# Patient Record
Sex: MALE | Race: BLACK OR AFRICAN AMERICAN | NOT HISPANIC OR LATINO | Employment: OTHER | URBAN - METROPOLITAN AREA
[De-identification: names, ages, dates, MRNs, and addresses within clinical notes are randomized per-mention and may not be internally consistent; named-entity substitution may affect disease eponyms.]

---

## 2024-01-01 ENCOUNTER — HOSPITAL ENCOUNTER (INPATIENT)
Facility: HOSPITAL | Age: 84
LOS: 6 days | End: 2024-11-28
Attending: STUDENT IN AN ORGANIZED HEALTH CARE EDUCATION/TRAINING PROGRAM | Admitting: STUDENT IN AN ORGANIZED HEALTH CARE EDUCATION/TRAINING PROGRAM
Payer: COMMERCIAL

## 2024-01-01 VITALS
SYSTOLIC BLOOD PRESSURE: 90 MMHG | DIASTOLIC BLOOD PRESSURE: 60 MMHG | HEART RATE: 116 BPM | RESPIRATION RATE: 18 BRPM | TEMPERATURE: 97.1 F | OXYGEN SATURATION: 96 %

## 2024-01-01 PROCEDURE — 25010000002 LORAZEPAM PER 2 MG: Performed by: STUDENT IN AN ORGANIZED HEALTH CARE EDUCATION/TRAINING PROGRAM

## 2024-01-01 PROCEDURE — 25010000002 MORPHINE PER 10 MG: Performed by: STUDENT IN AN ORGANIZED HEALTH CARE EDUCATION/TRAINING PROGRAM

## 2024-01-01 PROCEDURE — 25010000002 HYDROMORPHONE 1 MG/ML SOLUTION: Performed by: STUDENT IN AN ORGANIZED HEALTH CARE EDUCATION/TRAINING PROGRAM

## 2024-01-01 PROCEDURE — 25010000002 LORAZEPAM PER 2 MG: Performed by: HOSPITALIST

## 2024-01-01 RX ORDER — MORPHINE SULFATE 10 MG/ML
6 INJECTION INTRAMUSCULAR; INTRAVENOUS; SUBCUTANEOUS
Status: ACTIVE | OUTPATIENT
Start: 2024-01-01 | End: 2024-01-01

## 2024-01-01 RX ORDER — ACETAMINOPHEN 325 MG/1
650 TABLET ORAL EVERY 4 HOURS PRN
Status: DISCONTINUED | OUTPATIENT
Start: 2024-01-01 | End: 2024-01-01 | Stop reason: HOSPADM

## 2024-01-01 RX ORDER — LORAZEPAM 2 MG/ML
0.5 INJECTION INTRAMUSCULAR
Status: ACTIVE | OUTPATIENT
Start: 2024-01-01 | End: 2024-01-01

## 2024-01-01 RX ORDER — LORAZEPAM 2 MG/ML
1 CONCENTRATE ORAL
Status: ACTIVE | OUTPATIENT
Start: 2024-01-01 | End: 2024-01-01

## 2024-01-01 RX ORDER — LORAZEPAM 2 MG/ML
1 INJECTION INTRAMUSCULAR
Status: ACTIVE | OUTPATIENT
Start: 2024-01-01 | End: 2024-01-01

## 2024-01-01 RX ORDER — MORPHINE SULFATE 20 MG/ML
20 SOLUTION ORAL
Status: ACTIVE | OUTPATIENT
Start: 2024-01-01 | End: 2024-01-01

## 2024-01-01 RX ORDER — LORAZEPAM 2 MG/ML
0.5 INJECTION INTRAMUSCULAR
Status: DISCONTINUED | OUTPATIENT
Start: 2024-01-01 | End: 2024-01-01 | Stop reason: HOSPADM

## 2024-01-01 RX ORDER — LORAZEPAM 2 MG/ML
1 INJECTION INTRAMUSCULAR
Status: DISCONTINUED | OUTPATIENT
Start: 2024-01-01 | End: 2024-01-01 | Stop reason: HOSPADM

## 2024-01-01 RX ORDER — LORAZEPAM 2 MG/ML
2 INJECTION INTRAMUSCULAR
Status: DISCONTINUED | OUTPATIENT
Start: 2024-01-01 | End: 2024-01-01 | Stop reason: HOSPADM

## 2024-01-01 RX ORDER — LORAZEPAM 2 MG/ML
2 INJECTION INTRAMUSCULAR
Status: ACTIVE | OUTPATIENT
Start: 2024-01-01 | End: 2024-01-01

## 2024-01-01 RX ORDER — MORPHINE SULFATE 20 MG/ML
5 SOLUTION ORAL
Status: ACTIVE | OUTPATIENT
Start: 2024-01-01 | End: 2024-01-01

## 2024-01-01 RX ORDER — MORPHINE SULFATE 20 MG/ML
10 SOLUTION ORAL
Status: ACTIVE | OUTPATIENT
Start: 2024-01-01 | End: 2024-01-01

## 2024-01-01 RX ORDER — LORAZEPAM 2 MG/ML
2 CONCENTRATE ORAL
Status: ACTIVE | OUTPATIENT
Start: 2024-01-01 | End: 2024-01-01

## 2024-01-01 RX ORDER — LORAZEPAM 2 MG/ML
2 CONCENTRATE ORAL
Status: DISCONTINUED | OUTPATIENT
Start: 2024-01-01 | End: 2024-01-01 | Stop reason: HOSPADM

## 2024-01-01 RX ORDER — LORAZEPAM 2 MG/ML
0.5 CONCENTRATE ORAL
Status: DISCONTINUED | OUTPATIENT
Start: 2024-01-01 | End: 2024-01-01 | Stop reason: HOSPADM

## 2024-01-01 RX ORDER — MORPHINE SULFATE 2 MG/ML
2 INJECTION, SOLUTION INTRAMUSCULAR; INTRAVENOUS
Status: ACTIVE | OUTPATIENT
Start: 2024-01-01 | End: 2024-01-01

## 2024-01-01 RX ORDER — DIPHENOXYLATE HYDROCHLORIDE AND ATROPINE SULFATE 2.5; .025 MG/1; MG/1
1 TABLET ORAL
Status: DISCONTINUED | OUTPATIENT
Start: 2024-01-01 | End: 2024-01-01 | Stop reason: HOSPADM

## 2024-01-01 RX ORDER — HYDROMORPHONE HYDROCHLORIDE 1 MG/ML
0.5 INJECTION, SOLUTION INTRAMUSCULAR; INTRAVENOUS; SUBCUTANEOUS
Status: ACTIVE | OUTPATIENT
Start: 2024-01-01 | End: 2024-01-01

## 2024-01-01 RX ORDER — BISACODYL 5 MG/1
5 TABLET, DELAYED RELEASE ORAL DAILY PRN
Status: DISCONTINUED | OUTPATIENT
Start: 2024-01-01 | End: 2024-01-01 | Stop reason: HOSPADM

## 2024-01-01 RX ORDER — AMOXICILLIN 250 MG
2 CAPSULE ORAL 2 TIMES DAILY PRN
Status: DISCONTINUED | OUTPATIENT
Start: 2024-01-01 | End: 2024-01-01 | Stop reason: HOSPADM

## 2024-01-01 RX ORDER — LORAZEPAM 2 MG/ML
1 CONCENTRATE ORAL
Status: DISCONTINUED | OUTPATIENT
Start: 2024-01-01 | End: 2024-01-01 | Stop reason: HOSPADM

## 2024-01-01 RX ORDER — HYDROMORPHONE HYDROCHLORIDE 2 MG/ML
1.5 INJECTION, SOLUTION INTRAMUSCULAR; INTRAVENOUS; SUBCUTANEOUS
Status: ACTIVE | OUTPATIENT
Start: 2024-01-01 | End: 2024-01-01

## 2024-01-01 RX ORDER — BISACODYL 10 MG
10 SUPPOSITORY, RECTAL RECTAL DAILY PRN
Status: DISCONTINUED | OUTPATIENT
Start: 2024-01-01 | End: 2024-01-01 | Stop reason: HOSPADM

## 2024-01-01 RX ORDER — ONDANSETRON 4 MG/1
4 TABLET, ORALLY DISINTEGRATING ORAL EVERY 6 HOURS PRN
Status: DISCONTINUED | OUTPATIENT
Start: 2024-01-01 | End: 2024-01-01 | Stop reason: HOSPADM

## 2024-01-01 RX ORDER — ONDANSETRON 2 MG/ML
4 INJECTION INTRAMUSCULAR; INTRAVENOUS EVERY 6 HOURS PRN
Status: DISCONTINUED | OUTPATIENT
Start: 2024-01-01 | End: 2024-01-01 | Stop reason: HOSPADM

## 2024-01-01 RX ORDER — LORAZEPAM 2 MG/ML
0.5 CONCENTRATE ORAL
Status: ACTIVE | OUTPATIENT
Start: 2024-01-01 | End: 2024-01-01

## 2024-01-01 RX ORDER — ACETAMINOPHEN 160 MG/5ML
650 SOLUTION ORAL EVERY 4 HOURS PRN
Status: DISCONTINUED | OUTPATIENT
Start: 2024-01-01 | End: 2024-01-01 | Stop reason: HOSPADM

## 2024-01-01 RX ORDER — LORAZEPAM 2 MG/ML
1 INJECTION INTRAMUSCULAR
Status: DISPENSED | OUTPATIENT
Start: 2024-01-01 | End: 2024-01-01

## 2024-01-01 RX ORDER — MORPHINE SULFATE 4 MG/ML
4 INJECTION, SOLUTION INTRAMUSCULAR; INTRAVENOUS
Status: DISPENSED | OUTPATIENT
Start: 2024-01-01 | End: 2024-01-01

## 2024-01-01 RX ORDER — HYDROMORPHONE HYDROCHLORIDE 1 MG/ML
0.25 INJECTION, SOLUTION INTRAMUSCULAR; INTRAVENOUS; SUBCUTANEOUS
Status: DISCONTINUED | OUTPATIENT
Start: 2024-01-01 | End: 2024-01-01 | Stop reason: HOSPADM

## 2024-01-01 RX ORDER — POLYETHYLENE GLYCOL 3350 17 G/17G
17 POWDER, FOR SOLUTION ORAL DAILY PRN
Status: DISCONTINUED | OUTPATIENT
Start: 2024-01-01 | End: 2024-01-01 | Stop reason: HOSPADM

## 2024-01-01 RX ORDER — ACETAMINOPHEN 650 MG/1
650 SUPPOSITORY RECTAL EVERY 4 HOURS PRN
Status: DISCONTINUED | OUTPATIENT
Start: 2024-01-01 | End: 2024-01-01 | Stop reason: HOSPADM

## 2024-01-01 RX ADMIN — HYDROMORPHONE HYDROCHLORIDE 1 MG: 1 INJECTION, SOLUTION INTRAMUSCULAR; INTRAVENOUS; SUBCUTANEOUS at 05:55

## 2024-01-01 RX ADMIN — MORPHINE SULFATE 4 MG: 4 INJECTION, SOLUTION INTRAMUSCULAR; INTRAVENOUS at 20:19

## 2024-01-01 RX ADMIN — HYDROMORPHONE HYDROCHLORIDE 1 MG: 1 INJECTION, SOLUTION INTRAMUSCULAR; INTRAVENOUS; SUBCUTANEOUS at 12:31

## 2024-01-01 RX ADMIN — HYDROMORPHONE HYDROCHLORIDE 1 MG: 1 INJECTION, SOLUTION INTRAMUSCULAR; INTRAVENOUS; SUBCUTANEOUS at 14:18

## 2024-01-01 RX ADMIN — MORPHINE SULFATE 4 MG: 4 INJECTION, SOLUTION INTRAMUSCULAR; INTRAVENOUS at 16:54

## 2024-01-01 RX ADMIN — MORPHINE SULFATE 4 MG: 4 INJECTION, SOLUTION INTRAMUSCULAR; INTRAVENOUS at 13:40

## 2024-01-01 RX ADMIN — HYDROMORPHONE HYDROCHLORIDE 1 MG: 1 INJECTION, SOLUTION INTRAMUSCULAR; INTRAVENOUS; SUBCUTANEOUS at 00:40

## 2024-01-01 RX ADMIN — LORAZEPAM 1 MG: 2 INJECTION INTRAMUSCULAR; INTRAVENOUS at 12:31

## 2024-01-01 RX ADMIN — HYDROMORPHONE HYDROCHLORIDE 1 MG: 1 INJECTION, SOLUTION INTRAMUSCULAR; INTRAVENOUS; SUBCUTANEOUS at 10:15

## 2024-01-01 RX ADMIN — ACETAMINOPHEN 650 MG: 325 TABLET ORAL at 17:44

## 2024-01-01 RX ADMIN — HYDROMORPHONE HYDROCHLORIDE 1 MG: 1 INJECTION, SOLUTION INTRAMUSCULAR; INTRAVENOUS; SUBCUTANEOUS at 07:54

## 2024-01-01 RX ADMIN — MORPHINE SULFATE 4 MG: 4 INJECTION, SOLUTION INTRAMUSCULAR; INTRAVENOUS at 00:40

## 2024-01-01 RX ADMIN — LORAZEPAM 1 MG: 2 INJECTION INTRAMUSCULAR; INTRAVENOUS at 18:24

## 2024-01-01 RX ADMIN — HYDROMORPHONE HYDROCHLORIDE 1 MG: 1 INJECTION, SOLUTION INTRAMUSCULAR; INTRAVENOUS; SUBCUTANEOUS at 17:30

## 2024-01-01 RX ADMIN — HYDROMORPHONE HYDROCHLORIDE 1 MG: 1 INJECTION, SOLUTION INTRAMUSCULAR; INTRAVENOUS; SUBCUTANEOUS at 18:24

## 2024-01-01 RX ADMIN — HYDROMORPHONE HYDROCHLORIDE 1 MG: 1 INJECTION, SOLUTION INTRAMUSCULAR; INTRAVENOUS; SUBCUTANEOUS at 18:52

## 2024-11-07 ENCOUNTER — APPOINTMENT (OUTPATIENT)
Dept: GENERAL RADIOLOGY | Facility: HOSPITAL | Age: 84
End: 2024-11-07
Payer: MEDICARE

## 2024-11-07 ENCOUNTER — APPOINTMENT (OUTPATIENT)
Dept: CT IMAGING | Facility: HOSPITAL | Age: 84
End: 2024-11-07
Payer: MEDICARE

## 2024-11-07 ENCOUNTER — HOSPITAL ENCOUNTER (INPATIENT)
Facility: HOSPITAL | Age: 84
LOS: 15 days | End: 2024-11-22
Attending: STUDENT IN AN ORGANIZED HEALTH CARE EDUCATION/TRAINING PROGRAM | Admitting: HOSPITALIST
Payer: MEDICARE

## 2024-11-07 DIAGNOSIS — N17.9 ACUTE RENAL FAILURE, UNSPECIFIED ACUTE RENAL FAILURE TYPE: ICD-10-CM

## 2024-11-07 DIAGNOSIS — E87.0 HYPERNATREMIA: Primary | ICD-10-CM

## 2024-11-07 DIAGNOSIS — R41.82 ALTERED MENTAL STATUS, UNSPECIFIED ALTERED MENTAL STATUS TYPE: ICD-10-CM

## 2024-11-07 LAB
ALBUMIN SERPL-MCNC: 2.3 G/DL (ref 3.5–5.2)
ALBUMIN/GLOB SERPL: 0.8 G/DL
ALP SERPL-CCNC: 76 U/L (ref 39–117)
ALT SERPL W P-5'-P-CCNC: 11 U/L (ref 1–41)
AMORPH URATE CRY URNS QL MICRO: PRESENT /HPF
ANION GAP SERPL CALCULATED.3IONS-SCNC: 12 MMOL/L (ref 5–15)
ANION GAP SERPL CALCULATED.3IONS-SCNC: 16 MMOL/L (ref 5–15)
AST SERPL-CCNC: 12 U/L (ref 1–40)
BACTERIA UR QL AUTO: ABNORMAL /HPF
BASOPHILS # BLD AUTO: 0 10*3/MM3 (ref 0–0.2)
BASOPHILS # BLD AUTO: 0.01 10*3/MM3 (ref 0–0.2)
BASOPHILS NFR BLD AUTO: 0 % (ref 0–1.5)
BASOPHILS NFR BLD AUTO: 0.1 % (ref 0–1.5)
BILIRUB SERPL-MCNC: 0.3 MG/DL (ref 0–1.2)
BILIRUB UR QL STRIP: NEGATIVE
BUN SERPL-MCNC: 112 MG/DL (ref 8–23)
BUN SERPL-MCNC: 119 MG/DL (ref 8–23)
BUN/CREAT SERPL: 27.2 (ref 7–25)
BUN/CREAT SERPL: 29.7 (ref 7–25)
CALCIUM SPEC-SCNC: 7.6 MG/DL (ref 8.6–10.5)
CALCIUM SPEC-SCNC: 8.2 MG/DL (ref 8.6–10.5)
CHLORIDE SERPL-SCNC: 140 MMOL/L (ref 98–107)
CHLORIDE SERPL-SCNC: 140 MMOL/L (ref 98–107)
CLARITY UR: ABNORMAL
CO2 SERPL-SCNC: 14 MMOL/L (ref 22–29)
CO2 SERPL-SCNC: 16 MMOL/L (ref 22–29)
COLOR UR: ABNORMAL
CREAT SERPL-MCNC: 3.77 MG/DL (ref 0.76–1.27)
CREAT SERPL-MCNC: 4.37 MG/DL (ref 0.76–1.27)
D-LACTATE SERPL-SCNC: 2.3 MMOL/L (ref 0.5–2)
D-LACTATE SERPL-SCNC: 4.2 MMOL/L (ref 0.5–2)
DEPRECATED RDW RBC AUTO: 53.1 FL (ref 37–54)
DEPRECATED RDW RBC AUTO: 53.6 FL (ref 37–54)
EGFRCR SERPLBLD CKD-EPI 2021: 12.8 ML/MIN/1.73
EGFRCR SERPLBLD CKD-EPI 2021: 15.3 ML/MIN/1.73
EOSINOPHIL # BLD AUTO: 0 10*3/MM3 (ref 0–0.4)
EOSINOPHIL # BLD AUTO: 0 10*3/MM3 (ref 0–0.4)
EOSINOPHIL NFR BLD AUTO: 0 % (ref 0.3–6.2)
EOSINOPHIL NFR BLD AUTO: 0 % (ref 0.3–6.2)
ERYTHROCYTE [DISTWIDTH] IN BLOOD BY AUTOMATED COUNT: 15.9 % (ref 12.3–15.4)
ERYTHROCYTE [DISTWIDTH] IN BLOOD BY AUTOMATED COUNT: 16.2 % (ref 12.3–15.4)
GEN 5 2HR TROPONIN T REFLEX: 165 NG/L
GLOBULIN UR ELPH-MCNC: 3 GM/DL
GLUCOSE BLDC GLUCOMTR-MCNC: 137 MG/DL (ref 70–130)
GLUCOSE BLDC GLUCOMTR-MCNC: 223 MG/DL (ref 70–130)
GLUCOSE SERPL-MCNC: 128 MG/DL (ref 65–99)
GLUCOSE SERPL-MCNC: 252 MG/DL (ref 65–99)
GLUCOSE UR STRIP-MCNC: NEGATIVE MG/DL
HCT VFR BLD AUTO: 24 % (ref 37.5–51)
HCT VFR BLD AUTO: 26.2 % (ref 37.5–51)
HGB BLD-MCNC: 7.9 G/DL (ref 13–17.7)
HGB BLD-MCNC: 8.5 G/DL (ref 13–17.7)
HGB UR QL STRIP.AUTO: ABNORMAL
HYALINE CASTS UR QL AUTO: ABNORMAL /LPF
IMM GRANULOCYTES # BLD AUTO: 0.06 10*3/MM3 (ref 0–0.05)
IMM GRANULOCYTES # BLD AUTO: 0.07 10*3/MM3 (ref 0–0.05)
IMM GRANULOCYTES NFR BLD AUTO: 0.7 % (ref 0–0.5)
IMM GRANULOCYTES NFR BLD AUTO: 0.7 % (ref 0–0.5)
KETONES UR QL STRIP: NEGATIVE
LEUKOCYTE ESTERASE UR QL STRIP.AUTO: ABNORMAL
LYMPHOCYTES # BLD AUTO: 1.27 10*3/MM3 (ref 0.7–3.1)
LYMPHOCYTES # BLD AUTO: 1.53 10*3/MM3 (ref 0.7–3.1)
LYMPHOCYTES NFR BLD AUTO: 14.3 % (ref 19.6–45.3)
LYMPHOCYTES NFR BLD AUTO: 15 % (ref 19.6–45.3)
MCH RBC QN AUTO: 30.1 PG (ref 26.6–33)
MCH RBC QN AUTO: 30.2 PG (ref 26.6–33)
MCHC RBC AUTO-ENTMCNC: 32.4 G/DL (ref 31.5–35.7)
MCHC RBC AUTO-ENTMCNC: 32.9 G/DL (ref 31.5–35.7)
MCV RBC AUTO: 91.6 FL (ref 79–97)
MCV RBC AUTO: 92.9 FL (ref 79–97)
MONOCYTES # BLD AUTO: 0.39 10*3/MM3 (ref 0.1–0.9)
MONOCYTES # BLD AUTO: 0.48 10*3/MM3 (ref 0.1–0.9)
MONOCYTES NFR BLD AUTO: 4.4 % (ref 5–12)
MONOCYTES NFR BLD AUTO: 4.7 % (ref 5–12)
NEUTROPHILS NFR BLD AUTO: 7.14 10*3/MM3 (ref 1.7–7)
NEUTROPHILS NFR BLD AUTO: 79.6 % (ref 42.7–76)
NEUTROPHILS NFR BLD AUTO: 8.15 10*3/MM3 (ref 1.7–7)
NEUTROPHILS NFR BLD AUTO: 80.5 % (ref 42.7–76)
NITRITE UR QL STRIP: NEGATIVE
NRBC BLD AUTO-RTO: 0.2 /100 WBC (ref 0–0.2)
PH UR STRIP.AUTO: 8 [PH] (ref 5–8)
PLATELET # BLD AUTO: 77 10*3/MM3 (ref 140–450)
PLATELET # BLD AUTO: 83 10*3/MM3 (ref 140–450)
PMV BLD AUTO: 10.8 FL (ref 6–12)
PMV BLD AUTO: 11.6 FL (ref 6–12)
POTASSIUM SERPL-SCNC: 5 MMOL/L (ref 3.5–5.2)
POTASSIUM SERPL-SCNC: 5 MMOL/L (ref 3.5–5.2)
PROT SERPL-MCNC: 5.3 G/DL (ref 6–8.5)
PROT UR QL STRIP: ABNORMAL
RBC # BLD AUTO: 2.62 10*6/MM3 (ref 4.14–5.8)
RBC # BLD AUTO: 2.82 10*6/MM3 (ref 4.14–5.8)
RBC # UR STRIP: ABNORMAL /HPF
REF LAB TEST METHOD: ABNORMAL
SODIUM SERPL-SCNC: 166 MMOL/L (ref 136–145)
SODIUM SERPL-SCNC: 172 MMOL/L (ref 136–145)
SP GR UR STRIP: 1.02 (ref 1–1.03)
SQUAMOUS #/AREA URNS HPF: ABNORMAL /HPF
TROPONIN T DELTA: -7 NG/L
TROPONIN T SERPL HS-MCNC: 172 NG/L
UROBILINOGEN UR QL STRIP: ABNORMAL
WBC # UR STRIP: ABNORMAL /HPF
WBC NRBC COR # BLD AUTO: 10.23 10*3/MM3 (ref 3.4–10.8)
WBC NRBC COR # BLD AUTO: 8.87 10*3/MM3 (ref 3.4–10.8)

## 2024-11-07 PROCEDURE — 25810000003 LACTATED RINGERS PER 1000 ML: Performed by: STUDENT IN AN ORGANIZED HEALTH CARE EDUCATION/TRAINING PROGRAM

## 2024-11-07 PROCEDURE — 93010 ELECTROCARDIOGRAM REPORT: CPT | Performed by: INTERNAL MEDICINE

## 2024-11-07 PROCEDURE — 87040 BLOOD CULTURE FOR BACTERIA: CPT | Performed by: STUDENT IN AN ORGANIZED HEALTH CARE EDUCATION/TRAINING PROGRAM

## 2024-11-07 PROCEDURE — 99291 CRITICAL CARE FIRST HOUR: CPT

## 2024-11-07 PROCEDURE — 71045 X-RAY EXAM CHEST 1 VIEW: CPT

## 2024-11-07 PROCEDURE — 36415 COLL VENOUS BLD VENIPUNCTURE: CPT

## 2024-11-07 PROCEDURE — 82948 REAGENT STRIP/BLOOD GLUCOSE: CPT

## 2024-11-07 PROCEDURE — 87086 URINE CULTURE/COLONY COUNT: CPT | Performed by: STUDENT IN AN ORGANIZED HEALTH CARE EDUCATION/TRAINING PROGRAM

## 2024-11-07 PROCEDURE — 25010000002 VANCOMYCIN 1 G RECONSTITUTED SOLUTION 1 EACH VIAL: Performed by: STUDENT IN AN ORGANIZED HEALTH CARE EDUCATION/TRAINING PROGRAM

## 2024-11-07 PROCEDURE — 25810000003 SODIUM CHLORIDE 0.9 % SOLUTION 250 ML FLEX CONT: Performed by: STUDENT IN AN ORGANIZED HEALTH CARE EDUCATION/TRAINING PROGRAM

## 2024-11-07 PROCEDURE — 51702 INSERT TEMP BLADDER CATH: CPT

## 2024-11-07 PROCEDURE — 93005 ELECTROCARDIOGRAM TRACING: CPT | Performed by: STUDENT IN AN ORGANIZED HEALTH CARE EDUCATION/TRAINING PROGRAM

## 2024-11-07 PROCEDURE — 87077 CULTURE AEROBIC IDENTIFY: CPT | Performed by: STUDENT IN AN ORGANIZED HEALTH CARE EDUCATION/TRAINING PROGRAM

## 2024-11-07 PROCEDURE — 87186 SC STD MICRODIL/AGAR DIL: CPT | Performed by: STUDENT IN AN ORGANIZED HEALTH CARE EDUCATION/TRAINING PROGRAM

## 2024-11-07 PROCEDURE — 84484 ASSAY OF TROPONIN QUANT: CPT | Performed by: STUDENT IN AN ORGANIZED HEALTH CARE EDUCATION/TRAINING PROGRAM

## 2024-11-07 PROCEDURE — 80053 COMPREHEN METABOLIC PANEL: CPT | Performed by: HOSPITALIST

## 2024-11-07 PROCEDURE — 25010000002 CEFEPIME PER 500 MG: Performed by: STUDENT IN AN ORGANIZED HEALTH CARE EDUCATION/TRAINING PROGRAM

## 2024-11-07 PROCEDURE — 85025 COMPLETE CBC W/AUTO DIFF WBC: CPT | Performed by: HOSPITALIST

## 2024-11-07 PROCEDURE — 83605 ASSAY OF LACTIC ACID: CPT | Performed by: STUDENT IN AN ORGANIZED HEALTH CARE EDUCATION/TRAINING PROGRAM

## 2024-11-07 PROCEDURE — 85025 COMPLETE CBC W/AUTO DIFF WBC: CPT | Performed by: STUDENT IN AN ORGANIZED HEALTH CARE EDUCATION/TRAINING PROGRAM

## 2024-11-07 PROCEDURE — 70450 CT HEAD/BRAIN W/O DYE: CPT

## 2024-11-07 PROCEDURE — 81001 URINALYSIS AUTO W/SCOPE: CPT | Performed by: STUDENT IN AN ORGANIZED HEALTH CARE EDUCATION/TRAINING PROGRAM

## 2024-11-07 RX ORDER — ALUMINA, MAGNESIA, AND SIMETHICONE 2400; 2400; 240 MG/30ML; MG/30ML; MG/30ML
15 SUSPENSION ORAL EVERY 6 HOURS PRN
Status: DISCONTINUED | OUTPATIENT
Start: 2024-11-07 | End: 2024-11-17

## 2024-11-07 RX ORDER — ACETAMINOPHEN 325 MG/1
650 TABLET ORAL EVERY 4 HOURS PRN
Status: DISCONTINUED | OUTPATIENT
Start: 2024-11-07 | End: 2024-11-17

## 2024-11-07 RX ORDER — BISACODYL 5 MG/1
5 TABLET, DELAYED RELEASE ORAL DAILY PRN
Status: DISCONTINUED | OUTPATIENT
Start: 2024-11-07 | End: 2024-11-11

## 2024-11-07 RX ORDER — SODIUM CHLORIDE 450 MG/100ML
75 INJECTION, SOLUTION INTRAVENOUS CONTINUOUS
Status: DISCONTINUED | OUTPATIENT
Start: 2024-11-07 | End: 2024-11-08

## 2024-11-07 RX ORDER — SODIUM CHLORIDE, SODIUM LACTATE, POTASSIUM CHLORIDE, CALCIUM CHLORIDE 600; 310; 30; 20 MG/100ML; MG/100ML; MG/100ML; MG/100ML
150 INJECTION, SOLUTION INTRAVENOUS CONTINUOUS
Status: ACTIVE | OUTPATIENT
Start: 2024-11-07 | End: 2024-11-07

## 2024-11-07 RX ORDER — ONDANSETRON 4 MG/1
4 TABLET, ORALLY DISINTEGRATING ORAL EVERY 6 HOURS PRN
Status: DISCONTINUED | OUTPATIENT
Start: 2024-11-07 | End: 2024-11-17

## 2024-11-07 RX ORDER — AMOXICILLIN 250 MG
2 CAPSULE ORAL 2 TIMES DAILY
Status: DISCONTINUED | OUTPATIENT
Start: 2024-11-08 | End: 2024-11-11

## 2024-11-07 RX ORDER — NOREPINEPHRINE BITARTRATE 0.03 MG/ML
.02-.3 INJECTION, SOLUTION INTRAVENOUS
Status: DISCONTINUED | OUTPATIENT
Start: 2024-11-08 | End: 2024-11-10

## 2024-11-07 RX ORDER — BISACODYL 10 MG
10 SUPPOSITORY, RECTAL RECTAL DAILY PRN
Status: DISCONTINUED | OUTPATIENT
Start: 2024-11-07 | End: 2024-11-11

## 2024-11-07 RX ORDER — ACETAMINOPHEN 650 MG/1
650 SUPPOSITORY RECTAL EVERY 4 HOURS PRN
Status: DISCONTINUED | OUTPATIENT
Start: 2024-11-07 | End: 2024-11-17

## 2024-11-07 RX ORDER — POLYETHYLENE GLYCOL 3350 17 G/17G
17 POWDER, FOR SOLUTION ORAL DAILY PRN
Status: DISCONTINUED | OUTPATIENT
Start: 2024-11-07 | End: 2024-11-11

## 2024-11-07 RX ORDER — NITROGLYCERIN 0.4 MG/1
0.4 TABLET SUBLINGUAL
Status: DISCONTINUED | OUTPATIENT
Start: 2024-11-07 | End: 2024-11-11

## 2024-11-07 RX ORDER — ONDANSETRON 2 MG/ML
4 INJECTION INTRAMUSCULAR; INTRAVENOUS EVERY 6 HOURS PRN
Status: DISCONTINUED | OUTPATIENT
Start: 2024-11-07 | End: 2024-11-17

## 2024-11-07 RX ADMIN — NOREPINEPHRINE BITARTRATE 0.02 MCG/KG/MIN: 32 SOLUTION INTRAVENOUS at 23:18

## 2024-11-07 RX ADMIN — VANCOMYCIN HYDROCHLORIDE 1000 MG: 1 INJECTION, POWDER, LYOPHILIZED, FOR SOLUTION INTRAVENOUS at 21:25

## 2024-11-07 RX ADMIN — CEFEPIME 2000 MG: 2 INJECTION, POWDER, FOR SOLUTION INTRAVENOUS at 20:50

## 2024-11-07 RX ADMIN — SODIUM CHLORIDE, POTASSIUM CHLORIDE, SODIUM LACTATE AND CALCIUM CHLORIDE 150 ML/HR: 600; 310; 30; 20 INJECTION, SOLUTION INTRAVENOUS at 18:46

## 2024-11-07 RX ADMIN — SODIUM CHLORIDE 150 ML/HR: 4.5 INJECTION, SOLUTION INTRAVENOUS at 21:56

## 2024-11-07 RX ADMIN — MUPIROCIN 1 APPLICATION: 20 OINTMENT TOPICAL at 23:19

## 2024-11-07 NOTE — ED PROVIDER NOTES
EMERGENCY DEPARTMENT ENCOUNTER  Room Number:  30/30  PCP: Koby Holbrook MD  Independent Historians: Family and EMS      HPI:  Chief Complaint: had concerns including Altered Mental Status.     A complete HPI/ROS/PMH/PSH/SH/FH are unobtainable due to: Altered Mental Status and Dementia      Context: Sabas Jefferson is a 84 y.o. male with a medical history of HTN, CKD, dementia who presents to the ED c/o acute alteration in mental status.  History primarily provided by EMS who states they were dispatched for shortness of breath.  When they arrived patient was found to be hypotensive with altered mental status.  They report that family told them last known well was last Saturday.  Apparently patient is nonverbal at baseline and has severe dementia.  Patient noted to be cachectic appearing and minimally interactive in the interview.      Review of prior external notes (non-ED) -and- Review of prior external test results outside of this encounter: Discharge summary from 10/18/2023 reviewed and notable for history of dementia, DVT, prostate cancer who had been admitted for UTI.  Patient received antibiotics and ultimately was discharged home with home health.    PAST MEDICAL HISTORY  Active Ambulatory Problems     Diagnosis Date Noted    Osteoarthritis of left hip 06/21/2016    Hypertension 06/21/2016    Chronic kidney disease, stage II (mild) 06/22/2016     Resolved Ambulatory Problems     Diagnosis Date Noted    No Resolved Ambulatory Problems     Past Medical History:   Diagnosis Date    Arthritis     Cancer     History of kidney stones          PAST SURGICAL HISTORY  Past Surgical History:   Procedure Laterality Date    GASTRIC BYPASS      1960    HERNIA REPAIR Right     used mesh    STOMACH SURGERY      had gastric bypass reversed 2015    TOTAL HIP ARTHROPLASTY Left 6/21/2016    Procedure: LT POSTERIOR TOTAL HIP ARTHROPLASTY;  Surgeon: Emil Peña MD;  Location: [facility] OR;  Service:           FAMILY HISTORY  No family history on file.      SOCIAL HISTORY  Social History     Socioeconomic History    Marital status:    Tobacco Use    Smoking status: Former     Current packs/day: 1.00     Average packs/day: 1 pack/day for 10.0 years (10.0 ttl pk-yrs)     Types: Cigarettes    Smokeless tobacco: Never   Substance and Sexual Activity    Alcohol use: Yes     Alcohol/week: 7.0 standard drinks of alcohol     Types: 7 Glasses of wine per week    Drug use: No         ALLERGIES  Patient has no known allergies.      REVIEW OF SYSTEMS  Review of Systems  Included in HPI  All systems reviewed and negative except for those discussed in HPI.      PHYSICAL EXAM    I have reviewed the triage vital signs and nursing notes.    ED Triage Vitals [11/07/24 1801]   Temp Heart Rate Resp BP SpO2   -- 95 27 101/62 98 %      Temp src Heart Rate Source Patient Position BP Location FiO2 (%)   -- -- -- -- --       Physical Exam  GENERAL: alert, no interaction on exam though does track with eyes, cachectic appearing  SKIN: Warm, dry  HENT: Normocephalic, atraumatic  EYES: no scleral icterus.  Pupils 2 mm and equal  CV: regular rhythm, regular rate  RESPIRATORY: normal effort, lungs clear  ABDOMEN: soft, nontender, nondistended  MUSCULOSKELETAL: no deformity  NEURO: alert, moves all extremities though appears limited on the left, does not really follow any commands but is looking around the room and tracks with his eyes.            LAB RESULTS  Recent Results (from the past 24 hours)   POC Glucose Once    Collection Time: 11/07/24  6:04 PM    Specimen: Blood   Result Value Ref Range    Glucose 223 (H) 70 - 130 mg/dL   Comprehensive Metabolic Panel    Collection Time: 11/07/24  6:33 PM    Specimen: Blood   Result Value Ref Range    Glucose 252 (H) 65 - 99 mg/dL     (H) 8 - 23 mg/dL    Creatinine 4.37 (H) 0.76 - 1.27 mg/dL    Sodium 172 (C) 136 - 145 mmol/L    Potassium 5.0 3.5 - 5.2 mmol/L    Chloride 140 (H) 98 -  107 mmol/L    CO2 16.0 (L) 22.0 - 29.0 mmol/L    Calcium 8.2 (L) 8.6 - 10.5 mg/dL    Total Protein 5.3 (L) 6.0 - 8.5 g/dL    Albumin 2.3 (L) 3.5 - 5.2 g/dL    ALT (SGPT) 11 1 - 41 U/L    AST (SGOT) 12 1 - 40 U/L    Alkaline Phosphatase 76 39 - 117 U/L    Total Bilirubin 0.3 0.0 - 1.2 mg/dL    Globulin 3.0 gm/dL    A/G Ratio 0.8 g/dL    BUN/Creatinine Ratio 27.2 (H) 7.0 - 25.0    Anion Gap 16.0 (H) 5.0 - 15.0 mmol/L    eGFR 12.8 (L) >60.0 mL/min/1.73   Single High Sensitivity Troponin T    Collection Time: 11/07/24  6:33 PM    Specimen: Blood   Result Value Ref Range    HS Troponin T 172 (C) <22 ng/L   Lactic Acid, Plasma    Collection Time: 11/07/24  6:33 PM    Specimen: Blood   Result Value Ref Range    Lactate 4.2 (C) 0.5 - 2.0 mmol/L   CBC Auto Differential    Collection Time: 11/07/24  6:33 PM    Specimen: Blood   Result Value Ref Range    WBC 10.23 3.40 - 10.80 10*3/mm3    RBC 2.82 (L) 4.14 - 5.80 10*6/mm3    Hemoglobin 8.5 (L) 13.0 - 17.7 g/dL    Hematocrit 26.2 (L) 37.5 - 51.0 %    MCV 92.9 79.0 - 97.0 fL    MCH 30.1 26.6 - 33.0 pg    MCHC 32.4 31.5 - 35.7 g/dL    RDW 15.9 (H) 12.3 - 15.4 %    RDW-SD 53.1 37.0 - 54.0 fl    MPV 11.6 6.0 - 12.0 fL    Platelets 83 (L) 140 - 450 10*3/mm3    Neutrophil % 79.6 (H) 42.7 - 76.0 %    Lymphocyte % 15.0 (L) 19.6 - 45.3 %    Monocyte % 4.7 (L) 5.0 - 12.0 %    Eosinophil % 0.0 (L) 0.3 - 6.2 %    Basophil % 0.0 0.0 - 1.5 %    Immature Grans % 0.7 (H) 0.0 - 0.5 %    Neutrophils, Absolute 8.15 (H) 1.70 - 7.00 10*3/mm3    Lymphocytes, Absolute 1.53 0.70 - 3.10 10*3/mm3    Monocytes, Absolute 0.48 0.10 - 0.90 10*3/mm3    Eosinophils, Absolute 0.00 0.00 - 0.40 10*3/mm3    Basophils, Absolute 0.00 0.00 - 0.20 10*3/mm3    Immature Grans, Absolute 0.07 (H) 0.00 - 0.05 10*3/mm3    nRBC 0.2 0.0 - 0.2 /100 WBC   ECG 12 Lead Altered Mental Status    Collection Time: 11/07/24  7:05 PM   Result Value Ref Range    QT Interval 456 ms    QTC Interval 533 ms         RADIOLOGY  CT Head  Without Contrast    Result Date: 11/7/2024  CT HEAD WITHOUT CONTRAST  HISTORY: Altered mental status  TECHNIQUE:  Head CT includes axial imaging from the base of skull to the vertex without intravenous contrast. Radiation dose reduction techniques were utilized, including automated exposure control and exposure modulation based on body size.  COMPARISON: None  FINDINGS: There is diffuse cerebral and cerebellar atrophy associated with enlargement of the sulci, prominent extra-axial spaces, ventriculomegaly, enlargement of the sylvian fissures. There is a chronic infarction involving the anterior superior right frontal lobe where there is cortical thinning and encephalomalacia. There are no abnormal areas of increased attenuation intra-axially to suggest hemorrhage. No extra-axial fluid collection is observed. Prominent anterior bifrontal extra-axial spaces are present due to atrophy and potential small chronic subdural hygromas. No mass effect or shift of the midline structures. Intracranial atherosclerotic calcifications are present.      Diffuse cerebral and cerebellar atrophy associated with ventricular enlargement. Prominent anterior bifrontal extra-axial spaces due to atrophy or potential small chronic subdural hygromas. 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 No evidence for acute intracranial abnormality. There are no previous studies for comparison.      XR Chest 1 View    Result Date: 11/7/2024  CHEST SINGLE VIEW  HISTORY: 84 years of age, Male.  AMS  COMPARISON: Two-view chest 06/16/2016  FINDINGS: Chronic elevation of left diaphragm. There is widening of the mediastinum that appears to be related to tortuosity and suspected enlargement of the thoracic aorta. Patient is also rotated to the right. Lungs appear clear of focal airspace disease and there is no evidence for pulmonary edema or pleural effusion. Cardiac monitoring leads are present.  Mild to moderate  gas distention of bowel loops in the upper abdomen. Bilateral ureteral stents are present. There is a 12 x 10 mm right renal stone.      1. Widening of the superior mediastinum appears to be related to tortuosity and suspected enlargement of the thoracic aorta. This is without definite change allowing for patient rotation to the right though evaluation for thoracic aortic enlargement could be performed with CT. Lungs appear clear. There is chronic elevation of the left hemidiaphragm. 2. Bowel loops in the upper abdomen. 3. Bilateral ureteral stents. 12 mm right renal stone.  This report was finalized on 11/7/2024 8:06 PM by Jhonny Crowder M.D on Workstation: [id number]         MEDICATIONS GIVEN IN ER  Medications   lactated ringers infusion (150 mL/hr Intravenous New Bag 11/7/24 1846)   vancomycin (VANCOCIN) 1,000 mg in sodium chloride 0.9 % 250 mL IVPB-VTB (has no administration in time range)   cefepime 2000 mg IVPB in 100 mL NS (MBP) (2,000 mg Intravenous New Bag 11/7/24 2050)   sodium chloride 0.45 % infusion (has no administration in time range)         ORDERS PLACED DURING THIS VISIT:  Orders Placed This Encounter   Procedures    Blood Culture - Blood,    Blood Culture - Blood,    XR Chest 1 View    CT Head Without Contrast    Comprehensive Metabolic Panel    Urinalysis With Culture If Indicated - Urine, Catheter    Single High Sensitivity Troponin T    Lactic Acid, Plasma    CBC Auto Differential    High Sensitivity Troponin T 2Hr    STAT Lactic Acid, Reflex    Urinalysis, Microscopic Only - Urine, Clean Catch    Insert Indwelling Urinary Catheter    Assess Need for Indwelling Urinary Catheter - Follow Removal Protocol    Urinary Catheter Care    Pulmonology (on-call MD unless specified)    Nephrology (on -call MD unless specified)    POC Glucose Once    ECG 12 Lead Altered Mental Status    Inpatient Admission    CBC & Differential         OUTPATIENT MEDICATION MANAGEMENT:  Current Facility-Administered  Medications Ordered in Epic   Medication Dose Route Frequency Provider Last Rate Last Admin    cefepime 2000 mg IVPB in 100 mL NS (MBP)  2,000 mg Intravenous Once Osvaldo Griffiths MD   2,000 mg at 11/07/24 2050    sodium chloride 0.45 % infusion  150 mL/hr Intravenous Continuous Osvaldo Griffiths MD        vancomycin (VANCOCIN) 1,000 mg in sodium chloride 0.9 % 250 mL IVPB-VTB  20 mg/kg Intravenous Once Osvaldo Griffiths MD         Current Outpatient Medications Ordered in Epic   Medication Sig Dispense Refill    leuprolide acetate, 6 months, (LUPRON) 45 MG kit kit Inject  into the shoulder, thigh, or buttocks.      Multiple Vitamins-Minerals (MULTIVITAMIN ADULT PO) Take 1 tablet by mouth daily.      vitamin B-12 (CYANOCOBALAMIN) 100 MCG tablet Take 50 mcg by mouth.           PROCEDURES  Procedures      Critical care provider statement:    Critical care time (minutes): 45.   Critical care time was exclusive of:  Separately billable procedures and treating other patients   Critical care was necessary to treat or prevent imminent or life-threatening deterioration of the following conditions:  Metabolic Failure and Sepsis   Critical care was time spent personally by me on the following activities:  Development of treatment plan with patient or surrogate, discussions with consultants, evaluation of patient's response to treatment, examination of patient, obtaining history from patient or surrogate, ordering and performing treatments and interventions, ordering and review of laboratory studies, ordering and review of radiographic studies, pulse oximetry, re-evaluation of patient's condition and review of old charts. Critical Care indicators: Hypernatremia with mental status change, Sepsis / septicemia, Shock, unresponsive patient, and Unstable Vital signs       PROGRESS, DATA ANALYSIS, CONSULTS, AND MEDICAL DECISION MAKING  All labs have been independently interpreted by me.  All radiology studies have been reviewed  by me. All EKG's have been independently viewed and interpreted by me.  Discussion below represents my analysis of pertinent findings related to patient's condition, differential diagnosis, treatment plan and final disposition.    Differential diagnosis includes but is not limited to seizure, encephalopathy, sepsis, CVA.    Clinical Scores:                   ED Course as of 11/07/24 2118   u Nov 07, 2024 1914 EKG interpreted by me and demonstrates atrial fibrillation, rate of 82, no QT prolongation, no ST elevation [MW]   1915 Lactate(!!): 4.2 [MW]   1915 HS Troponin T(!!): 172 [MW]   1920 Patient meets criteria for septic shock with lactic of 4.2, intermittent hypotension as well as hypothermia.  Patient received 2 L IV fluid, complete his sepsis bolus.  If patient remains hypotensive we will administer pressors [MW]   1936 Additional laboratory evaluation demonstrates a sodium of 172, acute kidney failure with creatinine of 4.37 and a BUN of 119.  At this point I think it is unclear whether patient is experiencing septic shock leading to hypoperfusion and subsequent kidney failure/electrolyte derangements or if patient is severely dehydrated and suffering from hypovolemic shock.  Patient has received 2 L IV fluid.  Will initiate broad-spectrum antibiotics and admit to the ICU for further evaluation and management. [MW]   1937 Chest x-ray interpreted by me and demonstrates no evidence of consolidation [MW]   2116 Discussed with Dr. Swain of nephrology who recommends initiating 1/2 NS at 150 an hour [MW]      ED Course User Index  [MW] Osvaldo Griffiths MD             AS OF 21:18 EST VITALS:    BP - 93/57  HR - 92  TEMP - 93.2 °F (34 °C) (Rectal)  O2 SATS - 100%    COMPLEXITY OF CARE  The patient requires admission.      DIAGNOSIS  Final diagnoses:   Hypernatremia   Altered mental status, unspecified altered mental status type   Acute renal failure, unspecified acute renal failure type          DISPOSITION  ED Disposition       ED Disposition   Decision to Admit    Condition   --    Comment   Level of Care: Critical Care [6]   Diagnosis: Hypernatremia [199144]   Admitting Physician: ZELDA TORRES [[id number]]   Attending Physician: ZELDA TORRES [[id number]]   Certification: I Certify That Inpatient Hospital Services Are Medically Necessary For Greater Than 2 Midnights                  Please note that portions of this document were completed with a voice recognition program.    Note Disclaimer: At [facility], we believe that sharing information builds trust and better relationships. You are receiving this note because you recently visited [facility]. It is possible you will see health information before a provider has talked with you about it. This kind of information can be easy to misunderstand. To help you fully understand what it means for your health, we urge you to discuss this note with your provider.         Osvaldo Griffiths MD  11/07/24 9438

## 2024-11-07 NOTE — ED TRIAGE NOTES
Pt to er from home via EMS due to SOB and AMS. Pt family stated pt hasn't wanted to eat the past two days and hasn't acted himself. Pt is bedridden.

## 2024-11-08 ENCOUNTER — APPOINTMENT (OUTPATIENT)
Dept: GENERAL RADIOLOGY | Facility: HOSPITAL | Age: 84
End: 2024-11-08
Payer: MEDICARE

## 2024-11-08 ENCOUNTER — APPOINTMENT (OUTPATIENT)
Dept: CT IMAGING | Facility: HOSPITAL | Age: 84
End: 2024-11-08
Payer: MEDICARE

## 2024-11-08 LAB
ANION GAP SERPL CALCULATED.3IONS-SCNC: 12 MMOL/L (ref 5–15)
ANION GAP SERPL CALCULATED.3IONS-SCNC: 13 MMOL/L (ref 5–15)
ANION GAP SERPL CALCULATED.3IONS-SCNC: 6.1 MMOL/L (ref 5–15)
BUN SERPL-MCNC: 105 MG/DL (ref 8–23)
BUN SERPL-MCNC: 108 MG/DL (ref 8–23)
BUN SERPL-MCNC: 109 MG/DL (ref 8–23)
BUN/CREAT SERPL: 27.7 (ref 7–25)
BUN/CREAT SERPL: 30.2 (ref 7–25)
BUN/CREAT SERPL: 32.8 (ref 7–25)
CALCIUM SPEC-SCNC: 7.4 MG/DL (ref 8.6–10.5)
CALCIUM SPEC-SCNC: 7.5 MG/DL (ref 8.6–10.5)
CALCIUM SPEC-SCNC: 7.8 MG/DL (ref 8.6–10.5)
CHLORIDE SERPL-SCNC: 133 MMOL/L (ref 98–107)
CHLORIDE SERPL-SCNC: 137 MMOL/L (ref 98–107)
CHLORIDE SERPL-SCNC: 140 MMOL/L (ref 98–107)
CO2 SERPL-SCNC: 14 MMOL/L (ref 22–29)
CO2 SERPL-SCNC: 17.9 MMOL/L (ref 22–29)
CO2 SERPL-SCNC: 19 MMOL/L (ref 22–29)
CREAT SERPL-MCNC: 3.32 MG/DL (ref 0.76–1.27)
CREAT SERPL-MCNC: 3.58 MG/DL (ref 0.76–1.27)
CREAT SERPL-MCNC: 3.79 MG/DL (ref 0.76–1.27)
D-LACTATE SERPL-SCNC: 1.4 MMOL/L (ref 0.5–2)
DEPRECATED RDW RBC AUTO: 53.8 FL (ref 37–54)
EGFRCR SERPLBLD CKD-EPI 2021: 15.2 ML/MIN/1.73
EGFRCR SERPLBLD CKD-EPI 2021: 16.3 ML/MIN/1.73
EGFRCR SERPLBLD CKD-EPI 2021: 17.8 ML/MIN/1.73
ERYTHROCYTE [DISTWIDTH] IN BLOOD BY AUTOMATED COUNT: 16.3 % (ref 12.3–15.4)
GLUCOSE BLDC GLUCOMTR-MCNC: 177 MG/DL (ref 70–130)
GLUCOSE BLDC GLUCOMTR-MCNC: 184 MG/DL (ref 70–130)
GLUCOSE SERPL-MCNC: 140 MG/DL (ref 65–99)
GLUCOSE SERPL-MCNC: 186 MG/DL (ref 65–99)
GLUCOSE SERPL-MCNC: 91 MG/DL (ref 65–99)
HCT VFR BLD AUTO: 25.3 % (ref 37.5–51)
HGB BLD-MCNC: 8.4 G/DL (ref 13–17.7)
MAGNESIUM SERPL-MCNC: 2.9 MG/DL (ref 1.6–2.4)
MCH RBC QN AUTO: 30.7 PG (ref 26.6–33)
MCHC RBC AUTO-ENTMCNC: 33.2 G/DL (ref 31.5–35.7)
MCV RBC AUTO: 92.3 FL (ref 79–97)
MRSA DNA SPEC QL NAA+PROBE: NORMAL
PHOSPHATE SERPL-MCNC: 3.5 MG/DL (ref 2.5–4.5)
PLATELET # BLD AUTO: 81 10*3/MM3 (ref 140–450)
PMV BLD AUTO: 12.4 FL (ref 6–12)
POTASSIUM SERPL-SCNC: 4.2 MMOL/L (ref 3.5–5.2)
POTASSIUM SERPL-SCNC: 4.2 MMOL/L (ref 3.5–5.2)
POTASSIUM SERPL-SCNC: 4.6 MMOL/L (ref 3.5–5.2)
QT INTERVAL: 456 MS
QTC INTERVAL: 533 MS
RBC # BLD AUTO: 2.74 10*6/MM3 (ref 4.14–5.8)
SODIUM SERPL-SCNC: 161 MMOL/L (ref 136–145)
SODIUM SERPL-SCNC: 165 MMOL/L (ref 136–145)
SODIUM SERPL-SCNC: 166 MMOL/L (ref 136–145)
VANCOMYCIN SERPL-MCNC: 9.5 MCG/ML (ref 5–40)
WBC NRBC COR # BLD AUTO: 11.35 10*3/MM3 (ref 3.4–10.8)

## 2024-11-08 PROCEDURE — 25010000002 MORPHINE PER 10 MG: Performed by: INTERNAL MEDICINE

## 2024-11-08 PROCEDURE — 25010000002 HEPARIN (PORCINE) PER 1000 UNITS: Performed by: INTERNAL MEDICINE

## 2024-11-08 PROCEDURE — 80202 ASSAY OF VANCOMYCIN: CPT | Performed by: HOSPITALIST

## 2024-11-08 PROCEDURE — 74018 RADEX ABDOMEN 1 VIEW: CPT

## 2024-11-08 PROCEDURE — 87641 MR-STAPH DNA AMP PROBE: CPT | Performed by: HOSPITALIST

## 2024-11-08 PROCEDURE — 0 DEXTROSE 5 % SOLUTION: Performed by: INTERNAL MEDICINE

## 2024-11-08 PROCEDURE — 99221 1ST HOSP IP/OBS SF/LOW 40: CPT | Performed by: SURGERY

## 2024-11-08 PROCEDURE — 25810000003 SODIUM CHLORIDE 0.9 % SOLUTION 250 ML FLEX CONT: Performed by: HOSPITALIST

## 2024-11-08 PROCEDURE — 80048 BASIC METABOLIC PNL TOTAL CA: CPT | Performed by: HOSPITALIST

## 2024-11-08 PROCEDURE — 82948 REAGENT STRIP/BLOOD GLUCOSE: CPT

## 2024-11-08 PROCEDURE — 25010000002 CEFEPIME PER 500 MG: Performed by: HOSPITALIST

## 2024-11-08 PROCEDURE — 25010000002 VASOPRESSIN 20 UNIT/ML SOLUTION: Performed by: INTERNAL MEDICINE

## 2024-11-08 PROCEDURE — 83605 ASSAY OF LACTIC ACID: CPT | Performed by: STUDENT IN AN ORGANIZED HEALTH CARE EDUCATION/TRAINING PROGRAM

## 2024-11-08 PROCEDURE — 0 DEXTROSE 5 % SOLUTION 1,000 ML FLEX CONT: Performed by: INTERNAL MEDICINE

## 2024-11-08 PROCEDURE — 84100 ASSAY OF PHOSPHORUS: CPT | Performed by: HOSPITALIST

## 2024-11-08 PROCEDURE — 74176 CT ABD & PELVIS W/O CONTRAST: CPT

## 2024-11-08 PROCEDURE — 25010000002 VANCOMYCIN 1 G RECONSTITUTED SOLUTION 1 EACH VIAL: Performed by: HOSPITALIST

## 2024-11-08 PROCEDURE — 80048 BASIC METABOLIC PNL TOTAL CA: CPT | Performed by: INTERNAL MEDICINE

## 2024-11-08 PROCEDURE — 85027 COMPLETE CBC AUTOMATED: CPT

## 2024-11-08 PROCEDURE — 83735 ASSAY OF MAGNESIUM: CPT | Performed by: HOSPITALIST

## 2024-11-08 RX ORDER — HEPARIN SODIUM 5000 [USP'U]/ML
5000 INJECTION, SOLUTION INTRAVENOUS; SUBCUTANEOUS EVERY 8 HOURS SCHEDULED
Status: DISCONTINUED | OUTPATIENT
Start: 2024-11-08 | End: 2024-11-11

## 2024-11-08 RX ORDER — MIDODRINE HYDROCHLORIDE 5 MG/1
10 TABLET ORAL
Status: DISCONTINUED | OUTPATIENT
Start: 2024-11-08 | End: 2024-11-09

## 2024-11-08 RX ORDER — DEXTROSE MONOHYDRATE 50 MG/ML
125 INJECTION, SOLUTION INTRAVENOUS CONTINUOUS
Status: DISCONTINUED | OUTPATIENT
Start: 2024-11-09 | End: 2024-11-09

## 2024-11-08 RX ORDER — MORPHINE SULFATE 2 MG/ML
1 INJECTION, SOLUTION INTRAMUSCULAR; INTRAVENOUS
Status: DISPENSED | OUTPATIENT
Start: 2024-11-08 | End: 2024-11-13

## 2024-11-08 RX ADMIN — MUPIROCIN 1 APPLICATION: 20 OINTMENT TOPICAL at 08:36

## 2024-11-08 RX ADMIN — CEFEPIME 2000 MG: 2 INJECTION, POWDER, FOR SOLUTION INTRAVENOUS at 20:11

## 2024-11-08 RX ADMIN — DEXTROSE MONOHYDRATE 125 ML/HR: 50 INJECTION, SOLUTION INTRAVENOUS at 21:14

## 2024-11-08 RX ADMIN — HEPARIN SODIUM 5000 UNITS: 5000 INJECTION INTRAVENOUS; SUBCUTANEOUS at 13:55

## 2024-11-08 RX ADMIN — HEPARIN SODIUM 5000 UNITS: 5000 INJECTION INTRAVENOUS; SUBCUTANEOUS at 21:15

## 2024-11-08 RX ADMIN — MUPIROCIN 1 APPLICATION: 20 OINTMENT TOPICAL at 20:11

## 2024-11-08 RX ADMIN — VANCOMYCIN HYDROCHLORIDE 1000 MG: 1 INJECTION, POWDER, LYOPHILIZED, FOR SOLUTION INTRAVENOUS at 08:51

## 2024-11-08 RX ADMIN — MORPHINE SULFATE 1 MG: 2 INJECTION, SOLUTION INTRAMUSCULAR; INTRAVENOUS at 13:59

## 2024-11-08 RX ADMIN — Medication: at 08:30

## 2024-11-08 RX ADMIN — SODIUM BICARBONATE: 84 INJECTION, SOLUTION INTRAVENOUS at 08:36

## 2024-11-08 RX ADMIN — VASOPRESSIN 0.03 UNITS/MIN: 20 INJECTION, SOLUTION INTRAVENOUS at 16:13

## 2024-11-08 RX ADMIN — SODIUM BICARBONATE: 84 INJECTION, SOLUTION INTRAVENOUS at 17:47
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
## 2024-11-08 NOTE — PROGRESS NOTES
Nutrition Services    Patient Name:  Sabas Jefferson  YOB: 1940  MRN: [id number]  Admit Date:  11/7/2024    KUB verified NG tube placement at distal end of the esophagus. NG tube was removed by nursing. RD and associate RD tried placing cortrak once more with out success. Recommend to send pt to IR for attempted placement. Tube feeding recommendations are in previous nutrition note.     Electronically signed by:  Mary Lou Qureshi RD  11/08/24 17:14 EST

## 2024-11-08 NOTE — PROGRESS NOTES
"[facility] Clinical Pharmacy Services: Vancomycin Monitoring Note    Sabas Jefferson is a 84 y.o. male who is on day 1/5 of pharmacy to dose vancomycin for Empiric and Urinary Tract Infection.    Previous Vancomycin Dose:   intermittent dosing  Updated Cultures and Sensitivities:   BCx x2 sets & UCx in process   Results from last 7 days   Lab Units 11/08/24  0552   VANCOMYCIN RM mcg/mL 9.50     Vitals/Labs  Ht: 177.8 cm (70\"); Wt: 54.1 kg (119 lb 4.3 oz)   Temp Readings from Last 1 Encounters:   11/08/24 98.6 °F (37 °C) (Rectal)     Estimated Creatinine Clearance: 12.2 mL/min (A) (by C-G formula based on SCr of 3.58 mg/dL (H)).   Results from last 7 days   Lab Units 11/08/24  0441 11/08/24  0357 11/07/24  2312 11/07/24  1833   CREATININE mg/dL  --  3.58* 3.77* 4.37*   WBC 10*3/mm3 11.35*  --  8.87 10.23     Assessment/Plan    Current Vancomycin Dose: Continue intermittent dosing based on unstable SCr. Ordered another 1000 mg IV x1 dose to be administered this morning.   Next Level Date and Time: Vanc Random on 11/9 with AM labs.   We will continue to monitor patient changes and renal function     Thank you for involving pharmacy in this patient's care. Please contact pharmacy with any questions or concerns.       Koby Self, PharmD  Clinical Pharmacist          "
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
## 2024-11-08 NOTE — PROGRESS NOTES
Note radiology finding regarding rectal temp probe.  Discussed with Dr. Fierro who will consult

## 2024-11-08 NOTE — PROGRESS NOTES
[facility]     Mar/chart reviewed  Follow up hypernatremia, sepsis  Patient unable to provide meaningful subjective    Vital Sign Min/Max for last 24 hours  Temp  Min: 93.2 °F (34 °C)  Max: 98.6 °F (37 °C)   BP  Min: 79/55  Max: 109/57   Pulse  Min: 80  Max: 113   Resp  Min: 25  Max: 27   SpO2  Min: 98 %  Max: 100 %   No data recorded   Weight  Min: 54 kg (119 lb 0.8 oz)  Max: 54.6 kg (120 lb 5.9 oz)   1950/210  Appears ill,confused, makes eye contact doesn't answer questions  thin  perrl, eomi, normal sclera  Mm dry, no jvd, trachea midline, neck supple,  chest cta bilaterally, no crackles, no wheezes,   rrr,   soft, nt, nd +bs,  no c/c/e  Skin warm, dry no rashes    Labs: 11/8: reviewed:  Wbc 11  Hgb 8.4  Plts 81  Glucose 91  Bun 108  Cr 3.6  Na 166  Bicarb 14  Lactate 1.4  Micro: no growth so far    11/7 ct  head: nothing acute  11/7: CXR: bilateral ureteral stents and 12mm right renal stone    A/P:  Severe hypernatremia -- improving, appreciate nephrology help, discussed with Dr. Swain  LEVON on CKD -- agree with ct abd/pelvis -- avoid nephrotoxins, support BP,   UTI with chronic gonsalez catheter, bilateral urethral stents  Severe sepsis -- continue antibiotics, fluids  Thrombocytopenia  Anemia  Elevated troponin  Chronic left hemidiaphragm elevation  Diffuse cerebellar atrophy    Add subcu heparin for dvt prophylaxis -- stop if plts continue to drop    CC 36 mins.     Patient is new to me today

## 2024-11-08 NOTE — CONSULTS
[facility]    Patient Name:  Sabas Jefferson  YOB: 1940  MRN: [id number]  Admit Date:  11/7/2024    RD placed cortrak to depth of 45 cm but was not able to safely advance past that. KUB ordered to verify placement.     Electronically signed by:  Moris Hall RDN, LD  11/08/24 16:31 EST

## 2024-11-08 NOTE — NURSING NOTE
Cwon consult received.  Patient with a small, 1.5 cm x 1.0 cm x 0.1 cm partial thickness wound to left ischium.  This is consistent with a stage 2 pressure injury, POA.  Coccyx and buttocks also with partial thickness tissue loss and areas of pink, resurfaced skin, indicating an old, healed injury.  A sacral optifoam is CDI and covering areas for protection.  This is appropriate and I will recommend it be continued.  Left ischial PI with a 4x4 Optifoam in place and this is also appropriate and I will recommend this be continued.  Heels are elevated on pillows.  I will add orders in Epic as well as prevention measures.  Please don't hesitate to call with any questions.

## 2024-11-08 NOTE — ED NOTES
Nursing report ED to floor  Sabas Jefferson  84 y.o.  male    HPI :  HPI  Stated Reason for Visit: AMS    Chief Complaint  Chief Complaint   Patient presents with    Altered Mental Status       Admitting doctor:   Darren Chin MD    Admitting diagnosis:   The primary encounter diagnosis was Hypernatremia. Diagnoses of Altered mental status, unspecified altered mental status type and Acute renal failure, unspecified acute renal failure type were also pertinent to this visit.    Code status:   Current Code Status       Date Active Code Status Order ID Comments User Context       11/7/2024 2143 CPR (Attempt to Resuscitate) [id number]  Darren Chin MD ED        Question Answer    Code Status (Patient has no pulse and is not breathing) CPR (Attempt to Resuscitate)    Medical Interventions (Patient has pulse or is breathing) Full Support                    Allergies:   Patient has no known allergies.    Isolation:   No active isolations    Intake and Output    Intake/Output Summary (Last 24 hours) at 11/7/2024 2148  Last data filed at 11/7/2024 2120  Gross per 24 hour   Intake 500 ml   Output 10 ml   Net 490 ml       Weight:       11/07/24  1801   Weight: 54.6 kg (120 lb 5.9 oz)       Most recent vitals:   Vitals:    11/07/24 1911 11/07/24 1956 11/07/24 2041 11/07/24 2056   BP: 102/63 102/66 96/61 93/57   Pulse: 80 92 84 92   Resp:       Temp:       TempSrc:       SpO2: 99% 99% 100% 100%   Weight:           Active LDAs/IV Access:   Lines, Drains & Airways       Active LDAs       Name Placement date Placement time Site Days    Peripheral IV 11/07/24 1800 Anterior;Distal;Left Forearm 11/07/24  1800  Forearm  less than 1    Peripheral IV 11/07/24 1840 Left Antecubital 11/07/24 1840  Antecubital  less than 1    Urethral Catheter Silicone 16 Fr. 11/07/24 1953  -- less than 1                    Labs (abnormal labs have a star):   Labs Reviewed   COMPREHENSIVE METABOLIC PANEL - Abnormal; Notable for the following  components:       Result Value    Glucose 252 (*)      (*)     Creatinine 4.37 (*)     Sodium 172 (*)     Chloride 140 (*)     CO2 16.0 (*)     Calcium 8.2 (*)     Total Protein 5.3 (*)     Albumin 2.3 (*)     BUN/Creatinine Ratio 27.2 (*)     Anion Gap 16.0 (*)     eGFR 12.8 (*)     All other components within normal limits    Narrative:     GFR Normal >60  Chronic Kidney Disease <60  Kidney Failure <15    The GFR formula is only valid for adults with stable renal function between ages 18 and 70.   URINALYSIS W/ CULTURE IF INDICATED - Abnormal; Notable for the following components:    Color, UA Brown (*)     Appearance, UA Turbid (*)     Blood, UA Large (3+) (*)     Protein, UA >=300 mg/dL (3+) (*)     Leuk Esterase, UA Large (3+) (*)     All other components within normal limits    Narrative:     In absence of clinical symptoms, the presence of pyuria, bacteria, and/or nitrites on the urinalysis result does not correlate with infection.   SINGLE HS TROPONIN T - Abnormal; Notable for the following components:    HS Troponin T 172 (*)     All other components within normal limits    Narrative:     High Sensitive Troponin T Reference Range:  <14.0 ng/L- Negative Female for AMI  <22.0 ng/L- Negative Male for AMI  >=14 - Abnormal Female indicating possible myocardial injury.  >=22 - Abnormal Male indicating possible myocardial injury.   Clinicians would have to utilize clinical acumen, EKG, Troponin, and serial changes to determine if it is an Acute Myocardial Infarction or myocardial injury due to an underlying chronic condition.        LACTIC ACID, PLASMA - Abnormal; Notable for the following components:    Lactate 4.2 (*)     All other components within normal limits   CBC WITH AUTO DIFFERENTIAL - Abnormal; Notable for the following components:    RBC 2.82 (*)     Hemoglobin 8.5 (*)     Hematocrit 26.2 (*)     RDW 15.9 (*)     Platelets 83 (*)     Neutrophil % 79.6 (*)     Lymphocyte % 15.0 (*)     Monocyte %  4.7 (*)     Eosinophil % 0.0 (*)     Immature Grans % 0.7 (*)     Neutrophils, Absolute 8.15 (*)     Immature Grans, Absolute 0.07 (*)     All other components within normal limits   HIGH SENSITIVITIY TROPONIN T 2HR - Abnormal; Notable for the following components:    HS Troponin T 165 (*)     Troponin T Delta -7 (*)     All other components within normal limits    Narrative:     High Sensitive Troponin T Reference Range:  <14.0 ng/L- Negative Female for AMI  <22.0 ng/L- Negative Male for AMI  >=14 - Abnormal Female indicating possible myocardial injury.  >=22 - Abnormal Male indicating possible myocardial injury.   Clinicians would have to utilize clinical acumen, EKG, Troponin, and serial changes to determine if it is an Acute Myocardial Infarction or myocardial injury due to an underlying chronic condition.        URINALYSIS, MICROSCOPIC ONLY - Abnormal; Notable for the following components:    RBC, UA Too Numerous to Count (*)     WBC, UA Too Numerous to Count (*)     Bacteria, UA 2+ (*)     Squamous Epithelial Cells, UA 7-12 (*)     All other components within normal limits   POCT GLUCOSE FINGERSTICK - Abnormal; Notable for the following components:    Glucose 223 (*)     All other components within normal limits   BLOOD CULTURE   BLOOD CULTURE   URINE CULTURE   LACTIC ACID, REFLEX   CBC AND DIFFERENTIAL    Narrative:     The following orders were created for panel order CBC & Differential.  Procedure                               Abnormality         Status                     ---------                               -----------         ------                     CBC Auto Differential[[id number]]        Abnormal            Final result                 Please view results for these tests on the individual orders.       EKG:   ECG 12 Lead Altered Mental Status   Preliminary Result   HEART RATE=82  bpm   RR Ojyuctfc=987  ms   FL Interval=  ms   P Horizontal Axis=  deg   P Front Axis=  deg   QRSD Interval=160  ms    QT Qosmgiyc=824  ms   EStS=362  ms   QRS Axis=21  deg   T Wave Axis=110  deg   - ABNORMAL ECG -   Atrial flutter   Nonspecific intraventricular conduction delay   Borderline T abnormalities, lateral leads   Date and Time of Study:2024-11-07 19:05:02          Meds given in ED:   Medications   lactated ringers infusion (150 mL/hr Intravenous New Bag 11/7/24 1846)   vancomycin (VANCOCIN) 1,000 mg in sodium chloride 0.9 % 250 mL IVPB-VTB (1,000 mg Intravenous New Bag 11/7/24 2125)   sodium chloride 0.45 % infusion (has no administration in time range)   Pharmacy to dose vancomycin (has no administration in time range)   cefepime 1000 mg IVPB in 100 mL NS (MBP) (has no administration in time range)   cefepime 2000 mg IVPB in 100 mL NS (MBP) (0 mg Intravenous Stopped 11/7/24 2120)       Imaging results:  CT Head Without Contrast    Result Date: 11/7/2024  Diffuse cerebral and cerebellar atrophy associated with ventricular enlargement. Prominent anterior bifrontal extra-axial spaces due to atrophy or potential small chronic subdural hygromas. 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 No evidence for acute intracranial abnormality. There are no previous studies for comparison.  This report was finalized on 11/7/2024 9:16 PM by Jhonny Crowder M.D on Workstation: [id number]      XR Chest 1 View    Result Date: 11/7/2024  1. Widening of the superior mediastinum appears to be related to tortuosity and suspected enlargement of the thoracic aorta. This is without definite change allowing for patient rotation to the right though evaluation for thoracic aortic enlargement could be performed with CT. Lungs appear clear. There is chronic elevation of the left hemidiaphragm. 2. Bowel loops in the upper abdomen. 3. Bilateral ureteral stents. 12 mm right renal stone.  This report was finalized on 11/7/2024 8:06 PM by Jhonny Crowder M.D on Workstation: [id number]       Ambulatory  status:   - bedrest    Social issues:   Social History     Socioeconomic History    Marital status:    Tobacco Use    Smoking status: Former     Current packs/day: 1.00     Average packs/day: 1 pack/day for 10.0 years (10.0 ttl pk-yrs)     Types: Cigarettes    Smokeless tobacco: Never   Substance and Sexual Activity    Alcohol use: Yes     Alcohol/week: 7.0 standard drinks of alcohol     Types: 7 Glasses of wine per week    Drug use: No       Peripheral Neurovascular  Peripheral Neurovascular (Adult)  Peripheral Neurovascular WDL: WDL    Neuro Cognitive  Neuro Cognitive (Adult)  Cognitive/Neuro/Behavioral WDL: .WDL except, all  Level of Consciousness: Alert  Arousal Level: opens eyes spontaneously  Orientation: disoriented to, person, place, time, situation  Speech: other (see comments) (nonverbal)  Mood/Behavior: calm, behavior appropriate to situation, cooperative    Learning  Learning Assessment  Learning Readiness and Ability: cognitive limitation noted    Respiratory  Respiratory WDL  Respiratory WDL: .WDL except, all  Rhythm/Pattern, Respiratory: tachypneic, shortness of breath    Abdominal Pain       Pain Assessments  Pain (Adult)  Preferred Pain Scale: PAINAD (Pain Assessment in Advance Dementia Scale)  PAINAD Breathin-->normal  PAINAD Negative Vocalization: 1-->occasional moan/groan, low speech, negative/disapproving quality  PAINAD Facial Expression: 2-->facial grimacing  PAINAD Body Language: 1-->tense, distressed pacing, fidgeting  PAINAD Consolability: 0-->no need to console  PAINAD Score: 4    NIH Stroke Scale       Katherine Mcgraw RN  24 21:48 EST

## 2024-11-08 NOTE — PLAN OF CARE
Goal Outcome Evaluation:        Problem: Malnutrition  Goal: Improved Nutritional Intake  Outcome: Progressing

## 2024-11-08 NOTE — PROGRESS NOTES
[facility]: Vancomycin Pharmacokinetic Initial Consult Note    Sabas Jefferson is a 84 y.o. male who is on day 1 of pharmacy to dose vancomycin.    Indication: Empiric and Urinary Tract Infection  Consulting Provider: Elsy  Planned Duration of Therapy: 5 days  Loading Dose Ordered or Given: 1000 mg on 11/7 at 2125  MRSA PCR performed: no  Culture/Source:   11/7 BloodCx - pending x2  11/7 UrineCx - pending  Target: Dose by Levels  Pertinent Vanc Dosing History: n/a  Other Antimicrobials: cefepime    Vitals/Labs  Ht:  ; Wt: 54.6 kg (120 lb 5.9 oz)  Temp Readings from Last 1 Encounters:   11/07/24 93.2 °F (34 °C) (Rectal)    CrCl cannot be calculated (Unknown ideal weight.).  Acute renal failure     Results from last 7 days   Lab Units 11/07/24  1833   CREATININE mg/dL 4.37*   WBC 10*3/mm3 10.23     Assessment/Plan:    Vancomycin Dose: no further dose needed right now  Vanc Random has been ordered for 11/8 at 0600 with AM labs     Pharmacy will follow patient's kidney function and will adjust doses and obtain levels as necessary. Thank you for involving pharmacy in this patient's care. Please contact pharmacy with any questions or concerns.                           Ruth Wong, [facility]  Clinical Pharmacist

## 2024-11-08 NOTE — PLAN OF CARE
Goal Outcome Evaluation:      Pt arrived to CICU overnight. Sodium being monitored closely. 1/2 NS @ 75 per nephrology. Rechecking labs q4. On low-dose levophed. Temp 97-98 now. 200 UOP, urine is very cloudy. Pt not following commands but does move all extremities.

## 2024-11-09 ENCOUNTER — APPOINTMENT (OUTPATIENT)
Dept: GENERAL RADIOLOGY | Facility: HOSPITAL | Age: 84
End: 2024-11-09
Payer: MEDICARE

## 2024-11-09 PROBLEM — E43 SEVERE PROTEIN-CALORIE MALNUTRITION: Status: ACTIVE | Noted: 2024-11-09

## 2024-11-09 LAB
ALBUMIN SERPL-MCNC: 2.2 G/DL (ref 3.5–5.2)
ANION GAP SERPL CALCULATED.3IONS-SCNC: 11.2 MMOL/L (ref 5–15)
BASOPHILS # BLD AUTO: 0.01 10*3/MM3 (ref 0–0.2)
BASOPHILS NFR BLD AUTO: 0.1 % (ref 0–1.5)
BUN SERPL-MCNC: 112 MG/DL (ref 8–23)
BUN/CREAT SERPL: 35.2 (ref 7–25)
CALCIUM SPEC-SCNC: 7.7 MG/DL (ref 8.6–10.5)
CHLORIDE SERPL-SCNC: 129 MMOL/L (ref 98–107)
CO2 SERPL-SCNC: 17.8 MMOL/L (ref 22–29)
CREAT SERPL-MCNC: 3.18 MG/DL (ref 0.76–1.27)
DEPRECATED RDW RBC AUTO: 53 FL (ref 37–54)
EGFRCR SERPLBLD CKD-EPI 2021: 18.7 ML/MIN/1.73
EOSINOPHIL # BLD AUTO: 0.03 10*3/MM3 (ref 0–0.4)
EOSINOPHIL NFR BLD AUTO: 0.3 % (ref 0.3–6.2)
ERYTHROCYTE [DISTWIDTH] IN BLOOD BY AUTOMATED COUNT: 15.9 % (ref 12.3–15.4)
GLUCOSE BLDC GLUCOMTR-MCNC: 132 MG/DL (ref 70–130)
GLUCOSE BLDC GLUCOMTR-MCNC: 172 MG/DL (ref 70–130)
GLUCOSE BLDC GLUCOMTR-MCNC: 184 MG/DL (ref 70–130)
GLUCOSE SERPL-MCNC: 177 MG/DL (ref 65–99)
HCT VFR BLD AUTO: 23.6 % (ref 37.5–51)
HGB BLD-MCNC: 7.7 G/DL (ref 13–17.7)
IMM GRANULOCYTES # BLD AUTO: 0.18 10*3/MM3 (ref 0–0.05)
IMM GRANULOCYTES NFR BLD AUTO: 1.7 % (ref 0–0.5)
LYMPHOCYTES # BLD AUTO: 1.82 10*3/MM3 (ref 0.7–3.1)
LYMPHOCYTES NFR BLD AUTO: 17.1 % (ref 19.6–45.3)
MAGNESIUM SERPL-MCNC: 2.5 MG/DL (ref 1.6–2.4)
MCH RBC QN AUTO: 30.2 PG (ref 26.6–33)
MCHC RBC AUTO-ENTMCNC: 32.6 G/DL (ref 31.5–35.7)
MCV RBC AUTO: 92.5 FL (ref 79–97)
MONOCYTES # BLD AUTO: 0.61 10*3/MM3 (ref 0.1–0.9)
MONOCYTES NFR BLD AUTO: 5.7 % (ref 5–12)
NEUTROPHILS NFR BLD AUTO: 7.99 10*3/MM3 (ref 1.7–7)
NEUTROPHILS NFR BLD AUTO: 75.1 % (ref 42.7–76)
NRBC BLD AUTO-RTO: 0.3 /100 WBC (ref 0–0.2)
PHOSPHATE SERPL-MCNC: 3.4 MG/DL (ref 2.5–4.5)
PLATELET # BLD AUTO: 80 10*3/MM3 (ref 140–450)
PMV BLD AUTO: 12.5 FL (ref 6–12)
POTASSIUM SERPL-SCNC: 4 MMOL/L (ref 3.5–5.2)
RBC # BLD AUTO: 2.55 10*6/MM3 (ref 4.14–5.8)
SODIUM SERPL-SCNC: 158 MMOL/L (ref 136–145)
WBC NRBC COR # BLD AUTO: 10.64 10*3/MM3 (ref 3.4–10.8)

## 2024-11-09 PROCEDURE — 85025 COMPLETE CBC W/AUTO DIFF WBC: CPT | Performed by: HOSPITALIST

## 2024-11-09 PROCEDURE — 99231 SBSQ HOSP IP/OBS SF/LOW 25: CPT | Performed by: SURGERY

## 2024-11-09 PROCEDURE — 74018 RADEX ABDOMEN 1 VIEW: CPT

## 2024-11-09 PROCEDURE — 83735 ASSAY OF MAGNESIUM: CPT | Performed by: HOSPITALIST

## 2024-11-09 PROCEDURE — 25010000002 CEFEPIME PER 500 MG: Performed by: HOSPITALIST

## 2024-11-09 PROCEDURE — 25010000002 HEPARIN (PORCINE) PER 1000 UNITS: Performed by: INTERNAL MEDICINE

## 2024-11-09 PROCEDURE — 0 DEXTROSE 5 % SOLUTION: Performed by: INTERNAL MEDICINE

## 2024-11-09 PROCEDURE — 80069 RENAL FUNCTION PANEL: CPT | Performed by: INTERNAL MEDICINE

## 2024-11-09 PROCEDURE — 82948 REAGENT STRIP/BLOOD GLUCOSE: CPT

## 2024-11-09 RX ORDER — MIDODRINE HYDROCHLORIDE 5 MG/1
5 TABLET ORAL
Status: DISCONTINUED | OUTPATIENT
Start: 2024-11-09 | End: 2024-11-21

## 2024-11-09 RX ORDER — DEXTROSE MONOHYDRATE 50 MG/ML
150 INJECTION, SOLUTION INTRAVENOUS CONTINUOUS
Status: DISCONTINUED | OUTPATIENT
Start: 2024-11-09 | End: 2024-11-10

## 2024-11-09 RX ADMIN — DEXTROSE MONOHYDRATE 150 ML/HR: 50 INJECTION, SOLUTION INTRAVENOUS at 20:42

## 2024-11-09 RX ADMIN — HEPARIN SODIUM 5000 UNITS: 5000 INJECTION INTRAVENOUS; SUBCUTANEOUS at 05:17

## 2024-11-09 RX ADMIN — MIDODRINE HYDROCHLORIDE 5 MG: 5 TABLET ORAL at 16:52

## 2024-11-09 RX ADMIN — DEXTROSE MONOHYDRATE 150 ML/HR: 50 INJECTION, SOLUTION INTRAVENOUS at 13:39

## 2024-11-09 RX ADMIN — NOREPINEPHRINE BITARTRATE 0.18 MCG/KG/MIN: 32 SOLUTION INTRAVENOUS at 01:08

## 2024-11-09 RX ADMIN — SENNOSIDES AND DOCUSATE SODIUM 2 TABLET: 50; 8.6 TABLET ORAL at 21:14

## 2024-11-09 RX ADMIN — HEPARIN SODIUM 5000 UNITS: 5000 INJECTION INTRAVENOUS; SUBCUTANEOUS at 21:17

## 2024-11-09 RX ADMIN — DEXTROSE MONOHYDRATE 125 ML/HR: 50 INJECTION, SOLUTION INTRAVENOUS at 05:17

## 2024-11-09 RX ADMIN — HEPARIN SODIUM 5000 UNITS: 5000 INJECTION INTRAVENOUS; SUBCUTANEOUS at 13:37

## 2024-11-09 RX ADMIN — MIDODRINE HYDROCHLORIDE 5 MG: 5 TABLET ORAL at 12:09

## 2024-11-09 RX ADMIN — MUPIROCIN 1 APPLICATION: 20 OINTMENT TOPICAL at 09:18

## 2024-11-09 RX ADMIN — CEFEPIME 2000 MG: 2 INJECTION, POWDER, FOR SOLUTION INTRAVENOUS at 21:14

## 2024-11-09 NOTE — PLAN OF CARE
Goal Outcome Evaluation:  Plan of Care Reviewed With: patient, family        Progress: improving     Pt remains in CICU, vaso off and weaning levo as able to maintain MAP above 65. Na+ trending down, no BM, had 600 UOP. Will continue to monitor.

## 2024-11-09 NOTE — PROGRESS NOTES
Dr. ARA Contreras    [facility] CARDIAC INTENSIVE CARE        Patient ID:  Name:  Sabas Jefferson  MRN:  [id number]  1940  84 y.o.  male            CC/Reason for visit: Hyponatremia and many other medical problems listed below    Interval hx: Patient nonverbal.  Eyes are open.  He does not track visually.  Does not answer my questions.  Appears unkept, chronically ill.  Chart reviewed.  Notes from all other medical caregivers addressed.  CT scan of abdomen and pelvis did not show temperature probe causing rectal perforation.  This has been since removed.  Patient on broad-spectrum antibiotics for sepsis, source likely urinary.  Patient does have ureteral stents and chronic indwelling Kim urinary bladder catheter    ROS: Unobtainable, patient nonverbal    I reviewed old medical records.  Past medical history, social history and family history: Unchanged from admission H&P.      Vitals:  Vitals:    11/09/24 0700 11/09/24 0715 11/09/24 0730 11/09/24 0745   BP: 116/65 111/75 114/70 112/69   Pulse: 95 93 102 95   Resp:       Temp:       TempSrc:       SpO2: 100% 100% 100% 100%   Weight:       Height:               Body mass index is 17.11 kg/m².    Intake/Output Summary (Last 24 hours) at 11/9/2024 0814  Last data filed at 11/9/2024 0400  Gross per 24 hour   Intake 1281 ml   Output 950 ml   Net 331 ml       Exam:  GEN:  No distress  Alert, eyes are open, but nonverbal.  Does not answer my questions, does not track visually, does not follow any commands  Appears poorly nourished, chronically ill  LUNGS: Clear breath sounds bilat, no use of accessory muscles  CV:  Normal S1S2, without murmur, no edema  ABD:  Non tender, no enlarged liver or masses      Scheduled meds:  cefepime, 2,000 mg, Intravenous, Q24H  heparin (porcine), 5,000 Units, Subcutaneous, Q8H  midodrine, 10 mg, Oral, TID AC  mupirocin, 1 Application, Each Nare, BID  senna-docusate sodium, 2 tablet, Oral, BID      IV meds:                       dextrose, 125 mL/hr, Last Rate: 125 mL/hr (11/09/24 0517)  norepinephrine, 0.02-0.3 mcg/kg/min, Last Rate: 0.06 mcg/kg/min (11/09/24 0758)  vasopressin, 0.03 Units/min, Last Rate: Stopped (11/08/24 1952)        Data Review:   I reviewed the patient's medications and new clinical results.          Lab Results   Component Value Date    CALCIUM 7.7 (L) 11/09/2024    PHOS 3.4 11/09/2024    MG 2.5 (H) 11/09/2024    MG 2.9 (H) 11/08/2024    MG 2.3 04/13/2024     Results from last 7 days   Lab Units 11/09/24  0313 11/08/24 2023 11/08/24  1343 11/08/24  0441 11/08/24  0357 11/07/24  2312 11/07/24  1833   SODIUM mmol/L 158* 165* 161*  --    < > 166* 172*   POTASSIUM mmol/L 4.0 4.2 4.2  --    < > 5.0 5.0   CHLORIDE mmol/L 129* 133* 137*  --    < > 140* 140*   CO2 mmol/L 17.8* 19.0* 17.9*  --    < > 14.0* 16.0*   BUN mg/dL 112* 109* 105*  --    < > 112* 119*   CREATININE mg/dL 3.18* 3.32* 3.79*  --    < > 3.77* 4.37*   CALCIUM mg/dL 7.7* 7.4* 7.8*  --    < > 7.6* 8.2*   BILIRUBIN mg/dL  --   --   --   --   --   --  0.3   ALK PHOS U/L  --   --   --   --   --   --  76   ALT (SGPT) U/L  --   --   --   --   --   --  11   AST (SGOT) U/L  --   --   --   --   --   --  12   GLUCOSE mg/dL 177* 186* 140*  --    < > 128* 252*   WBC 10*3/mm3 10.64  --   --  11.35*  --  8.87 10.23   HEMOGLOBIN g/dL 7.7*  --   --  8.4*  --  7.9* 8.5*   PLATELETS 10*3/mm3 80*  --   --  81*  --  77* 83*    < > = values in this interval not displayed.     Results from last 7 days   Lab Units 11/07/24 2054 11/07/24 2043   BLOODCX   --  No growth at 24 hours  No growth at 24 hours   URINECX  Growth present, too young to evaluate  --          Results from last 7 days   Lab Units 11/07/24 2043 11/07/24  1833   HSTROP T ng/L 165* 172*            ASSESSMENT:     Hypernatremia, severe, likely causing encephalopathy  Acute metabolic encephalopathy  Severe sepsis present on admission   Acute kidney injury  Chronic kidney disease  Urinary tract infection  Chronic  indwelling urinary bladder Kim catheter  Bilateral ureteral stents, chronic  Thrombocytopenia  Anemia  Elevated troponin  Chronic left hemidiaphragm elevation  Diffuse brain atrophy  Dementia      PLAN:  Patient remains critically ill.  Appreciate nephrology assisting with management of fluids.  Hypernatremia is slightly better, expect gradual, slow improvement in sodium levels over the next few days.  Patient has baseline dementia due to diffuse brain atrophy, is very frail, elderly and poorly nourished.  He is chronically ill.  I do not expect his mental status to improve much due to underlying brain atrophy and dementia.  As sodium levels improved, we may see some improvement in mental status but I doubt at baseline he has high IQ or cognition.  Continue to monitor creatinine.  Patient has indwelling Kim catheter and ureteral stents in place.  Blood cultures and urine cultures negative since admission.  Has been on cefepime since admission.  MRSA negative therefore vancomycin was stopped.  Complete a total of 5 days of cefepime for probable urinary source of sepsis.  Continue with IV fluids as per nephrology, try to wean off of Levophed and vasopressin.  Patient has chronic hypotension, usually on midodrine.  Continue same.  On low-dose heparin for DVT prophylaxis subcutaneously, monitor platelet levels.  Obtain daily CBC to monitor anemia and thrombocytopenia, transfuse blood products if needed.    Critical care time 35-minute    I reviewed the chart and other providers notes and reviewed labs.  Copied text in this note has been reviewed and is accurate as of today      Gautam Contreras MD  11/9/2024

## 2024-11-09 NOTE — PLAN OF CARE
Problem: Adult Inpatient Plan of Care  Goal: Plan of Care Review  11/9/2024 1740 by Valentina Jones, RN  Outcome: Not Progressing  Flowsheets (Taken 11/9/2024 1734)  Outcome Evaluation: Pt off pressor support. NG tube placed/ advanced w/ KUB verification -Dr. Reaves confirmed. TF started per Hope, RD recommendations noted prior. D5 increased, per renal.B/p soft, but stable. Midodrine added. Marginal urine output. Still no BM. Pt son, Sabas Lowery Request palliative care to come discuss goals of care. Family updated at bedside.   11/9/2024 1738 by Valentina Jones, RN  Flowsheets (Taken 11/9/2024 1734)  Plan of Care Reviewed With:   spouse   child  11/9/2024 1733 by Valentina Jones, RN  Outcome: Not Progressing

## 2024-11-09 NOTE — PROGRESS NOTES
CC: Iatrogenic rectal perforation    S: No events overnight    O:   Vitals:    11/09/24 1400 11/09/24 1415 11/09/24 1445 11/09/24 1500   BP: 96/62 98/64 101/60 104/64   Pulse: 87 91 91 90   Resp:       Temp:       TempSrc:       SpO2: 100% 98%     Weight:       Height:          Alert, no acute distress  Breathing comfortable  Abdomen soft and nontender    Blood cell count 10,000, hemoglobin 7.7    Assessment and plan    84-year-old male with iatrogenic perforation of the anterior wall of the rectum at the extraperitoneal space.  The probe  was removed and there has not been any complications from this.  I discussed with the family that he may develop pelvic abscess although I think the chances are very low.  If he started to have any signs of infection then repeat CT scan of the pelvis will be ordered.    I agree with the antibiotics for UTI  I will sign off for now, call with questions

## 2024-11-09 NOTE — PROGRESS NOTES
[facility] Progress Note      Patient Name: Sabas Jefferson  : 1940  MRN: [id number]  Primary Care Physician:  Koby Holbrook MD  Date of admission: 2024    Subjective     Interval History:   Follow-up acute kidney injury and severe hypernatremia    The patient is poorly responsive eyes are open, he is still on low-dose vasopressors    Review of Systems:   Not obtainable    Objective     Vitals:   Temp:  [96.3 °F (35.7 °C)-97.8 °F (36.6 °C)] 97.5 °F (36.4 °C)  Heart Rate:  [] 95  Resp:  [26] 26  BP: ()/(51-87) 112/69    Intake/Output Summary (Last 24 hours) at 2024 0911  Last data filed at 2024 0400  Gross per 24 hour   Intake 1281 ml   Output 950 ml   Net 331 ml       Physical Exam:    General Appearance: Eyes are open has core track in place poorly responsive  Skin: warm and dry  HEENT: Oral mucosa is dry, has core track and  Neck: No JVD  Lungs: Bilateral rhonchi, unlabored breathing  Heart: RRR, no rub  Abdomen: soft, no guarding nondistended  : Kim catheter anchored  Extremities: no edema, cyanosis or clubbing  Neuro: Unable to assess    Scheduled Meds:     cefepime, 2,000 mg, Intravenous, Q24H  heparin (porcine), 5,000 Units, Subcutaneous, Q8H  midodrine, 5 mg, Oral, TID AC  mupirocin, 1 Application, Each Nare, BID  senna-docusate sodium, 2 tablet, Oral, BID      IV Meds:   dextrose, 125 mL/hr, Last Rate: 125 mL/hr (24 0517)  norepinephrine, 0.02-0.3 mcg/kg/min, Last Rate: 0.06 mcg/kg/min (24 0758)  vasopressin, 0.03 Units/min, Last Rate: Stopped (24)        Results Reviewed:   I have personally reviewed the results from the time of this admission to 2024 09:11 EST     Results from last 7 days   Lab Units 24  0313 24  1343 24  2312 24  1833   SODIUM mmol/L 158* 165* 161*   < > 172*   POTASSIUM mmol/L 4.0 4.2 4.2   < > 5.0   CHLORIDE mmol/L 129* 133* 137*   < > 140*   CO2  mmol/L 17.8* 19.0* 17.9*   < > 16.0*   BUN mg/dL 112* 109* 105*   < > 119*   CREATININE mg/dL 3.18* 3.32* 3.79*   < > 4.37*   CALCIUM mg/dL 7.7* 7.4* 7.8*   < > 8.2*   BILIRUBIN mg/dL  --   --   --   --  0.3   ALK PHOS U/L  --   --   --   --  76   ALT (SGPT) U/L  --   --   --   --  11   AST (SGOT) U/L  --   --   --   --  12   GLUCOSE mg/dL 177* 186* 140*   < > 252*    < > = values in this interval not displayed.       Estimated Creatinine Clearance: 13.7 mL/min (A) (by C-G formula based on SCr of 3.18 mg/dL (H)).    Results from last 7 days   Lab Units 11/09/24  0313 11/08/24  0357   MAGNESIUM mg/dL 2.5* 2.9*   PHOSPHORUS mg/dL 3.4 3.5             Results from last 7 days   Lab Units 11/09/24  0313 11/08/24  0441 11/07/24  2312 11/07/24  1833   WBC 10*3/mm3 10.64 11.35* 8.87 10.23   HEMOGLOBIN g/dL 7.7* 8.4* 7.9* 8.5*   PLATELETS 10*3/mm3 80* 81* 77* 83*             Assessment / Plan     ASSESSMENT:  Acute kidney injury secondary to prerenal azotemia and severe dehydration creatinine slightly improved down to 3.18.  Severe hypernatremia related to dehydration, sodium is 158  UTI with chronic indwelling Kim catheter treated  Hypotension with probable septic shock on vasopressors  History of nephrolithiasis and bilateral ureteral stent in place  Dementia  Anemia and thrombocytopenia, hemoglobin is 7.7 and platelet 80,000.  Hypocalcemia but when the calcium is corrected to albumin the calcium is normal calcium is 7.7 and albumin is 2.2.    PLAN:  Increase D5W to 150 cc/h  Continue the same treatment  Start midodrine when the core track is in place  Surveillance lab    I reviewed the chart and other providers notes, reviewed labs.  I discussed the case with the patient's nurse also with Dr. Contreras    Thank you for involving us in the care of Sabas Jefferson.  Please feel free to call with any questions.    Delano Mora MD  11/09/24  09:11 [facility]    [facility]  [phone]    Please note  that portions of this note were completed with a voice recognition program.

## 2024-11-10 ENCOUNTER — APPOINTMENT (OUTPATIENT)
Dept: GENERAL RADIOLOGY | Facility: HOSPITAL | Age: 84
End: 2024-11-10
Payer: MEDICARE

## 2024-11-10 LAB
ALBUMIN SERPL-MCNC: 1.9 G/DL (ref 3.5–5.2)
ALBUMIN/GLOB SERPL: 0.6 G/DL
ALP SERPL-CCNC: 67 U/L (ref 39–117)
ALT SERPL W P-5'-P-CCNC: 14 U/L (ref 1–41)
ANION GAP SERPL CALCULATED.3IONS-SCNC: 12 MMOL/L (ref 5–15)
ARTERIAL PATENCY WRIST A: POSITIVE
AST SERPL-CCNC: 21 U/L (ref 1–40)
ATMOSPHERIC PRESS: 748.1 MMHG
BASE EXCESS BLDA CALC-SCNC: -6.4 MMOL/L (ref 0–2)
BASOPHILS # BLD AUTO: 0.02 10*3/MM3 (ref 0–0.2)
BASOPHILS NFR BLD AUTO: 0.2 % (ref 0–1.5)
BDY SITE: ABNORMAL
BILIRUB SERPL-MCNC: 0.3 MG/DL (ref 0–1.2)
BUN SERPL-MCNC: 84 MG/DL (ref 8–23)
BUN/CREAT SERPL: 29.1 (ref 7–25)
CALCIUM SPEC-SCNC: 7.2 MG/DL (ref 8.6–10.5)
CHLORIDE SERPL-SCNC: 119 MMOL/L (ref 98–107)
CO2 BLDA-SCNC: 16.9 MMOL/L (ref 23–27)
CO2 SERPL-SCNC: 16 MMOL/L (ref 22–29)
CREAT SERPL-MCNC: 2.89 MG/DL (ref 0.76–1.27)
DEPRECATED RDW RBC AUTO: 53.1 FL (ref 37–54)
DEVICE COMMENT: ABNORMAL
EGFRCR SERPLBLD CKD-EPI 2021: 21 ML/MIN/1.73
EOSINOPHIL # BLD AUTO: 0.07 10*3/MM3 (ref 0–0.4)
EOSINOPHIL NFR BLD AUTO: 0.7 % (ref 0.3–6.2)
ERYTHROCYTE [DISTWIDTH] IN BLOOD BY AUTOMATED COUNT: 15.1 % (ref 12.3–15.4)
GLOBULIN UR ELPH-MCNC: 3 GM/DL
GLUCOSE BLDC GLUCOMTR-MCNC: 110 MG/DL (ref 70–130)
GLUCOSE BLDC GLUCOMTR-MCNC: 122 MG/DL (ref 70–130)
GLUCOSE SERPL-MCNC: 115 MG/DL (ref 65–99)
HCO3 BLDA-SCNC: 16.2 MMOL/L (ref 22–28)
HCT VFR BLD AUTO: 24.1 % (ref 37.5–51)
HEMODILUTION: NO
HGB BLD-MCNC: 7.8 G/DL (ref 13–17.7)
IMM GRANULOCYTES # BLD AUTO: 0.25 10*3/MM3 (ref 0–0.05)
IMM GRANULOCYTES NFR BLD AUTO: 2.4 % (ref 0–0.5)
INHALED O2 CONCENTRATION: 21 %
LYMPHOCYTES # BLD AUTO: 2.13 10*3/MM3 (ref 0.7–3.1)
LYMPHOCYTES NFR BLD AUTO: 20.7 % (ref 19.6–45.3)
MAGNESIUM SERPL-MCNC: 2.3 MG/DL (ref 1.6–2.4)
MCH RBC QN AUTO: 31.5 PG (ref 26.6–33)
MCHC RBC AUTO-ENTMCNC: 32.4 G/DL (ref 31.5–35.7)
MCV RBC AUTO: 97.2 FL (ref 79–97)
MODALITY: ABNORMAL
MONOCYTES # BLD AUTO: 0.77 10*3/MM3 (ref 0.1–0.9)
MONOCYTES NFR BLD AUTO: 7.5 % (ref 5–12)
NEUTROPHILS NFR BLD AUTO: 68.5 % (ref 42.7–76)
NEUTROPHILS NFR BLD AUTO: 7.03 10*3/MM3 (ref 1.7–7)
NRBC BLD AUTO-RTO: 0.3 /100 WBC (ref 0–0.2)
O2 A-A PPRESDIFF RESPIRATORY: 0.8 MMHG
PCO2 BLDA: 22.4 MM HG (ref 35–45)
PH BLDA: 7.47 PH UNITS (ref 7.35–7.45)
PHOSPHATE SERPL-MCNC: 3.3 MG/DL (ref 2.5–4.5)
PLATELET # BLD AUTO: 69 10*3/MM3 (ref 140–450)
PMV BLD AUTO: 12.9 FL (ref 6–12)
PO2 BLD: 478 MM[HG] (ref 0–500)
PO2 BLDA: 100.3 MM HG (ref 80–100)
POTASSIUM SERPL-SCNC: 3.8 MMOL/L (ref 3.5–5.2)
PROT SERPL-MCNC: 4.9 G/DL (ref 6–8.5)
RBC # BLD AUTO: 2.48 10*6/MM3 (ref 4.14–5.8)
SAO2 % BLDCOA: 98.3 % (ref 92–98.5)
SET MECH RESP RATE: 20
SODIUM SERPL-SCNC: 147 MMOL/L (ref 136–145)
URATE SERPL-MCNC: 8 MG/DL (ref 3.4–7)
WBC NRBC COR # BLD AUTO: 10.27 10*3/MM3 (ref 3.4–10.8)

## 2024-11-10 PROCEDURE — 36600 WITHDRAWAL OF ARTERIAL BLOOD: CPT

## 2024-11-10 PROCEDURE — 82803 BLOOD GASES ANY COMBINATION: CPT

## 2024-11-10 PROCEDURE — 25010000002 HEPARIN (PORCINE) PER 1000 UNITS: Performed by: INTERNAL MEDICINE

## 2024-11-10 PROCEDURE — 80053 COMPREHEN METABOLIC PANEL: CPT | Performed by: INTERNAL MEDICINE

## 2024-11-10 PROCEDURE — 0 DEXTROSE 5 % SOLUTION: Performed by: INTERNAL MEDICINE

## 2024-11-10 PROCEDURE — 82948 REAGENT STRIP/BLOOD GLUCOSE: CPT

## 2024-11-10 PROCEDURE — 84100 ASSAY OF PHOSPHORUS: CPT | Performed by: INTERNAL MEDICINE

## 2024-11-10 PROCEDURE — 74018 RADEX ABDOMEN 1 VIEW: CPT

## 2024-11-10 PROCEDURE — 84550 ASSAY OF BLOOD/URIC ACID: CPT | Performed by: INTERNAL MEDICINE

## 2024-11-10 PROCEDURE — 85025 COMPLETE CBC W/AUTO DIFF WBC: CPT | Performed by: INTERNAL MEDICINE

## 2024-11-10 PROCEDURE — 83735 ASSAY OF MAGNESIUM: CPT | Performed by: HOSPITALIST

## 2024-11-10 PROCEDURE — 25010000002 CEFEPIME PER 500 MG: Performed by: INTERNAL MEDICINE

## 2024-11-10 RX ORDER — DEXTROSE MONOHYDRATE 50 MG/ML
50 INJECTION, SOLUTION INTRAVENOUS CONTINUOUS
Status: ACTIVE | OUTPATIENT
Start: 2024-11-10 | End: 2024-11-11

## 2024-11-10 RX ADMIN — DEXTROSE MONOHYDRATE 150 ML/HR: 50 INJECTION, SOLUTION INTRAVENOUS at 03:39

## 2024-11-10 RX ADMIN — CEFEPIME 2000 MG: 2 INJECTION, POWDER, FOR SOLUTION INTRAVENOUS at 20:31

## 2024-11-10 RX ADMIN — MUPIROCIN 1 APPLICATION: 20 OINTMENT TOPICAL at 08:45

## 2024-11-10 RX ADMIN — MIDODRINE HYDROCHLORIDE 5 MG: 5 TABLET ORAL at 08:44

## 2024-11-10 RX ADMIN — SENNOSIDES AND DOCUSATE SODIUM 2 TABLET: 50; 8.6 TABLET ORAL at 20:31

## 2024-11-10 RX ADMIN — MIDODRINE HYDROCHLORIDE 5 MG: 5 TABLET ORAL at 16:57

## 2024-11-10 RX ADMIN — HEPARIN SODIUM 5000 UNITS: 5000 INJECTION INTRAVENOUS; SUBCUTANEOUS at 20:31

## 2024-11-10 RX ADMIN — MIDODRINE HYDROCHLORIDE 5 MG: 5 TABLET ORAL at 10:42

## 2024-11-10 RX ADMIN — HEPARIN SODIUM 5000 UNITS: 5000 INJECTION INTRAVENOUS; SUBCUTANEOUS at 16:56

## 2024-11-10 RX ADMIN — HEPARIN SODIUM 5000 UNITS: 5000 INJECTION INTRAVENOUS; SUBCUTANEOUS at 05:38

## 2024-11-10 NOTE — PROGRESS NOTES
[facility] Progress Note      Patient Name: Sabas Jefferson  : 1940  MRN: [id number]  Primary Care Physician:  Koby Holbrook MD  Date of admission: 2024    Subjective     Interval History:   Follow-up acute kidney injury and severe hypernatremia    The patient is poorly responsive eyes are open, he is off vasopressors  Review of Systems:   Not obtainable    Objective     Vitals:   Temp:  [96.8 °F (36 °C)-99 °F (37.2 °C)] 97.8 °F (36.6 °C)  Heart Rate:  [] 92  Resp:  [18-23] 23  BP: ()/(57-79) 91/61    Intake/Output Summary (Last 24 hours) at 11/10/2024 09  Last data filed at 11/10/2024 0900  Gross per 24 hour   Intake 4138 ml   Output 1430 ml   Net 2708 ml       Physical Exam:    General Appearance: Eyes are open has core track in place poorly responsive  Skin: warm and dry  HEENT: Oral mucosa is dry, has core track and  Neck: No JVD  Lungs: Bilateral rhonchi, unlabored breathing  Heart: RRR, no rub  Abdomen: soft, no guarding nondistended  : Kim catheter anchored  Extremities: no edema, cyanosis or clubbing  Neuro: Unable to assess    Scheduled Meds:     cefepime, 2,000 mg, Intravenous, Q24H  heparin (porcine), 5,000 Units, Subcutaneous, Q8H  midodrine, 5 mg, Oral, TID AC  mupirocin, 1 Application, Each Nare, BID  senna-docusate sodium, 2 tablet, Oral, BID      IV Meds:   dextrose, 150 mL/hr, Last Rate: 150 mL/hr (11/10/24 0339)  norepinephrine, 0.02-0.3 mcg/kg/min, Last Rate: Stopped (24 1043)        Results Reviewed:   I have personally reviewed the results from the time of this admission to 11/10/2024 09:22 EST     Results from last 7 days   Lab Units 11/10/24  0437 24  0313 24  2312 24  1833   SODIUM mmol/L 147* 158* 165*   < > 172*   POTASSIUM mmol/L 3.8 4.0 4.2   < > 5.0   CHLORIDE mmol/L 119* 129* 133*   < > 140*   CO2 mmol/L 16.0* 17.8* 19.0*   < > 16.0*   BUN mg/dL 84* 112* 109*   < > 119*    CREATININE mg/dL 2.89* 3.18* 3.32*   < > 4.37*   CALCIUM mg/dL 7.2* 7.7* 7.4*   < > 8.2*   BILIRUBIN mg/dL 0.3  --   --   --  0.3   ALK PHOS U/L 67  --   --   --  76   ALT (SGPT) U/L 14  --   --   --  11   AST (SGOT) U/L 21  --   --   --  12   GLUCOSE mg/dL 115* 177* 186*   < > 252*    < > = values in this interval not displayed.       Estimated Creatinine Clearance: 16.3 mL/min (A) (by C-G formula based on SCr of 2.89 mg/dL (H)).    Results from last 7 days   Lab Units 11/10/24  0437 11/09/24  0313 11/08/24  0357   MAGNESIUM mg/dL 2.3 2.5* 2.9*   PHOSPHORUS mg/dL 3.3 3.4 3.5       Results from last 7 days   Lab Units 11/10/24  0437   URIC ACID mg/dL 8.0*       Results from last 7 days   Lab Units 11/10/24  0437 11/09/24  0313 11/08/24  0441 11/07/24  2312 11/07/24  1833   WBC 10*3/mm3 10.27 10.64 11.35* 8.87 10.23   HEMOGLOBIN g/dL 7.8* 7.7* 8.4* 7.9* 8.5*   PLATELETS 10*3/mm3 69* 80* 81* 77* 83*             Assessment / Plan     ASSESSMENT:  Acute kidney injury secondary to prerenal azotemia and severe dehydration creatinine slightly improved down to 2.89.  Severe hypernatremia related to dehydration, sodium is down to 147  UTI with chronic indwelling Kim catheter treated  Hypotension with probable septic shock on vasopressors  History of nephrolithiasis and bilateral ureteral stent in place  Dementia  Anemia and thrombocytopenia, hemoglobin is 7.8 and platelet 69,000.  Hypocalcemia but when the calcium is corrected to albumin the calcium is normal calcium is 7.2 and albumin is 1.9.    PLAN:  Decrease D5W to 50 cc/h  Increase free water via feeding tube to 100 cc/h  Continue the same treatment  Continue midodrine  Surveillance lab    I reviewed the chart and other providers notes, reviewed labs.  I discussed the case with the patient's nurse also with Dr. Contreras  Copied text in this note has been reviewed and is accurate as of 11/10/24.       Thank you for involving us in the care of Sabas Jefferson.  Please feel  free to call with any questions.    Delano Mora MD  11/10/24  09:22 [facility]    [facility]  [phone]    Please note that portions of this note were completed with a voice recognition program.

## 2024-11-10 NOTE — PROGRESS NOTES
Nut[facility] Services    Patient Name:  Sabas Jefferson  YOB: 1940  MRN: [id number]  Admit Date:  11/7/2024    RN contacted RD about tube feeding orders. NG was able to be placed and tube feeding started @10ml/hr. Recommend to increase tube feeding very slowly as pt is very high risk for refeeding syndrome. RD updated order: fibersource HN @10ml/hr and increase 10ml q12 as tolerated, FWF 100ml/hr per MD. Monitor labs for refeeding.  Labs: Na 147, K 3.8, BUN 84, Cr 2.89, gluc 115-132, Mg 2.3, PO4 3.3    Electronically signed by:  Mary Lou Qureshi RD  11/10/24 10:27 EST

## 2024-11-10 NOTE — PROGRESS NOTES
"Dr. ARA Contreras    [facility] CARDIAC INTENSIVE CARE        Patient ID:  Name:  Sabas Jefferson  MRN:  [id number]  1940  84 y.o.  male            CC/Reason for visit: Hypernatremia and many other medical problems listed below     Interval hx: Opens eyes to verbal stimuli but does not answer any questions.  Does not follow any commands    ROS: Unobtainable, patient nonverbal    I reviewed old medical records.  Past medical history, social history and family history: Unchanged from admission H&P.      Vitals:  Vitals:    11/10/24 0600 11/10/24 0700 11/10/24 0721 11/10/24 0800   BP: (!) 86/66 96/58  103/63   Pulse: 97 96  95   Resp:       Temp:   97.8 °F (36.6 °C)    TempSrc:   Oral    SpO2: 100% 100%  100%   Weight:       Height:               Body mass index is 18.51 kg/m².    Intake/Output Summary (Last 24 hours) at 11/10/2024 0822  Last data filed at 11/10/2024 0500  Gross per 24 hour   Intake 4108 ml   Output 1355 ml   Net 2753 ml       Exam:  GEN:  No distress  Sleepy but opens eyes to verbal stimuli.  Does not answer any questions.  Does not follow commands  LUNGS: Diminished but clear breath sounds bilat, no use of accessory muscles  CV:  Normal S1S2, without murmur, no edema  ABD:  Non tender, no enlarged liver or masses      Scheduled meds:  cefepime, 2,000 mg, Intravenous, Q24H  heparin (porcine), 5,000 Units, Subcutaneous, Q8H  midodrine, 5 mg, Oral, TID AC  mupirocin, 1 Application, Each Nare, BID  senna-docusate sodium, 2 tablet, Oral, BID      IV meds:                      dextrose, 150 mL/hr, Last Rate: 150 mL/hr (11/10/24 0339)  norepinephrine, 0.02-0.3 mcg/kg/min, Last Rate: Stopped (11/09/24 1043)  vasopressin, 0.03 Units/min, Last Rate: Stopped (11/08/24 1952)        Data Review:   I reviewed the patient's medications and new clinical results.    No results found for: \"COVID19\"      Lab Results   Component Value Date    CALCIUM 7.2 (L) 11/10/2024    PHOS 3.3 11/10/2024    MG 2.3 " 11/10/2024    MG 2.5 (H) 11/09/2024    MG 2.9 (H) 11/08/2024     Results from last 7 days   Lab Units 11/10/24  0437 11/09/24 0313 11/08/24 2023 11/08/24  1343 11/08/24  0441 11/07/24  2312 11/07/24  1833   SODIUM mmol/L 147* 158* 165*   < >  --    < > 172*   POTASSIUM mmol/L 3.8 4.0 4.2   < >  --    < > 5.0   CHLORIDE mmol/L 119* 129* 133*   < >  --    < > 140*   CO2 mmol/L 16.0* 17.8* 19.0*   < >  --    < > 16.0*   BUN mg/dL 84* 112* 109*   < >  --    < > 119*   CREATININE mg/dL 2.89* 3.18* 3.32*   < >  --    < > 4.37*   CALCIUM mg/dL 7.2* 7.7* 7.4*   < >  --    < > 8.2*   BILIRUBIN mg/dL 0.3  --   --   --   --   --  0.3   ALK PHOS U/L 67  --   --   --   --   --  76   ALT (SGPT) U/L 14  --   --   --   --   --  11   AST (SGOT) U/L 21  --   --   --   --   --  12   GLUCOSE mg/dL 115* 177* 186*   < >  --    < > 252*   WBC 10*3/mm3 10.27 10.64  --   --  11.35*   < > 10.23   HEMOGLOBIN g/dL 7.8* 7.7*  --   --  8.4*   < > 8.5*   PLATELETS 10*3/mm3 69* 80*  --   --  81*   < > 83*    < > = values in this interval not displayed.     Results from last 7 days   Lab Units 11/07/24 2054 11/07/24 2043   BLOODCX   --  No growth at 2 days  No growth at 2 days   URINECX  50,000 CFU/mL Gram Positive Cocci*  --          Results from last 7 days   Lab Units 11/07/24 2043 11/07/24 1833   HSTROP T ng/L 165* 172*            ASSESSMENT:    Hypernatremia, severe, likely causing encephalopathy  Acute metabolic encephalopathy  Severe sepsis present on admission   Acute kidney injury  Chronic kidney disease  Urinary tract infection  Chronic indwelling urinary bladder Kim catheter  Bilateral ureteral stents, chronic  Thrombocytopenia  Anemia  Elevated troponin  Chronic left hemidiaphragm elevation  Diffuse brain atrophy  Dementia      PLAN:  Patient is still encephalopathic.  Not following any commands.  Does have dementia at baseline.  I am not sure what his cognitive function is at baseline.  Off of pressors.  Sodium levels much  improved.  Appreciate assistance from nephrology.  Creatinine also much improved.  Continue to monitor urine output closely.  Still thrombocytopenic and anemic.  Continue to check daily CBC but not requiring blood product transfusion at this time.  Continue to monitor temperature curve, white count and procalcitonin levels.  Currently empirically on cefepime.  Will stop tomorrow on day 3.  Urine culture likely contaminant.  Chronic indwelling urinary Kim catheter in place.  No clinical indication to remove this.  Acute kidney injury on chronic kidney disease is improving.  Fluids as per nephrology.  Continue to wean oxygen.  Likely to continue requiring low-flow, perhaps 1 L at all times since he has chronic left hemidiaphragm elevation.    Transfer out of ICU    We will sign off    I reviewed the chart and other providers notes and reviewed labs.  Copied text in this note has been reviewed and is accurate as of today      Gautam Contreras MD  11/10/2024

## 2024-11-10 NOTE — PLAN OF CARE
Goal Outcome Evaluation:  Plan of Care Reviewed With: patient, family        Progress: no change     Pt remains in CICU, no acute events over night VSS. Cortrack not flushing TF stopped will reassess in the AM. No BM, had 1000 cc of UOP. Will continue to monitor.

## 2024-11-10 NOTE — PLAN OF CARE
Goal Outcome Evaluation:      Pt is a 85 yo male who was admitted to the ICU on 11/07/2024 for hypernatremia, pt was transferred to the floor from the ICU on 11/10/2024.  Pt opens eyes, does not follow commands, and is non verbal has a GCS of 7, skin is pale, warm and dry, radial pulses and dorsal pedis pulses present, pt will withdraw from pain unable to assess if pt has any numbness or tingling, PERRL, - JVD, trachea midline, = rise and fall of chest wall with respirations, pt lung sounds diminished no other advantageous lung sounds noted, respiratory rate is tachypneic SPO2 at %, pt has a cortrak at 50cm bridled running at 10ml an hour with 100ml water flush every hour via pump.  Worked on education with the pts family, pt was included and care plan goals as able.  Safety check done every 2 hours.

## 2024-11-11 ENCOUNTER — APPOINTMENT (OUTPATIENT)
Dept: GENERAL RADIOLOGY | Facility: HOSPITAL | Age: 84
End: 2024-11-11
Payer: MEDICARE

## 2024-11-11 PROBLEM — G93.41 ACUTE METABOLIC ENCEPHALOPATHY: Status: ACTIVE | Noted: 2024-11-11

## 2024-11-11 PROBLEM — N39.0 UTI (URINARY TRACT INFECTION) DUE TO URINARY INDWELLING CATHETER: Status: ACTIVE | Noted: 2024-11-11

## 2024-11-11 PROBLEM — D69.6 THROMBOCYTOPENIA: Status: ACTIVE | Noted: 2024-11-11

## 2024-11-11 PROBLEM — N17.9 AKI (ACUTE KIDNEY INJURY): Status: ACTIVE | Noted: 2024-11-11

## 2024-11-11 PROBLEM — A41.9 SEVERE SEPSIS: Status: ACTIVE | Noted: 2024-11-11

## 2024-11-11 PROBLEM — R65.20 SEVERE SEPSIS: Status: ACTIVE | Noted: 2024-11-11

## 2024-11-11 PROBLEM — T83.511A UTI (URINARY TRACT INFECTION) DUE TO URINARY INDWELLING CATHETER: Status: ACTIVE | Noted: 2024-11-11

## 2024-11-11 PROBLEM — K63.1: Status: ACTIVE | Noted: 2024-11-11

## 2024-11-11 PROBLEM — D64.9 ANEMIA: Status: ACTIVE | Noted: 2024-11-11

## 2024-11-11 PROBLEM — R79.89 ELEVATED TROPONIN: Status: ACTIVE | Noted: 2024-11-11

## 2024-11-11 LAB
ALBUMIN SERPL-MCNC: 1.8 G/DL (ref 3.5–5.2)
ANION GAP SERPL CALCULATED.3IONS-SCNC: 13 MMOL/L (ref 5–15)
BACTERIA SPEC AEROBE CULT: ABNORMAL
BACTERIA SPEC AEROBE CULT: ABNORMAL
BASOPHILS # BLD AUTO: 0.02 10*3/MM3 (ref 0–0.2)
BASOPHILS NFR BLD AUTO: 0.2 % (ref 0–1.5)
BUN SERPL-MCNC: 76 MG/DL (ref 8–23)
BUN/CREAT SERPL: 30.5 (ref 7–25)
CALCIUM SPEC-SCNC: 7.6 MG/DL (ref 8.6–10.5)
CHLORIDE SERPL-SCNC: 110 MMOL/L (ref 98–107)
CO2 SERPL-SCNC: 15 MMOL/L (ref 22–29)
CREAT SERPL-MCNC: 2.49 MG/DL (ref 0.76–1.27)
DEPRECATED RDW RBC AUTO: 52.4 FL (ref 37–54)
EGFRCR SERPLBLD CKD-EPI 2021: 25.1 ML/MIN/1.73
EOSINOPHIL # BLD AUTO: 0.08 10*3/MM3 (ref 0–0.4)
EOSINOPHIL NFR BLD AUTO: 0.6 % (ref 0.3–6.2)
ERYTHROCYTE [DISTWIDTH] IN BLOOD BY AUTOMATED COUNT: 15.4 % (ref 12.3–15.4)
GLUCOSE BLDC GLUCOMTR-MCNC: 113 MG/DL (ref 70–130)
GLUCOSE BLDC GLUCOMTR-MCNC: 119 MG/DL (ref 70–130)
GLUCOSE SERPL-MCNC: 123 MG/DL (ref 65–99)
HCT VFR BLD AUTO: 24.9 % (ref 37.5–51)
HGB BLD-MCNC: 8.2 G/DL (ref 13–17.7)
IMM GRANULOCYTES # BLD AUTO: 0.45 10*3/MM3 (ref 0–0.05)
IMM GRANULOCYTES NFR BLD AUTO: 3.4 % (ref 0–0.5)
LYMPHOCYTES # BLD AUTO: 2.83 10*3/MM3 (ref 0.7–3.1)
LYMPHOCYTES NFR BLD AUTO: 21.7 % (ref 19.6–45.3)
MAGNESIUM SERPL-MCNC: 2.3 MG/DL (ref 1.6–2.4)
MCH RBC QN AUTO: 30.6 PG (ref 26.6–33)
MCHC RBC AUTO-ENTMCNC: 32.9 G/DL (ref 31.5–35.7)
MCV RBC AUTO: 92.9 FL (ref 79–97)
MONOCYTES # BLD AUTO: 0.78 10*3/MM3 (ref 0.1–0.9)
MONOCYTES NFR BLD AUTO: 6 % (ref 5–12)
NEUTROPHILS NFR BLD AUTO: 68.1 % (ref 42.7–76)
NEUTROPHILS NFR BLD AUTO: 8.9 10*3/MM3 (ref 1.7–7)
NRBC BLD AUTO-RTO: 0 /100 WBC (ref 0–0.2)
PHOSPHATE SERPL-MCNC: 3.7 MG/DL (ref 2.5–4.5)
PLATELET # BLD AUTO: 83 10*3/MM3 (ref 140–450)
PMV BLD AUTO: 12 FL (ref 6–12)
POTASSIUM SERPL-SCNC: 3.9 MMOL/L (ref 3.5–5.2)
RBC # BLD AUTO: 2.68 10*6/MM3 (ref 4.14–5.8)
SODIUM SERPL-SCNC: 138 MMOL/L (ref 136–145)
URATE SERPL-MCNC: 7.1 MG/DL (ref 3.4–7)
WBC NRBC COR # BLD AUTO: 13.06 10*3/MM3 (ref 3.4–10.8)

## 2024-11-11 PROCEDURE — 82948 REAGENT STRIP/BLOOD GLUCOSE: CPT

## 2024-11-11 PROCEDURE — 84550 ASSAY OF BLOOD/URIC ACID: CPT | Performed by: INTERNAL MEDICINE

## 2024-11-11 PROCEDURE — 83735 ASSAY OF MAGNESIUM: CPT | Performed by: INTERNAL MEDICINE

## 2024-11-11 PROCEDURE — 80069 RENAL FUNCTION PANEL: CPT | Performed by: INTERNAL MEDICINE

## 2024-11-11 PROCEDURE — 25010000002 HEPARIN (PORCINE) PER 1000 UNITS: Performed by: INTERNAL MEDICINE

## 2024-11-11 PROCEDURE — 74018 RADEX ABDOMEN 1 VIEW: CPT

## 2024-11-11 PROCEDURE — 85025 COMPLETE CBC W/AUTO DIFF WBC: CPT | Performed by: INTERNAL MEDICINE

## 2024-11-11 PROCEDURE — 0 DEXTROSE 5 % SOLUTION: Performed by: INTERNAL MEDICINE

## 2024-11-11 RX ORDER — GLYCOPYRROLATE 0.2 MG/ML
0.2 INJECTION INTRAMUSCULAR; INTRAVENOUS
Status: DISCONTINUED | OUTPATIENT
Start: 2024-11-11 | End: 2024-11-17

## 2024-11-11 RX ORDER — LORAZEPAM 2 MG/ML
0.5 INJECTION INTRAMUSCULAR
Status: ACTIVE | OUTPATIENT
Start: 2024-11-11 | End: 2024-11-16

## 2024-11-11 RX ORDER — LORAZEPAM 2 MG/ML
1 INJECTION INTRAMUSCULAR
Status: ACTIVE | OUTPATIENT
Start: 2024-11-11 | End: 2024-11-16

## 2024-11-11 RX ORDER — DIPHENOXYLATE HYDROCHLORIDE AND ATROPINE SULFATE 2.5; .025 MG/1; MG/1
1 TABLET ORAL
Status: DISCONTINUED | OUTPATIENT
Start: 2024-11-11 | End: 2024-11-17

## 2024-11-11 RX ORDER — ACETAMINOPHEN 325 MG/1
650 TABLET ORAL EVERY 4 HOURS PRN
Status: DISCONTINUED | OUTPATIENT
Start: 2024-11-11 | End: 2024-11-17

## 2024-11-11 RX ORDER — MORPHINE SULFATE 20 MG/ML
10 SOLUTION ORAL
Status: DISCONTINUED | OUTPATIENT
Start: 2024-11-11 | End: 2024-11-17

## 2024-11-11 RX ORDER — LORAZEPAM 2 MG/ML
2 INJECTION INTRAMUSCULAR
Status: ACTIVE | OUTPATIENT
Start: 2024-11-11 | End: 2024-11-16

## 2024-11-11 RX ORDER — ACETAMINOPHEN 160 MG/5ML
650 SOLUTION ORAL EVERY 4 HOURS PRN
Status: DISCONTINUED | OUTPATIENT
Start: 2024-11-11 | End: 2024-11-17

## 2024-11-11 RX ORDER — LORAZEPAM 2 MG/ML
2 CONCENTRATE ORAL
Status: ACTIVE | OUTPATIENT
Start: 2024-11-11 | End: 2024-11-16

## 2024-11-11 RX ORDER — MORPHINE SULFATE 20 MG/ML
20 SOLUTION ORAL
Status: DISCONTINUED | OUTPATIENT
Start: 2024-11-11 | End: 2024-11-17

## 2024-11-11 RX ORDER — MORPHINE SULFATE 2 MG/ML
2 INJECTION, SOLUTION INTRAMUSCULAR; INTRAVENOUS
Status: DISCONTINUED | OUTPATIENT
Start: 2024-11-11 | End: 2024-11-17

## 2024-11-11 RX ORDER — LORAZEPAM 2 MG/ML
0.5 CONCENTRATE ORAL
Status: ACTIVE | OUTPATIENT
Start: 2024-11-11 | End: 2024-11-16

## 2024-11-11 RX ORDER — LORAZEPAM 2 MG/ML
1 CONCENTRATE ORAL
Status: ACTIVE | OUTPATIENT
Start: 2024-11-11 | End: 2024-11-16

## 2024-11-11 RX ORDER — MORPHINE SULFATE 20 MG/ML
5 SOLUTION ORAL
Status: DISCONTINUED | OUTPATIENT
Start: 2024-11-11 | End: 2024-11-17

## 2024-11-11 RX ORDER — KETOROLAC TROMETHAMINE 30 MG/ML
15 INJECTION, SOLUTION INTRAMUSCULAR; INTRAVENOUS EVERY 6 HOURS PRN
Status: ACTIVE | OUTPATIENT
Start: 2024-11-11 | End: 2024-11-16

## 2024-11-11 RX ORDER — MORPHINE SULFATE 2 MG/ML
6 INJECTION, SOLUTION INTRAMUSCULAR; INTRAVENOUS
Status: DISCONTINUED | OUTPATIENT
Start: 2024-11-11 | End: 2024-11-17

## 2024-11-11 RX ORDER — HYDROMORPHONE HYDROCHLORIDE 1 MG/ML
0.5 INJECTION, SOLUTION INTRAMUSCULAR; INTRAVENOUS; SUBCUTANEOUS
Status: DISCONTINUED | OUTPATIENT
Start: 2024-11-11 | End: 2024-11-17

## 2024-11-11 RX ORDER — GLYCOPYRROLATE 0.2 MG/ML
0.4 INJECTION INTRAMUSCULAR; INTRAVENOUS
Status: DISCONTINUED | OUTPATIENT
Start: 2024-11-11 | End: 2024-11-17

## 2024-11-11 RX ORDER — DEXTROSE MONOHYDRATE 50 MG/ML
50 INJECTION, SOLUTION INTRAVENOUS CONTINUOUS
Status: DISCONTINUED | OUTPATIENT
Start: 2024-11-11 | End: 2024-11-11

## 2024-11-11 RX ORDER — ACETAMINOPHEN 650 MG/1
650 SUPPOSITORY RECTAL EVERY 4 HOURS PRN
Status: DISCONTINUED | OUTPATIENT
Start: 2024-11-11 | End: 2024-11-17

## 2024-11-11 RX ORDER — MORPHINE SULFATE 2 MG/ML
4 INJECTION, SOLUTION INTRAMUSCULAR; INTRAVENOUS
Status: DISCONTINUED | OUTPATIENT
Start: 2024-11-11 | End: 2024-11-17

## 2024-11-11 RX ADMIN — HEPARIN SODIUM 5000 UNITS: 5000 INJECTION INTRAVENOUS; SUBCUTANEOUS at 06:12

## 2024-11-11 RX ADMIN — MORPHINE SULFATE 5 MG: 20 SOLUTION ORAL at 17:16

## 2024-11-11 RX ADMIN — MIDODRINE HYDROCHLORIDE 5 MG: 5 TABLET ORAL at 17:10

## 2024-11-11 RX ADMIN — DEXTROSE MONOHYDRATE 50 ML/HR: 50 INJECTION, SOLUTION INTRAVENOUS at 09:41

## 2024-11-11 NOTE — PROGRESS NOTES
Nutrition Services    Patient Name: Sabas Jefferson  YOB: 1940  MRN: [id number]  Admission date: 11/7/2024    PROGRESS NOTE      Encounter Information: Checking in on TF tolerance. Pt removed NG. NG to be replaced. Restraints ordered. Palliative care consult placed.     Pt's nurse called and asked RD to place cortrak via verbal consult from MD.     RD visited pt at bedside and placed cortrak. Cortrak placed in gastric position at 74 cm and secure with bridle. Family alerted RD during placement that pt with hx of stomach surgery - family unsure of the specific surgery, possibly gastric bypass. KUB ordered to confirm placement.     KUB confirmed gastric placement. Pt made comfort measures. TF order discontinued.         PO Diet: NPO Diet NPO Type: Strict NPO   PO Supplements: -   PO Intake:  -       Current nutrition support: Fibersource HN @ 70 mL/hr with 100 mL/hr FWF    TF order discontinued   Nutrition support review: TF off r/t enteral access removed this morning.    Cortrak replaced but pt made comfort measures.        Labs (reviewed below): Reviewed, management per attending.         GI Function:  + BM on 11/6       Nutrition Intervention Updates: Pt made comfort measures. RD to sign off. Please re-consult RD if services needed.        Results from last 7 days   Lab Units 11/11/24  0549 11/10/24  0437 11/09/24  0313 11/07/24  2312 11/07/24  1833   SODIUM mmol/L 138 147* 158*   < > 172*   POTASSIUM mmol/L 3.9 3.8 4.0   < > 5.0   CHLORIDE mmol/L 110* 119* 129*   < > 140*   CO2 mmol/L 15.0* 16.0* 17.8*   < > 16.0*   BUN mg/dL 76* 84* 112*   < > 119*   CREATININE mg/dL 2.49* 2.89* 3.18*   < > 4.37*   CALCIUM mg/dL 7.6* 7.2* 7.7*   < > 8.2*   BILIRUBIN mg/dL  --  0.3  --   --  0.3   ALK PHOS U/L  --  67  --   --  76   ALT (SGPT) U/L  --  14  --   --  11   AST (SGOT) U/L  --  21  --   --  12   GLUCOSE mg/dL 123* 115* 177*   < > 252*    < > = values in this interval not displayed.     Results from last 7  "days   Lab Units 11/11/24  0549 11/10/24  0437 11/09/24  0313   MAGNESIUM mg/dL 2.3 2.3 2.5*   PHOSPHORUS mg/dL 3.7 3.3 3.4   HEMOGLOBIN g/dL 8.2* 7.8* 7.7*   HEMATOCRIT % 24.9* 24.1* 23.6*     No results found for: \"COVID19\"  No results found for: \"HGBA1C\"    Wt Readings from Last 10 Encounters:   11/11/24 0526 67.2 kg (148 lb 2.4 oz)   11/10/24 0538 58.5 kg (128 lb 15.5 oz)   11/09/24 0600 54.1 kg (119 lb 4.3 oz)   11/08/24 0450 54.1 kg (119 lb 4.3 oz)   11/07/24 2304 54 kg (119 lb 0.8 oz)   11/07/24 1801 54.6 kg (120 lb 5.9 oz)   06/21/16 0841 106 kg (233 lb 9.6 oz)   06/16/16 0711 105 kg (232 lb)       RD to follow up per protocol.    Electronically signed by:  Marci Flanagan RD  11/11/24 08:35 EST    "

## 2024-11-11 NOTE — PLAN OF CARE
Goal Outcome Evaluation:      Pt is a 85 yo male who was admitted to the ICU on 11/07/2024 for hypernatremia, pt was transferred to the floor from the ICU on 11/10/2024.  Pt opens eyes, does not follow commands, and is non verbal has a GCS of 9, skin is pale, warm and dry, radial pulses and dorsal pedis pulses present will note that pts right foot is cooler to touch, has a blister like anomaly on the top of it and the pulse is very weak and easy to occlude, pt will withdraw from pain unable to assess if pt has any numbness or tingling, PERRL, - JVD, trachea midline, = rise and fall of chest wall with respirations, pt lung sounds diminished no other advantageous lung sounds noted, respiratory rate is tachypneic SPO2 at %, pt had a new cortrak placed by dietary, and was placed in restraints at 0945, they were removed at 1130 when the pt was made a palliative care pt and order for cortrak was discontinued.  Restraints where discontinued after the cortrak was pulled.  Worked on education with the pts family, pt was included and care plan goals as able.  Safety check done every 2 hours.

## 2024-11-11 NOTE — CONSULTS
Purpose of the visit was to evaluate for: goals of care/advanced care planning and support for patient/family. Spoke with MD and RN as well as family and discussed palliative care, goals of care, care options, resuscitation status, and [facility].      Assessment:  Patient is palliative care appropriate for inpatient care given (list diagnosis/symptoms):  84 year old admitted with hypernatremia and severe sepsis, LEVON on CKD, history of dementia. Patient is lying in bed, no acute distress noted. He is nonverbal but does open his eyes to voice. Coretrak in place, artificial nutrition running and patient in restraints. Mobility significantly limited by chronic illness. PPS 30%    Cultural and spiritual needs have been assessed. No needs identified at this time.     Recommendations/Plan: Change code status to comfort measures only, gear all treatment options towards symptom management including removal of coretrak and discontinuation of IVF. Transfer to  and consult [facility].    Other Comments: Spoke with patient's spouse Alka and son Sabas Dash at bedside, along with daughter in law. We discussed goals of care, treatment options and code status in detail. Family has good understanding of situation. They are not aware of an advance directive or living will document. However they believe continuing artificial nutrition would not be in patient's best interest. They would prefer to optimize comfort and dignity and to allow a natural death.     I then called patient's daughter Janay and updated her on goals of care conversation. She was in agreement with transition to inpatient palliative care unit for comfort measures only.     Discussed with ZANDRA Rascon and DANIELA Tellez. Palliative care team will remain available for ongoing support.

## 2024-11-11 NOTE — PROGRESS NOTES
Name: Sabas Jefferson ADMIT: 2024   : 1940  PCP: Koby Holbrook MD    MRN: [id number] LOS: 4 days   AGE/SEX: 84 y.o. male  ROOM: [facility]     Subjective   Subjective   Patient resting in bed.  Palliative care team had just spoken with family prior to my arrival.  Wife, son and daughter-in-law at bedside and daughter was called by palliative care nurse who all agreed to transitioning to comfort care given his overall poor prognosis.  Patient himself is resting and arousable to voice, but not verbalizing at this time.  He does make eye contact.  Was notified by nursing staff prior to my evaluation that he was having some discoloration in his right foot that family stated was new.  Spoke with son at bedside who wishes to avoid aggressive workup given plans for comfort at this time.     Objective   Objective   Vital Signs  Temp:  [97.6 °F (36.4 °C)-99.1 °F (37.3 °C)] 97.6 °F (36.4 °C)  Heart Rate:  [94-97] 97  Resp:  [16-22] 22  BP: (93-99)/(52-61) 93/58  SpO2:  [100 %] 100 %  on   ;   Device (Oxygen Therapy): room air  Body mass index is 21.26 kg/m².    Physical Exam  Vitals and nursing note reviewed.   Constitutional:       Appearance: He is ill-appearing. He is not toxic-appearing.   Cardiovascular:      Rate and Rhythm: Normal rate and regular rhythm.      Pulses: Normal pulses.   Pulmonary:      Effort: Pulmonary effort is normal. No respiratory distress.      Comments: Diminished  Abdominal:      General: Bowel sounds are normal. There is no distension.      Palpations: Abdomen is soft.      Tenderness: There is no abdominal tenderness.   Musculoskeletal:         General: No swelling. Normal range of motion.   Skin:     General: Skin is warm and dry.      Comments: Darkened pigmentation to right foot, cool to touch. Thready pulse.   Neurological:      Mental Status: He is alert.      Comments: Nonverbal at this time. Making eye contact. Generally weak appearing.   Psychiatric:      Comments:  Calm, flat, somewhat withdrawn.       Results Review:       I reviewed the patient's new clinical results.  Results from last 7 days   Lab Units 11/11/24  0549 11/10/24  0437 11/09/24  0313 11/08/24  0441   WBC 10*3/mm3 13.06* 10.27 10.64 11.35*   HEMOGLOBIN g/dL 8.2* 7.8* 7.7* 8.4*   PLATELETS 10*3/mm3 83* 69* 80* 81*     Results from last 7 days   Lab Units 11/11/24  0549 11/10/24  0437 11/09/24  0313 11/08/24  2023   SODIUM mmol/L 138 147* 158* 165*   POTASSIUM mmol/L 3.9 3.8 4.0 4.2   CHLORIDE mmol/L 110* 119* 129* 133*   CO2 mmol/L 15.0* 16.0* 17.8* 19.0*   BUN mg/dL 76* 84* 112* 109*   CREATININE mg/dL 2.49* 2.89* 3.18* 3.32*   GLUCOSE mg/dL 123* 115* 177* 186*   Estimated Creatinine Clearance: 21.7 mL/min (A) (by C-G formula based on SCr of 2.49 mg/dL (H)).  Results from last 7 days   Lab Units 11/11/24  0549 11/10/24  0437 11/09/24  0313 11/07/24  1833   ALBUMIN g/dL 1.8* 1.9* 2.2* 2.3*   BILIRUBIN mg/dL  --  0.3  --  0.3   ALK PHOS U/L  --  67  --  76   AST (SGOT) U/L  --  21  --  12   ALT (SGPT) U/L  --  14  --  11     Results from last 7 days   Lab Units 11/11/24  0549 11/10/24  0437 11/09/24  0313 11/08/24  2023 11/08/24  1343 11/08/24  0357 11/07/24  2312 11/07/24  1833   CALCIUM mg/dL 7.6* 7.2* 7.7* 7.4*   < > 7.5*   < > 8.2*   ALBUMIN g/dL 1.8* 1.9* 2.2*  --   --   --   --  2.3*   MAGNESIUM mg/dL 2.3 2.3 2.5*  --   --  2.9*  --   --    PHOSPHORUS mg/dL 3.7 3.3 3.4  --   --  3.5  --   --     < > = values in this interval not displayed.     Results from last 7 days   Lab Units 11/08/24  0209 11/07/24  2209 11/07/24  1833   LACTATE mmol/L 1.4 2.3* 4.2*     Glucose   Date/Time Value Ref Range Status   11/11/2024 1209 113 70 - 130 mg/dL Final   11/11/2024 0625 119 70 - 130 mg/dL Final   11/10/2024 1610 110 70 - 130 mg/dL Final   11/10/2024 0010 122 70 - 130 mg/dL Final   11/09/2024 1701 132 (H) 70 - 130 mg/dL Final   11/09/2024 1210 184 (H) 70 - 130 mg/dL Final   11/09/2024 0809 172 (H) 70 - 130 mg/dL  Final       midodrine, 5 mg, Oral, TID AC         No diet orders on file       Assessment/Plan     Active Hospital Problems    Diagnosis  POA    **Hypernatremia [E87.0]  Yes    LEVON (acute kidney injury) [N17.9]  Yes    Acute metabolic encephalopathy [G93.41]  Yes    UTI (urinary tract infection) due to urinary indwelling catheter [T83.511A, N39.0]  Yes    Severe sepsis [A41.9, R65.20]  Yes    Elevated troponin [R79.89]  Yes    Anemia [D64.9]  Yes    Thrombocytopenia [D69.6]  Yes    Perforation of rectum [K63.1]  Unknown    Dementia with behavioral disturbance [F03.918]  Yes    Severe protein-calorie malnutrition [E43]  Yes    Chronic kidney disease, 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     Mr. Jefferson is a 84 y.o. male with a history of Alzheimer's dementia, CKD, chronic Kim catheter and hypertension that presented to the hospital with altered mental status.  He eaten for the last 2 days and was not acting like himself.  He was mostly bedridden at baseline and was found to be altered with low blood pressure on arrival.  He was reported to also be nonverbal with severe dementia.  He was hyponatremic with a sodium of 172 and given IV fluids for hypotension and concerns for sepsis.  He was admitted to the ICU and nephrology was consulted.  He was found to have severe sepsis secondary to UTI in the setting of chronic indwelling catheter.  He was placed on empiric antibiotics.  CT A/P was obtained showing a rectal probe going through the anterior wall of the rectum and located over the left presacral space.  General surgery was consulted and the rectal probe was removed.  He was weaned off prepped surgeries and his sodium levels improved.  He was cleared to move out of the ICU on 11/10 and [facility] was asked to assume care.    Severe hypernatremia  LEVON on CKD2  -Found to have LEVON secondary to previous renal dysemia and severe  dehydration.  -Creatinine improving to 2.49 and sodium 138.   -Nephrology managing.  -Core track in place for free water flushes, but pulled out core track this morning.  Restraints were required and core track was replaced.    Severe sepsis  UTI due to chronic indwelling catheter  -Treated with 3 days of Cefepime.   -Urine culture grew 50,000 Enterococcus faecium, VRE and Steptococcus ovis-> felt contaminant by pulmonology.  -Blood cultures NGTD.    Acute metabolic encephalopathy  Dementia with behavioral disturbance  -Mostly bedridden and nonverbal per admitting notes.  -No real improvement in mental status.  -Required restraints this morning for pulling lines.   -Palliative care consulted.   -Poor candidate for PEG with underlying dementia.     Perforation of rectum  -Iatrogenic in nature. Probe removed by surgery.  -General surgery evaluated.  -Plans to monitor for rectal abscess though chances were felt to be low.    Anemia  Thrombocytopenia  -Up-trending on labs today.   -Plans to monitor and transfuse as needed.    HTN with hypotension this admission  -On midodrine and previously pressors.    Discussed with patient, family at bedside (wife/son/daughter-in-law), nurse as well as Brandy with palliative care team who confirmed plan with daughter Janay by phone.    New patient to [facility] today. Reviewed notes and overall poor prognosis. Appreciate palliative care discussion. Dr. Contreras ordered their evaluation 11/9. Family in agreement for comfort care prior to my arrival. This was confirmed with son at bedside. Will change to DNR/comfort measures and transfer to palliative floor for end-of-life care. Hospice will be consulted as well.      ZANDRA Rondon  [facility]  11/11/24  13:01 EST

## 2024-11-11 NOTE — SIGNIFICANT NOTE
#Dementia    - Patient pulled out NG tube, unable to get feeds or medications    - Restraints ordered to prevent patient from interfering with care    - NG to be replaced    - palliative care consult placed earlier    Electronically signed by Kings Garcia DO, 11/11/24, 7:51 AM EST.

## 2024-11-11 NOTE — CONSULTS
[facility] Visit Report    Sabas Jefferson  [id number]  11/11/2024    [facility] consult received and records reviewed with [facility] medical officer Dr Lana Benjamin. Patient is medically eligible for services at discharge--not HSB eligible at this time.     Met with patients family including spouse Alka and children Torrie Obregon and Janay. Detailed explanation of services provided including HSB admission. Family wishes are for patient to transfer to [facility] for CMO.     [facility] will continue to follow up for HSB eligbility -v- discharge plan. Please call with any updates or change in care needs.    Thank you for allowing [facility] to participate with this patient's and family care needs.    Karla Mckinnon, DANIELA   [facility] Admissions Coordinator  [phone]

## 2024-11-11 NOTE — PLAN OF CARE
Goal Outcome Evaluation:            Patient rested comfortably all shift. Tolerating tube feeds at 20cc/hr, increase to 30cc/hr due at 0830 this am.

## 2024-11-11 NOTE — PROGRESS NOTES
Discharge Planning Assessment  [facility]     Patient Name: Sabas Jefferson  MRN: [id number]  Today's Date: 11/11/2024    Admit Date: 11/7/2024    Plan: PALLIATIVE   Discharge Needs Assessment    No documentation.                  Discharge Plan       Row Name 11/11/24 1653       Plan    Plan PALLIATIVE    Plan Comments The patient transferred to [facility] from [address] [place] [address] on 11/11/24. The patient is palliative. [facility] spoke with the family and the patient is not appropriate for HSB at this time. Tube feeds stopped today. [facility] will continue to follow and admit when appropriate. BRIA Bethea RN CCP                  Continued Care and Services - Admitted Since 11/7/2024    No active coordination exists for this encounter.       Expected Discharge Date and Time       Expected Discharge Date Expected Discharge Time    Nov 16, 2024            Demographic Summary    No documentation.                  Functional Status    No documentation.                  Psychosocial    No documentation.                  Abuse/Neglect    No documentation.                  Legal    No documentation.                  Substance Abuse    No documentation.                  Patient Forms    No documentation.                     Beata Bethea RN

## 2024-11-11 NOTE — PLAN OF CARE
"Goal Outcome Evaluation:         Patient transfer from [facility] this afternoon with family at bedside. Contact isolation VRE urine. PPS 30%. Diet as tolerated- was able to eat applesauce and chicken broth. Patient is nonverbal but responds to pain and can shake head Y/N. Morphine 5 SL given- family wishes to start meds as low as possible. Discussed patient may only need medications as needed and possibly before positioning. Will cont comfort care.       Patient goes by \"Sy\"                                   "

## 2024-11-11 NOTE — PROGRESS NOTES
[facility] Progress Note      Patient Name: Sabas Jefferson  : 1940  MRN: [id number]  Primary Care Physician:  Koby Holbrook MD  Date of admission: 2024    Subjective     Interval History:   F/u LEVON     Review of Systems:   UOP 2575  SBP 90s  Pulled out DHT  Remains nonverbal     Objective     Vitals:   Temp:  [97.8 °F (36.6 °C)-99.1 °F (37.3 °C)] 98.8 °F (37.1 °C)  Heart Rate:  [89-97] 96  Resp:  [16-22] 16  BP: ()/(55-63) 94/57    Intake/Output Summary (Last 24 hours) at 2024 0638  Last data filed at 2024 0502  Gross per 24 hour   Intake 670 ml   Output 2575 ml   Net -1905 ml       Physical Exam:    General Appearance: awake, confused, blankly stares, no distress  Neck: supple, no JVD  Lungs: CTA bilat  Heart: RRR, normal S1 and S2  Abdomen: soft, nontender, nondistended  Extremities: no edema, cyanosis or clubbing       Scheduled Meds:     heparin (porcine), 5,000 Units, Subcutaneous, Q8H  midodrine, 5 mg, Oral, TID AC  mupirocin, 1 Application, Each Nare, BID  senna-docusate sodium, 2 tablet, Oral, BID      IV Meds:   dextrose, 50 mL/hr, Last Rate: 50 mL/hr (11/10/24 0939)        Results Reviewed:   I have personally reviewed the results from the time of this admission to 2024 06:38 EST     Results from last 7 days   Lab Units 11/10/24  0437 24  0313 24  2312 24  1833   SODIUM mmol/L 147* 158* 165*   < > 172*   POTASSIUM mmol/L 3.8 4.0 4.2   < > 5.0   CHLORIDE mmol/L 119* 129* 133*   < > 140*   CO2 mmol/L 16.0* 17.8* 19.0*   < > 16.0*   BUN mg/dL 84* 112* 109*   < > 119*   CREATININE mg/dL 2.89* 3.18* 3.32*   < > 4.37*   CALCIUM mg/dL 7.2* 7.7* 7.4*   < > 8.2*   BILIRUBIN mg/dL 0.3  --   --   --  0.3   ALK PHOS U/L 67  --   --   --  76   ALT (SGPT) U/L 14  --   --   --  11   AST (SGOT) U/L 21  --   --   --  12   GLUCOSE mg/dL 115* 177* 186*   < > 252*    < > = values in this interval not displayed.      Estimated Creatinine Clearance: 18.7 mL/min (A) (by C-G formula based on SCr of 2.89 mg/dL (H)).  Results from last 7 days   Lab Units 11/10/24  0437 11/09/24 0313 11/08/24  0357   MAGNESIUM mg/dL 2.3 2.5* 2.9*   PHOSPHORUS mg/dL 3.3 3.4 3.5     Results from last 7 days   Lab Units 11/10/24  0437   URIC ACID mg/dL 8.0*     Results from last 7 days   Lab Units 11/11/24  0549 11/10/24  0437 11/09/24  0313 11/08/24  0441 11/07/24  2312   WBC 10*3/mm3 13.06* 10.27 10.64 11.35* 8.87   HEMOGLOBIN g/dL 8.2* 7.8* 7.7* 8.4* 7.9*   PLATELETS 10*3/mm3 83* 69* 80* 81* 77*           Assessment / Plan     ASSESSMENT:  Non olig LEVON secondary to prerenal azotemia and severe dehydration - improving, Cr down to 2.9 yesterday, labs pending today but UOP robust 2.5L/24h   Severe hypernatremia related to dehydration, sodium was down to 147 yesterday.  Pending today, but pulled out DHT so no water flushes - will continue light D5W instead  UTI with chronic indwelling Kim catheter treated  Septic shock - improved, off pressors, SBP 90s, was getting midodrine (but no route for this currently)  History of nephrolithiasis and bilateral ureteral stent in place  Dementia - baseline cognition unclear   Anemia and thrombocytopenia, hemoglobin is up slightly 8.2 and PLT stable 83K  Dysphagia - pulled out DHT    PLAN:  Follow up labs   Continue D5W 50 cc/hr  Agree with palliative care eval for goals of care - appears poor candidate for PEG given dementia   D/W RN      Koby Swain MD  11/11/24  06:38 [facility]    [facility]  [phone]

## 2024-11-12 LAB
BACTERIA SPEC AEROBE CULT: NORMAL
BACTERIA SPEC AEROBE CULT: NORMAL

## 2024-11-12 RX ADMIN — ACETAMINOPHEN 325MG 650 MG: 325 TABLET ORAL at 12:13

## 2024-11-12 RX ADMIN — MORPHINE SULFATE 5 MG: 20 SOLUTION ORAL at 02:39

## 2024-11-12 RX ADMIN — MIDODRINE HYDROCHLORIDE 5 MG: 5 TABLET ORAL at 12:14

## 2024-11-12 NOTE — PROGRESS NOTES
Name: Sabas Jefferson ADMIT: 2024   : 1940  PCP: Koby Holbrook MD    MRN: [id number] LOS: 5 days   AGE/SEX: 84 y.o. male  ROOM: [facility]/     Subjective   Subjective   Patient resting in bed.  Not really verbal with me this morning.      Objective   Objective   Vital Signs  Temp:  [97.6 °F (36.4 °C)-97.9 °F (36.6 °C)] 97.9 °F (36.6 °C)  Heart Rate:  [] 101  Resp:  [18] 18  BP: (89-93)/(59-64) 93/64  SpO2:  [100 %] 100 %  on   ;   Device (Oxygen Therapy): room air  Body mass index is 21.26 kg/m².    Physical Exam  Constitutional:       Appearance: He is ill-appearing. He is not toxic-appearing.   Cardiovascular:      Rate and Rhythm: Normal rate and regular rhythm.      Pulses: Normal pulses.   Pulmonary:      Effort: Pulmonary effort is normal. No respiratory distress.      Comments: Diminished  Abdominal:      General: Bowel sounds are normal. There is no distension.      Palpations: Abdomen is soft.      Tenderness: There is no abdominal tenderness.   Musculoskeletal:         General: No swelling. Normal range of motion.   Skin:     General: Skin is warm and dry.      Comments: Darkened pigmentation to right foot, cool to touch. Thready pulse.   Neurological:      Comments: Nonverbal at this time. Making eye contact. Generally weak appearing.   Psychiatric:      Comments: Calm, flat, somewhat withdrawn.            Diet: Regular/House; Texture: Mechanical Ground (NDD 2); Fluid Consistency: Thin (IDDSI 0)       Assessment/Plan     Active Hospital Problems    Diagnosis  POA    **Hypernatremia [E87.0]  Yes    LEVON (acute kidney injury) [N17.9]  Yes    Acute metabolic encephalopathy [G93.41]  Yes    UTI (urinary tract infection) due to urinary indwelling catheter [T83.511A, N39.0]  Yes    Severe sepsis [A41.9, R65.20]  Yes    Elevated troponin [R79.89]  Yes    Anemia [D64.9]  Yes    Thrombocytopenia [D69.6]  Yes    Perforation of rectum [K63.1]  Unknown    Dementia with behavioral disturbance  [F03.918]  Yes    Severe protein-calorie malnutrition [E43]  Yes    Chronic kidney disease, 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     Mr. Jefferson is a 84 y.o. male with a history of Alzheimer's dementia, CKD, chronic Kim catheter and hypertension that presented to the hospital with altered mental status.  He eaten for the last 2 days and was not acting like himself.  He was mostly bedridden at baseline and was found to be altered with low blood pressure on arrival.  He was reported to also be nonverbal with severe dementia.  He was hyponatremic with a sodium of 172 and given IV fluids for hypotension and concerns for sepsis.  He was admitted to the ICU and nephrology was consulted.  He was found to have severe sepsis secondary to UTI in the setting of chronic indwelling catheter.  He was placed on empiric antibiotics.  CT A/P was obtained showing a rectal probe going through the anterior wall of the rectum and located over the left presacral space.  General surgery was consulted and the rectal probe was removed.  He was weaned off prepped surgeries and his sodium levels improved.  He was cleared to move out of the ICU on 11/10 and [facility] was asked to assume care.    Severe hypernatremia  LEVON on CKD2  -Found to have LEVON secondary to previous renal dysemia and severe dehydration.    Severe sepsis  UTI due to chronic indwelling catheter  -Treated with 3 days of Cefepime.   -Urine culture grew 50,000 Enterococcus faecium, VRE and Steptococcus ovis-> felt contaminant by pulmonology.    Acute metabolic encephalopathy  Dementia with behavioral disturbance  -Mostly bedridden and nonverbal per admitting notes.  -No real improvement in mental status.  -Required restraints this morning for pulling lines.   -Palliative care consulted.   -Poor candidate for PEG with underlying dementia.     Perforation of rectum  -Iatrogenic in nature. Probe removed  by surgery.  -General surgery evaluated.    Anemia  Thrombocytopenia  HTN with hypotension this admission  -On midodrine and previously pressors.    Palliative care was consulted and the goals of care were transitioned to comfort measures on 11/11/2024. Hospice consulted as well.     ZANDRA Rondon  [facility]  11/12/24  13:49 EST

## 2024-11-12 NOTE — PLAN OF CARE
Goal Outcome Evaluation:  Plan of Care Reviewed With: patient        Progress: no change  Outcome Evaluation: Pt is alert, very lethargic throughout shift. x1 tylenol given. He rested comfortably after his bathroom. Spouse here this evening trying to get him to eat but she said she couldn't get him to wake up enough. Will cont plan of care.

## 2024-11-12 NOTE — PROGRESS NOTES
Discharge Planning Assessment  [facility]     Patient Name: Sabas Jefferson  MRN: [id number]  Today's Date: 11/12/2024    Admit Date: 11/7/2024    Plan: PALLIATIVE   Discharge Needs Assessment    No documentation.                  Discharge Plan       Row Name 11/12/24 6758       Plan    Plan Comments [facility] will continue to follow and will admit when appropriate. BRIA Bethea RN, [facility]                  Continued Care and Services - Admitted Since 11/7/2024    No active coordination exists for this encounter.       Expected Discharge Date and Time       Expected Discharge Date Expected Discharge Time    Nov 16, 2024            Demographic Summary    No documentation.                  Functional Status    No documentation.                  Psychosocial    No documentation.                  Abuse/Neglect    No documentation.                  Legal    No documentation.                  Substance Abuse    No documentation.                  Patient Forms    No documentation.                     Beata Bethea, RN

## 2024-11-12 NOTE — PLAN OF CARE
Goal Outcome Evaluation:  Plan of Care Reviewed With: patient        Progress: no change  Outcome Evaluation: Pt calm and nonverbal.  Medicated with 5mg SL morphine x1 for pain.  Family member visited for a while.  Pt appears comfortable at this time.  Will continue with plan of care.

## 2024-11-13 RX ADMIN — ACETAMINOPHEN 325MG 650 MG: 325 TABLET ORAL at 09:16

## 2024-11-13 RX ADMIN — MORPHINE SULFATE 5 MG: 20 SOLUTION ORAL at 10:01

## 2024-11-13 RX ADMIN — MIDODRINE HYDROCHLORIDE 5 MG: 5 TABLET ORAL at 17:47

## 2024-11-13 RX ADMIN — MIDODRINE HYDROCHLORIDE 5 MG: 5 TABLET ORAL at 09:16

## 2024-11-13 RX ADMIN — MIDODRINE HYDROCHLORIDE 5 MG: 5 TABLET ORAL at 11:30

## 2024-11-13 NOTE — PLAN OF CARE
Goal Outcome Evaluation:  Plan of Care Reviewed With: patient        Progress: no change  Outcome Evaluation: Appears comfortable. Sleeping between care. Turns every for hours. WIll monitor for needs.

## 2024-11-13 NOTE — PLAN OF CARE
Goal Outcome Evaluation:     Patient has a PPS of 30%.  Responsive to voice and touch.  Unable to voice needs and wants, yet he is able to nod yes and no to questions when asked.  Lethargic and fatigue present.  Appetite is poorExhibits s/s of pain with movement and when at rest.  As needed sublingual Morphine given x 1 this day and was effective.

## 2024-11-14 RX ADMIN — MIDODRINE HYDROCHLORIDE 5 MG: 5 TABLET ORAL at 10:15

## 2024-11-14 RX ADMIN — MIDODRINE HYDROCHLORIDE 5 MG: 5 TABLET ORAL at 18:42

## 2024-11-14 RX ADMIN — MIDODRINE HYDROCHLORIDE 5 MG: 5 TABLET ORAL at 11:30

## 2024-11-14 RX ADMIN — ACETAMINOPHEN 325MG 650 MG: 325 TABLET ORAL at 10:15

## 2024-11-14 NOTE — PROGRESS NOTES
Name: Sabas Jefferson ADMIT: 2024   : 1940  PCP: Koby Holbrook MD    MRN: [id number] LOS: 7 days   AGE/SEX: 84 y.o. male  ROOM: [facility]/     Subjective   Subjective     No events overnight. No family at bedside today. Discussed with his RN and he's eating about 30% of his meals. He interacted with me today and nodded his head to several of my questions and held my hand, but was non-verbal and I didn't feel like he understood me.       Objective   Objective   Vital Signs  Temp:  [96.7 °F (35.9 °C)] 96.7 °F (35.9 °C)  Heart Rate:  [101-102] 101  Resp:  [18] 18  BP: (94-97)/(60-68) 94/60  SpO2:  [94 %-98 %] 94 %  on   ;   Device (Oxygen Therapy): room air  Body mass index is 21.26 kg/m².  Physical Exam  Constitutional:       General: He is not in acute distress.     Appearance: He is not toxic-appearing.   Cardiovascular:      Rate and Rhythm: Normal rate and regular rhythm.      Heart sounds: Normal heart sounds.   Pulmonary:      Effort: Pulmonary effort is normal.      Breath sounds: Normal breath sounds.   Abdominal:      General: Bowel sounds are normal.      Palpations: Abdomen is soft.   Musculoskeletal:         General: No tenderness.      Right lower leg: Edema present.      Left lower leg: Edema present.   Neurological:      General: No focal deficit present.      Mental Status: He is alert. Mental status is at baseline. He is disoriented.   Psychiatric:         Mood and Affect: Mood normal.         Behavior: Behavior normal.         Results Review     I reviewed the patient's new clinical results.  Results from last 7 days   Lab Units 24  0549 11/10/24  0437 11/09/24  0313 11/08/24  0441   WBC 10*3/mm3 13.06* 10.27 10.64 11.35*   HEMOGLOBIN g/dL 8.2* 7.8* 7.7* 8.4*   PLATELETS 10*3/mm3 83* 69* 80* 81*     Results from last 7 days   Lab Units 24  0549 11/10/24  0437 24   SODIUM mmol/L 138 147* 158* 165*   POTASSIUM mmol/L 3.9 3.8 4.0 4.2   CHLORIDE  "mmol/L 110* 119* 129* 133*   CO2 mmol/L 15.0* 16.0* 17.8* 19.0*   BUN mg/dL 76* 84* 112* 109*   CREATININE mg/dL 2.49* 2.89* 3.18* 3.32*   GLUCOSE mg/dL 123* 115* 177* 186*   Estimated Creatinine Clearance: 21.7 mL/min (A) (by C-G formula based on SCr of 2.49 mg/dL (H)).  Results from last 7 days   Lab Units 11/11/24  0549 11/10/24  0437 11/09/24 0313 11/07/24  1833   ALBUMIN g/dL 1.8* 1.9* 2.2* 2.3*   BILIRUBIN mg/dL  --  0.3  --  0.3   ALK PHOS U/L  --  67  --  76   AST (SGOT) U/L  --  21  --  12   ALT (SGPT) U/L  --  14  --  11     Results from last 7 days   Lab Units 11/11/24  0549 11/10/24  0437 11/09/24 0313 11/08/24 2023 11/08/24  1343 11/08/24  0357 11/07/24  2312 11/07/24  1833   CALCIUM mg/dL 7.6* 7.2* 7.7* 7.4*   < > 7.5*   < > 8.2*   ALBUMIN g/dL 1.8* 1.9* 2.2*  --   --   --   --  2.3*   MAGNESIUM mg/dL 2.3 2.3 2.5*  --   --  2.9*  --   --    PHOSPHORUS mg/dL 3.7 3.3 3.4  --   --  3.5  --   --     < > = values in this interval not displayed.     Results from last 7 days   Lab Units 11/08/24  0209 11/07/24 2209 11/07/24  1833   LACTATE mmol/L 1.4 2.3* 4.2*   No results found for: \"COVID19\"  No results found for: \"HGBA1C\", \"POCGLU\"      XR Abdomen KUB  Narrative: XR ABDOMEN KUB-     DATE OF EXAM: 11/11/2024 10:14 AM     INDICATION: verify tube location.     COMPARISON: Radiographs 11/10/2024, 11/9/2024, and 11/8/2024. CT [address].     TECHNIQUE: A single portable supine AP view of the abdomen was obtained.     FINDINGS:  The lower abdomen, far left lateral hemiabdomen, and pelvis are not  included in the field-of-view. Patient rotation. Overlying artifacts.  Enteric tube looped in the left upper quadrant in the region of the  gastric fundus with the tip terminating in the right upper quadrant,  likely at the pylorus or duodenal bulb. Mildly air distended small  enlarged bowel loops. No significantly dilated loops of bowel. No  evidence of pneumatosis. Partially imaged bilateral ureteral stents " and  bilateral nephrolithiasis. Elevation of the left hemidiaphragm.     Impression: Limited study demonstrating an enteric tube looped in the left upper  quadrant in the region of the gastric fundus with the tip terminating in  the right upper quadrant in the region of the pylorus/duodenal bulb.  Could confirm position with CT if clinically indicated.     This report was finalized on 11/11/2024 10:53 AM by Hamlet Celaya MD on  Workstation: [id number]       Scheduled Medications  midodrine, 5 mg, Oral, TID AC    Infusions   Diet  Diet: Regular/House; Texture: Mechanical Ground (NDD 2); Fluid Consistency: Thin (IDDSI 0)       Assessment/Plan     Active Hospital Problems    Diagnosis  POA    **Hypernatremia [E87.0]  Yes    ELVON (acute kidney injury) [N17.9]  Yes    Acute metabolic encephalopathy [G93.41]  Yes    UTI (urinary tract infection) due to urinary indwelling catheter [T83.511A, N39.0]  Yes    Severe sepsis [A41.9, R65.20]  Yes    Elevated troponin [R79.89]  Yes    Anemia [D64.9]  Yes    Thrombocytopenia [D69.6]  Yes    Perforation of rectum [K63.1]  Unknown    Dementia with behavioral disturbance [F03.918]  Yes    Severe protein-calorie malnutrition [E43]  Yes    Chronic kidney disease, 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       84 y.o. male admitted with Hypernatremia.    Mr. Jefferson is a 84 y.o. male with a history of Alzheimer's dementia, CKD, chronic Kim catheter and hypertension that presented to the hospital with altered mental status.  He hadn't eaten for the last 2 days and was not acting like himself.  He was mostly bedridden at baseline and was found to be altered with low blood pressure on arrival.  He was reported to also be nonverbal with severe dementia.  He was hypernatremic with a sodium of 172 and given IV fluids for hypotension and concerns for sepsis.  He was admitted to the ICU and nephrology was  consulted.  He was found to have severe sepsis secondary to UTI in the setting of chronic indwelling catheter.  He was placed on empiric antibiotics.  CT A/P was obtained showing a rectal probe going through the anterior wall of the rectum and located over the left presacral space.  General surgery was consulted and the rectal probe was removed.  He was weaned off pressors and his sodium levels improved.  He was cleared to move out of the ICU on 11/10 and [facility] was asked to assume care. Ultimately, it was felt that the patient would require a feeding tube, but the family didn't feel this was consistent with the patient's wishes and so he was transitioned to comfort care. All therapies not for comfort except midodrine have been discontinued and the palliative care order set was initiated on 11/11/24.     Currently only requiring infrequent doses of oral morphine and tylenol. Eating 30% of his meals today.     Severe hypernatremia  LEVON on CKD2  -prerenal levon due to dehydration  -LEVON was improving with IVF on last check  -hypernatremia had resolved with D5w     Concern for UTI due to chronic indwelling catheter causing septic shock  -Treated with 3 days of Cefepime.   -Urine culture grew 50,000 Enterococcus faecium, VRE and Steptococcus ovis-> felt contaminant by pulmonology.  -his shock was more likely hypovolemic in nature since no infection was identified      Acute metabolic encephalopathy  Dementia with behavioral disturbance  -Mostly bedridden and nonverbal per admitting notes.  -required restraints for a while, but is now out of these   -Poor candidate for PEG with underlying dementia  -appears to be at baseline     Extraperitoneal perforation of the anterior wall of the rectum  -Iatrogenic in nature. Probe removed by surgery.  -General surgery evaluated.     Anemia  -stable on last check at 8.2    Thrombocytopenia  -stable on last check at 83    HTN with hypotension this admission  -On midodrine and previously  pressors.    DVT PPX: not consistent with goals of care  Code: comfort measures  Discussed with patient, nursing staff, and CCP.  Anticipate discharge  HSB vs. SNF with hospice  once arrangements have been made.      Marko Tam MD  [facility]  11/14/24  17:08 EST

## 2024-11-14 NOTE — PROGRESS NOTES
Name: Sabas Jefferson ADMIT: 2024   : 1940  PCP: Koby Holbrook MD    MRN: [id number] LOS: 6 days   AGE/SEX: 84 y.o. male  ROOM: [facility]/     Subjective   Subjective     No events overnight. No complaints family at bedside. The patient is awake and tracks me but doesn't respond to any of my questions or follow commands       Objective   Objective   Vital Signs  Temp:  [96.6 °F (35.9 °C)-97.1 °F (36.2 °C)] 96.6 °F (35.9 °C)  Heart Rate:  [] 95  Resp:  [16-18] 16  BP: ()/(68-72) 98/68  SpO2:  [98 %-99 %] 99 %  on   ;   Device (Oxygen Therapy): room air  Body mass index is 21.26 kg/m².  Physical Exam  Constitutional:       General: He is not in acute distress.     Appearance: He is not toxic-appearing.   Cardiovascular:      Rate and Rhythm: Normal rate and regular rhythm.      Heart sounds: Normal heart sounds.   Pulmonary:      Effort: Pulmonary effort is normal.      Breath sounds: Normal breath sounds.   Abdominal:      General: Bowel sounds are normal.      Palpations: Abdomen is soft.   Musculoskeletal:         General: No tenderness.      Right lower leg: Edema present.      Left lower leg: Edema present.   Neurological:      Mental Status: He is alert.   Psychiatric:         Mood and Affect: Mood normal.         Behavior: Behavior normal.         Results Review     I reviewed the patient's new clinical results.  Results from last 7 days   Lab Units 2449 11/10/24  0437 11/09/24  0313 11/08/24  0441   WBC 10*3/mm3 13.06* 10.27 10.64 11.35*   HEMOGLOBIN g/dL 8.2* 7.8* 7.7* 8.4*   PLATELETS 10*3/mm3 83* 69* 80* 81*     Results from last 7 days   Lab Units 2449 11/10/24  0437 11/09/24  0313 11/08/24  2023   SODIUM mmol/L 138 147* 158* 165*   POTASSIUM mmol/L 3.9 3.8 4.0 4.2   CHLORIDE mmol/L 110* 119* 129* 133*   CO2 mmol/L 15.0* 16.0* 17.8* 19.0*   BUN mg/dL 76* 84* 112* 109*   CREATININE mg/dL 2.49* 2.89* 3.18* 3.32*   GLUCOSE mg/dL 123* 115* 177* 186*  "  Estimated Creatinine Clearance: 21.7 mL/min (A) (by C-G formula based on SCr of 2.49 mg/dL (H)).  Results from last 7 days   Lab Units 11/11/24  0549 11/10/24  0437 11/09/24 0313 11/07/24  1833   ALBUMIN g/dL 1.8* 1.9* 2.2* 2.3*   BILIRUBIN mg/dL  --  0.3  --  0.3   ALK PHOS U/L  --  67  --  76   AST (SGOT) U/L  --  21  --  12   ALT (SGPT) U/L  --  14  --  11     Results from last 7 days   Lab Units 11/11/24  0549 11/10/24  0437 11/09/24 0313 11/08/24 2023 11/08/24  1343 11/08/24  0357 11/07/24  2312 11/07/24  1833   CALCIUM mg/dL 7.6* 7.2* 7.7* 7.4*   < > 7.5*   < > 8.2*   ALBUMIN g/dL 1.8* 1.9* 2.2*  --   --   --   --  2.3*   MAGNESIUM mg/dL 2.3 2.3 2.5*  --   --  2.9*  --   --    PHOSPHORUS mg/dL 3.7 3.3 3.4  --   --  3.5  --   --     < > = values in this interval not displayed.     Results from last 7 days   Lab Units 11/08/24  0209 11/07/24 2209 11/07/24  1833   LACTATE mmol/L 1.4 2.3* 4.2*   No results found for: \"COVID19\"  Glucose   Date/Time Value Ref Range Status   11/11/2024 1209 113 70 - 130 mg/dL Final   11/11/2024 0625 119 70 - 130 mg/dL Final       XR Abdomen KUB  Narrative: XR ABDOMEN KUB-     DATE OF EXAM: 11/11/2024 10:14 AM     INDICATION: verify tube location.     COMPARISON: Radiographs 11/10/2024, 11/9/2024, and 11/8/2024. CT [address]24.     TECHNIQUE: A single portable supine AP view of the abdomen was obtained.     FINDINGS:  The lower abdomen, far left lateral hemiabdomen, and pelvis are not  included in the field-of-view. Patient rotation. Overlying artifacts.  Enteric tube looped in the left upper quadrant in the region of the  gastric fundus with the tip terminating in the right upper quadrant,  likely at the pylorus or duodenal bulb. Mildly air distended small  enlarged bowel loops. No significantly dilated loops of bowel. No  evidence of pneumatosis. Partially imaged bilateral ureteral stents and  bilateral nephrolithiasis. Elevation of the left hemidiaphragm.     Impression: Limited " study demonstrating an enteric tube looped in the left upper  quadrant in the region of the gastric fundus with the tip terminating in  the right upper quadrant in the region of the pylorus/duodenal bulb.  Could confirm position with CT if clinically indicated.     This report was finalized on 11/11/2024 10:53 AM by Hamlet Celaya MD on  Workstation: [id number]       Scheduled Medications  midodrine, 5 mg, Oral, TID AC    Infusions   Diet  Diet: Regular/House; Texture: Mechanical Ground (NDD 2); Fluid Consistency: Thin (IDDSI 0)       Assessment/Plan     Active Hospital Problems    Diagnosis  POA    **Hypernatremia [E87.0]  Yes    LEVON (acute kidney injury) [N17.9]  Yes    Acute metabolic encephalopathy [G93.41]  Yes    UTI (urinary tract infection) due to urinary indwelling catheter [T83.511A, N39.0]  Yes    Severe sepsis [A41.9, R65.20]  Yes    Elevated troponin [R79.89]  Yes    Anemia [D64.9]  Yes    Thrombocytopenia [D69.6]  Yes    Perforation of rectum [K63.1]  Unknown    Dementia with behavioral disturbance [F03.918]  Yes    Severe protein-calorie malnutrition [E43]  Yes    Chronic kidney disease, 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       84 y.o. male admitted with Hypernatremia.    Mr. Jefferson is a 84 y.o. male with a history of Alzheimer's dementia, CKD, chronic Kim catheter and hypertension that presented to the hospital with altered mental status.  He hadn't eaten for the last 2 days and was not acting like himself.  He was mostly bedridden at baseline and was found to be altered with low blood pressure on arrival.  He was reported to also be nonverbal with severe dementia.  He was hypernatremic with a sodium of 172 and given IV fluids for hypotension and concerns for sepsis.  He was admitted to the ICU and nephrology was consulted.  He was found to have severe sepsis secondary to UTI in the setting of chronic indwelling  catheter.  He was placed on empiric antibiotics.  CT A/P was obtained showing a rectal probe going through the anterior wall of the rectum and located over the left presacral space.  General surgery was consulted and the rectal probe was removed.  He was weaned off pressors and his sodium levels improved.  He was cleared to move out of the ICU on 11/10 and [facility] was asked to assume care. Ultimately, it was felt that the patient would require a feeding tube, but the family didn't feel this was consistent with the patient's wishes and so he was transitioned to comfort care. All therapies not for comfort except midodrine have been discontinued and the palliative care order set was initiated on 11/11/24.     Currently only requiring infrequent doses of oral morphine. Unclear if he's eating. We'll continue to monitor his progression.      Severe hypernatremia  LEVON on CKD2  -prerenal levon due to dehydration  -LEVON was improving with IVF on last check  -hypernatremia had resolved with D5w     Concern for UTI due to chronic indwelling catheter causing septic shock  -Treated with 3 days of Cefepime.   -Urine culture grew 50,000 Enterococcus faecium, VRE and Steptococcus ovis-> felt contaminant by pulmonology.  -his shock was more likely hypovolemic in nature since no infection was identified      Acute metabolic encephalopathy  Dementia with behavioral disturbance  -Mostly bedridden and nonverbal per admitting notes.  -required restraints for a while, but is now out of these   -Poor candidate for PEG with underlying dementia     Extraperitoneal perforation of the anterior wall of the rectum  -Iatrogenic in nature. Probe removed by surgery.  -General surgery evaluated.     Anemia  -stable on last check at 8.2    Thrombocytopenia  -stable on last check at 83    HTN with hypotension this admission  -On midodrine and previously pressors.    DVT PPX: not consistent with goals of care  Code: comfort measures  Discussed with spouse,  family, and nursing staff.  Anticipate discharge  HSB vs. SNF with hospice  timing yet to be determined.      Marko Tam MD  [facility]  11/13/24  19:30 EST

## 2024-11-14 NOTE — PROGRESS NOTES
Discharge Planning Assessment  [facility]     Patient Name: Sabas Jefferson  MRN: [id number]  Today's Date: 11/14/2024    Admit Date: 11/7/2024    Plan: PALLIATIVE   Discharge Needs Assessment    No documentation.                  Discharge Plan       Row Name 11/14/24 1621       Plan    Plan Comments The patient's spouse and daughter will talk with CCP this afternoon when they come in to see the patient. CCP will follow and assist with any needs. BRIA Bethea RN, CCP.                  Continued Care and Services - Admitted Since 11/7/2024    No active coordination exists for this encounter.       Expected Discharge Date and Time       Expected Discharge Date Expected Discharge Time    Nov 16, 2024            Demographic Summary    No documentation.                  Functional Status    No documentation.                  Psychosocial    No documentation.                  Abuse/Neglect    No documentation.                  Legal    No documentation.                  Substance Abuse    No documentation.                  Patient Forms    No documentation.                     Beata Bethea, RN

## 2024-11-14 NOTE — PLAN OF CARE
Goal Outcome Evaluation:     Patient has a PPS of 30%.  Alert and oriented to self with pleasant confusion.  Appetite is poor.  Increased sleep throughout the day.  As needed Tylenol given x 1 this day for management of pain.

## 2024-11-15 PROCEDURE — 25010000002 MORPHINE PER 10 MG: Performed by: NURSE PRACTITIONER

## 2024-11-15 RX ADMIN — MORPHINE SULFATE 5 MG: 20 SOLUTION ORAL at 17:52

## 2024-11-15 RX ADMIN — MIDODRINE HYDROCHLORIDE 5 MG: 5 TABLET ORAL at 07:20

## 2024-11-15 RX ADMIN — ACETAMINOPHEN 325MG 650 MG: 325 TABLET ORAL at 07:20

## 2024-11-15 RX ADMIN — MORPHINE SULFATE 2 MG: 2 INJECTION, SOLUTION INTRAMUSCULAR; INTRAVENOUS at 11:16

## 2024-11-15 RX ADMIN — MIDODRINE HYDROCHLORIDE 5 MG: 5 TABLET ORAL at 17:54

## 2024-11-15 RX ADMIN — MORPHINE SULFATE 5 MG: 20 SOLUTION ORAL at 14:09

## 2024-11-15 NOTE — PROGRESS NOTES
Discharge Planning Assessment  [facility]     Patient Name: Sabas Jefferson  MRN: [id number]  Today's Date: 11/15/2024    Admit Date: 11/7/2024    Plan: PALLIATIVE   Discharge Needs Assessment    No documentation.                  Discharge Plan       Row Name 11/15/24 1705       Plan    Plan Comments Sent message to Weekend/CCP if they could call the son for rehab choices. BRIA Bethea RN, CCP.                  Continued Care and Services - Admitted Since 11/7/2024    No active coordination exists for this encounter.       Expected Discharge Date and Time       Expected Discharge Date Expected Discharge Time    Nov 18, 2024            Demographic Summary    No documentation.                  Functional Status    No documentation.                  Psychosocial    No documentation.                  Abuse/Neglect    No documentation.                  Legal    No documentation.                  Substance Abuse    No documentation.                  Patient Forms    No documentation.                     Beata Bethea, DANIELA

## 2024-11-15 NOTE — PROGRESS NOTES
"    Name: Sabas Jefferson ADMIT: 2024   : 1940  PCP: Koby Holbrook MD    MRN: [id number] LOS: 8 days   AGE/SEX: 84 y.o. male  ROOM: [facility]     Subjective   Subjective     No events overnight. He spoke to me a little bit today which was different than before. I asked him how he was doing and he said, \"I'm fine\". He did not respond to any of my other questions.       Objective   Objective   Vital Signs  Temp:  [96.7 °F (35.9 °C)-97.7 °F (36.5 °C)] 97.7 °F (36.5 °C)  Heart Rate:  [101] 101  Resp:  [18] 18  BP: ()/(60-62) 102/62  SpO2:  [94 %] 94 %  on   ;   Device (Oxygen Therapy): room air  Body mass index is 21.26 kg/m².  Physical Exam  Constitutional:       General: He is not in acute distress.     Appearance: He is not toxic-appearing.   Cardiovascular:      Rate and Rhythm: Normal rate and regular rhythm.      Heart sounds: Normal heart sounds.   Pulmonary:      Effort: Pulmonary effort is normal.      Breath sounds: Normal breath sounds.   Abdominal:      General: Bowel sounds are normal.      Palpations: Abdomen is soft.   Musculoskeletal:         General: No tenderness.      Right lower leg: Edema present.      Left lower leg: Edema present.   Neurological:      General: No focal deficit present.      Mental Status: He is alert. Mental status is at baseline. He is disoriented.   Psychiatric:         Mood and Affect: Mood normal.         Behavior: Behavior normal.         Results Review     I reviewed the patient's new clinical results.  Results from last 7 days   Lab Units 2449 11/10/24  0437 11/09/24  0313   WBC 10*3/mm3 13.06* 10.27 10.64   HEMOGLOBIN g/dL 8.2* 7.8* 7.7*   PLATELETS 10*3/mm3 83* 69* 80*     Results from last 7 days   Lab Units 2449 11/10/24  0437 11/09/24  0313 11/08/24  2023   SODIUM mmol/L 138 147* 158* 165*   POTASSIUM mmol/L 3.9 3.8 4.0 4.2   CHLORIDE mmol/L 110* 119* 129* 133*   CO2 mmol/L 15.0* 16.0* 17.8* 19.0*   BUN mg/dL 76* 84* 112* 109* " "  CREATININE mg/dL 2.49* 2.89* 3.18* 3.32*   GLUCOSE mg/dL 123* 115* 177* 186*   Estimated Creatinine Clearance: 21.7 mL/min (A) (by C-G formula based on SCr of 2.49 mg/dL (H)).  Results from last 7 days   Lab Units 11/11/24  0549 11/10/24  0437 11/09/24  0313   ALBUMIN g/dL 1.8* 1.9* 2.2*   BILIRUBIN mg/dL  --  0.3  --    ALK PHOS U/L  --  67  --    AST (SGOT) U/L  --  21  --    ALT (SGPT) U/L  --  14  --      Results from last 7 days   Lab Units 11/11/24  0549 11/10/24  0437 11/09/24  0313 11/08/24  2023   CALCIUM mg/dL 7.6* 7.2* 7.7* 7.4*   ALBUMIN g/dL 1.8* 1.9* 2.2*  --    MAGNESIUM mg/dL 2.3 2.3 2.5*  --    PHOSPHORUS mg/dL 3.7 3.3 3.4  --          No results found for: \"COVID19\"  No results found for: \"HGBA1C\", \"POCGLU\"      XR Abdomen KUB  Narrative: XR ABDOMEN KUB-     DATE OF EXAM: 11/11/2024 10:14 AM     INDICATION: verify tube location.     COMPARISON: Radiographs 11/10/2024, 11/9/2024, and 11/8/2024. CT 83763.     TECHNIQUE: A single portable supine AP view of the abdomen was obtained.     FINDINGS:  The lower abdomen, far left lateral hemiabdomen, and pelvis are not  included in the field-of-view. Patient rotation. Overlying artifacts.  Enteric tube looped in the left upper quadrant in the region of the  gastric fundus with the tip terminating in the right upper quadrant,  likely at the pylorus or duodenal bulb. Mildly air distended small  enlarged bowel loops. No significantly dilated loops of bowel. No  evidence of pneumatosis. Partially imaged bilateral ureteral stents and  bilateral nephrolithiasis. Elevation of the left hemidiaphragm.     Impression: Limited study demonstrating an enteric tube looped in the left upper  quadrant in the region of the gastric fundus with the tip terminating in  the right upper quadrant in the region of the pylorus/duodenal bulb.  Could confirm position with CT if clinically indicated.     This report was finalized on 11/11/2024 10:53 AM by Hamlet Celaya MD " on  Workstation: [id number]       Scheduled Medications  midodrine, 5 mg, Oral, TID AC    Infusions   Diet  Diet: Regular/House; Texture: Mechanical Ground (NDD 2); Fluid Consistency: Thin (IDDSI 0)       Assessment/Plan     Active Hospital Problems    Diagnosis  POA    **Hypernatremia [E87.0]  Yes    LEVON (acute kidney injury) [N17.9]  Yes    Acute metabolic encephalopathy [G93.41]  Yes    UTI (urinary tract infection) due to urinary indwelling catheter [T83.511A, N39.0]  Yes    Severe sepsis [A41.9, R65.20]  Yes    Elevated troponin [R79.89]  Yes    Anemia [D64.9]  Yes    Thrombocytopenia [D69.6]  Yes    Perforation of rectum [K63.1]  Unknown    Dementia with behavioral disturbance [F03.918]  Yes    Severe protein-calorie malnutrition [E43]  Yes    Chronic kidney disease, 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       84 y.o. male admitted with Hypernatremia.    Mr. Jefferson is a 84 y.o. male with a history of Alzheimer's dementia, CKD, chronic Kim catheter and hypertension that presented to the hospital with altered mental status.  He hadn't eaten for the last 2 days and was not acting like himself.  He was mostly bedridden at baseline and was found to be altered with low blood pressure on arrival.  He was reported to also be nonverbal with severe dementia.  He was hypernatremic with a sodium of 172 and given IV fluids for hypotension and concerns for sepsis.  He was admitted to the ICU and nephrology was consulted.  He was found to have severe sepsis secondary to UTI in the setting of chronic indwelling catheter.  He was placed on empiric antibiotics.  CT A/P was obtained showing a rectal probe going through the anterior wall of the rectum and located over the left presacral space.  General surgery was consulted and the rectal probe was removed.  He was weaned off pressors and his sodium levels improved.  He was cleared to move out  of the ICU on 11/10 and A was asked to assume care. Ultimately, it was felt that the patient would require a feeding tube, but the family didn't feel this was consistent with the patient's wishes and so he was transitioned to comfort care. All therapies not for comfort except midodrine have been discontinued and the palliative care order set was initiated on 11/11/24.     He required 1 dose of IV morphine, a dose of oral morphine, and some tylenol today.      Severe hypernatremia  LEVON on CKD2  -prerenal levon due to dehydration  -LEVON was improving with IVF on last check  -hypernatremia had resolved with D5w     Concern for UTI due to chronic indwelling catheter causing septic shock  -Treated with 3 days of Cefepime.   -Urine culture grew 50,000 Enterococcus faecium, VRE and Steptococcus ovis-> felt contaminant by pulmonology.  -his shock was more likely hypovolemic in nature since no infection was identified      Acute metabolic encephalopathy  Dementia with behavioral disturbance  -Mostly bedridden and nonverbal per admitting notes.  -required restraints for a while, but is now out of these   -Poor candidate for PEG with underlying dementia  -appears to be at baseline     Extraperitoneal perforation of the anterior wall of the rectum  -Iatrogenic in nature. Probe removed by surgery.  -General surgery evaluated.     Anemia  -stable on last check at 8.2    Thrombocytopenia  -stable on last check at 83    HTN with hypotension this admission  -On midodrine and previously pressors.    DVT PPX: not consistent with goals of care  Code: comfort measures  Discussed with patient.  Anticipate discharge  HSB vs. SNF with hospice  once arrangements have been made.      Marko Tam MD  [facility]  11/15/24  15:34 EST

## 2024-11-16 RX ADMIN — MIDODRINE HYDROCHLORIDE 5 MG: 5 TABLET ORAL at 13:53

## 2024-11-16 NOTE — CASE MANAGEMENT/SOCIAL WORK
Continued Stay Note  [facility]     Patient Name: Sabas Jefferson  MRN: [id number]  Today's Date: 11/16/2024    Admit Date: 11/7/2024    Plan: son Sabas to review SNF choices with pt's dtr and spouse and call CCP back   Discharge Plan       Row Name 11/16/24 1812       Plan    Plan son Sabas to review SNF choices with pt's dtr and spouse and call CCP back    Patient/Family in Agreement with Plan yes    Plan Comments Inbound call from pt's son Sabas--he states he still needs to review and discuss with pt's dtr and spouse. He will do that this evening or tomorrow morning and call CCP back. Son Sabas states one facility they are considering is [facility] b/c pt's dtr works there, but it is far for pt's spouse to get to. Explained the referral process to Sabas and he verbalized understanding. CCP to follow...JONNY      Row Name 11/16/24 1710       Plan    Plan Comments Attempted to contact pt's son re: rehab choices. No answer--left generic VMM for a return call to discuss DC plans. CCP to follow........JW                   Discharge Codes    No documentation.                 Expected Discharge Date and Time       Expected Discharge Date Expected Discharge Time    Nov 18, 2024               Taryn Lizarraga RN

## 2024-11-16 NOTE — PLAN OF CARE
Goal Outcome Evaluation:  Plan of Care Reviewed With: patient, family           Outcome Evaluation: Pt alert. Medicating for pain with sublingual morphine. Wound care performed. F/C in place. Pt appears comfortable and in no acute pain/distress at this time.

## 2024-11-16 NOTE — CASE MANAGEMENT/SOCIAL WORK
Continued Stay Note  [facility]     Patient Name: Sabas Jefferson  MRN: [id number]  Today's Date: 11/16/2024    Admit Date: 11/7/2024    Plan: PALLIATIVE   Discharge Plan       Row Name 11/16/24 5330       Plan    Plan Comments Attempted to contact pt's son re: rehab choices. No answer--left generic VMM for a return call to discuss DC plans. CCP to follow........JW                   Discharge Codes    No documentation.                 Expected Discharge Date and Time       Expected Discharge Date Expected Discharge Time    Nov 18, 2024               Taryn Lizarraga RN

## 2024-11-16 NOTE — PLAN OF CARE
Goal Outcome Evaluation:  Plan of Care Reviewed With: patient        Progress: no change  Outcome Evaluation: vss. alert. non verbal. no c/o and no nonverbal indicators of pain noted. f/c in place. q2turn. patient resting comfortably during shift.                             Problem: Adult Inpatient Plan of Care  Goal: Plan of Care Review  Outcome: Not Progressing  Flowsheets (Taken 11/16/2024 0511)  Progress: no change  Outcome Evaluation: vss. alert. non verbal. no c/o and no nonverbal indicators of pain noted. f/c in place. q2turn. patient resting comfortably during shift.  Plan of Care Reviewed With: patient  Goal: Patient-Specific Goal (Individualized)  Outcome: Not Progressing  Goal: Absence of Hospital-Acquired Illness or Injury  Outcome: Not Progressing  Intervention: Identify and Manage Fall Risk  Recent Flowsheet Documentation  Taken 11/16/2024 0409 by Soni Cast, RN  Safety Promotion/Fall Prevention: safety round/check completed  Taken 11/16/2024 0216 by Soni Cast, RN  Safety Promotion/Fall Prevention: safety round/check completed  Taken 11/16/2024 0000 by Soni Cast, RN  Safety Promotion/Fall Prevention: safety round/check completed  Taken 11/15/2024 2231 by Soni Cast, RN  Safety Promotion/Fall Prevention: safety round/check completed  Taken 11/15/2024 2010 by Soni Cast, RN  Safety Promotion/Fall Prevention: safety round/check completed  Intervention: Prevent and Manage VTE (Venous Thromboembolism) Risk  Recent Flowsheet Documentation  Taken 11/15/2024 2010 by Soni Cast, RN  VTE Prevention/Management:   bilateral   SCDs (sequential compression devices) off  Goal: Optimal Comfort and Wellbeing  Outcome: Not Progressing  Intervention: Provide Person-Centered Care  Recent Flowsheet Documentation  Taken 11/15/2024 2010 by Soni Cast, RN  Trust Relationship/Rapport: care explained  Goal: Readiness for Transition of Care  Outcome: Not Progressing      Problem: Fall Injury Risk  Goal: Absence of Fall and Fall-Related Injury  Outcome: Not Progressing  Intervention: Promote Injury-Free Environment  Recent Flowsheet Documentation  Taken 11/16/2024 0409 by Soni Cast, RN  Safety Promotion/Fall Prevention: safety round/check completed  Taken 11/16/2024 0216 by Soni Cast, RN  Safety Promotion/Fall Prevention: safety round/check completed  Taken 11/16/2024 0000 by Soni Cast, RN  Safety Promotion/Fall Prevention: safety round/check completed  Taken 11/15/2024 2231 by Soni Cast, RN  Safety Promotion/Fall Prevention: safety round/check completed  Taken 11/15/2024 2010 by Soni Cast, RN  Safety Promotion/Fall Prevention: safety round/check completed     Problem: Sepsis/Septic Shock  Goal: Optimal Coping  Outcome: Not Progressing  Goal: Absence of Bleeding  Outcome: Not Progressing  Goal: Blood Glucose Level Within Target Range  Outcome: Not Progressing  Goal: Absence of Infection Signs and Symptoms  Outcome: Not Progressing  Goal: Optimal Nutrition Delivery  Outcome: Not Progressing     Problem: Malnutrition  Goal: Improved Nutritional Intake  Outcome: Not Progressing     Problem: Comorbidity Management  Goal: Maintenance of Asthma Control  Outcome: Not Progressing  Goal: Maintenance of Behavioral Health Symptom Control  Outcome: Not Progressing  Goal: Maintenance of COPD Symptom Control  Outcome: Not Progressing  Goal: Blood Glucose Level Within Target Range  Outcome: Not Progressing  Goal: Maintenance of Heart Failure Symptom Control  Outcome: Not Progressing  Goal: Blood Pressure in Desired Range  Outcome: Not Progressing  Goal: Maintenance of Osteoarthritis Symptom Control  Outcome: Not Progressing  Goal: Bariatric Home Regimen Maintained  Outcome: Not Progressing  Goal: Maintenance of Seizure Control  Outcome: Not Progressing     Problem: Pain Acute  Goal: Optimal Pain Control and Function  Outcome: Not Progressing  Intervention:  Optimize Psychosocial Wellbeing  Recent Flowsheet Documentation  Taken 11/15/2024 2010 by Soni Cast, RN  Diversional Activities: television     Problem: Enteral Nutrition  Goal: Absence of Aspiration Signs and Symptoms  Outcome: Not Progressing  Goal: Safe, Effective Therapy Delivery  Outcome: Not Progressing  Goal: Feeding Tolerance  Outcome: Not Progressing     Problem: Skin Injury Risk Increased  Goal: Skin Health and Integrity  Outcome: Not Progressing  Intervention: Optimize Skin Protection  Recent Flowsheet Documentation  Taken 11/15/2024 2010 by Soni Cast, RN  Pressure Reduction Techniques: weight shift assistance provided  Pressure Reduction Devices:   positioning supports utilized   specialty bed utilized

## 2024-11-16 NOTE — PROGRESS NOTES
Name: Sabas Jefferson ADMIT: 2024   : 1940  PCP: Koby Holbrook MD    MRN: [id number] LOS: 9 days   AGE/SEX: 84 y.o. male  ROOM: [facility]/     Subjective   Subjective     No events overnight. No change in comparison to yesterday. Give me a single one word answer of yes when I asked him if he was doing ok today, and then just stared at me otherwise.       Objective   Objective   Vital Signs  Temp:  [96.8 °F (36 °C)] 96.8 °F (36 °C)  Heart Rate:  [109] 109  Resp:  [24] 24  BP: (119)/(73) 119/73     on   ;   Device (Oxygen Therapy): room air  Body mass index is 21.26 kg/m².  Physical Exam  Constitutional:       General: He is not in acute distress.     Appearance: He is not toxic-appearing.   Cardiovascular:      Rate and Rhythm: Normal rate and regular rhythm.      Heart sounds: Normal heart sounds.   Pulmonary:      Effort: Pulmonary effort is normal.      Breath sounds: Normal breath sounds.   Abdominal:      General: Bowel sounds are normal.      Palpations: Abdomen is soft.   Musculoskeletal:         General: No tenderness.      Right lower leg: Edema present.      Left lower leg: Edema present.   Neurological:      General: No focal deficit present.      Mental Status: He is alert. Mental status is at baseline. He is disoriented.   Psychiatric:         Mood and Affect: Mood normal.         Behavior: Behavior normal.         Results Review     I reviewed the patient's new clinical results.  Results from last 7 days   Lab Units 24  0549 11/10/24  0437   WBC 10*3/mm3 13.06* 10.27   HEMOGLOBIN g/dL 8.2* 7.8*   PLATELETS 10*3/mm3 83* 69*     Results from last 7 days   Lab Units 24  0549 11/10/24  0437   SODIUM mmol/L 138 147*   POTASSIUM mmol/L 3.9 3.8   CHLORIDE mmol/L 110* 119*   CO2 mmol/L 15.0* 16.0*   BUN mg/dL 76* 84*   CREATININE mg/dL 2.49* 2.89*   GLUCOSE mg/dL 123* 115*   Estimated Creatinine Clearance: 21.7 mL/min (A) (by C-G formula based on SCr of 2.49 mg/dL (H)).  Results  "from last 7 days   Lab Units 11/11/24  0549 11/10/24  0437   ALBUMIN g/dL 1.8* 1.9*   BILIRUBIN mg/dL  --  0.3   ALK PHOS U/L  --  67   AST (SGOT) U/L  --  21   ALT (SGPT) U/L  --  14     Results from last 7 days   Lab Units 11/11/24  0549 11/10/24  0437   CALCIUM mg/dL 7.6* 7.2*   ALBUMIN g/dL 1.8* 1.9*   MAGNESIUM mg/dL 2.3 2.3   PHOSPHORUS mg/dL 3.7 3.3         No results found for: \"COVID19\"  No results found for: \"HGBA1C\", \"POCGLU\"      XR Abdomen KUB  Narrative: XR ABDOMEN KUB-     DATE OF EXAM: 11/11/2024 10:14 AM     INDICATION: verify tube location.     COMPARISON: Radiographs 11/10/2024, 11/9/2024, and 11/8/2024. CT 69538.     TECHNIQUE: A single portable supine AP view of the abdomen was obtained.     FINDINGS:  The lower abdomen, far left lateral hemiabdomen, and pelvis are not  included in the field-of-view. Patient rotation. Overlying artifacts.  Enteric tube looped in the left upper quadrant in the region of the  gastric fundus with the tip terminating in the right upper quadrant,  likely at the pylorus or duodenal bulb. Mildly air distended small  enlarged bowel loops. No significantly dilated loops of bowel. No  evidence of pneumatosis. Partially imaged bilateral ureteral stents and  bilateral nephrolithiasis. Elevation of the left hemidiaphragm.     Impression: Limited study demonstrating an enteric tube looped in the left upper  quadrant in the region of the gastric fundus with the tip terminating in  the right upper quadrant in the region of the pylorus/duodenal bulb.  Could confirm position with CT if clinically indicated.     This report was finalized on 11/11/2024 10:53 AM by Hamlet Celaya MD on  Workstation: [id number]       Scheduled Medications  midodrine, 5 mg, Oral, TID AC    Infusions   Diet  Diet: Regular/House; Texture: Mechanical Ground (NDD 2); Fluid Consistency: Thin (IDDSI 0)       Assessment/Plan     Active Hospital Problems    Diagnosis  POA    **Hypernatremia [E87.0]  Yes    " LEVON (acute kidney injury) [N17.9]  Yes    Acute metabolic encephalopathy [G93.41]  Yes    UTI (urinary tract infection) due to urinary indwelling catheter [T83.511A, N39.0]  Yes    Severe sepsis [A41.9, R65.20]  Yes    Elevated troponin [R79.89]  Yes    Anemia [D64.9]  Yes    Thrombocytopenia [D69.6]  Yes    Perforation of rectum [K63.1]  Unknown    Dementia with behavioral disturbance [F03.918]  Yes    Severe protein-calorie malnutrition [E43]  Yes    Chronic kidney disease, 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       84 y.o. male admitted with Hypernatremia.    Mr. Jefferson is a 84 y.o. male with a history of Alzheimer's dementia, CKD, chronic Kim catheter and hypertension that presented to the hospital with altered mental status.  He hadn't eaten for a couple of prior to admission and was not acting like himself.  He is mostly bedridden at baseline and was found to be altered with low blood pressure on arrival.  He was reported to also be nonverbal with severe dementia.  He was hypernatremic with a sodium of 172 and given IV fluids for hypotension and concerns for sepsis.  He was admitted to the ICU and nephrology was consulted.  He was suspected to have severe sepsis secondary to UTI in the setting of chronic indwelling catheter.  He was placed on empiric antibiotics.  CT A/P was obtained showing a rectal probe going through the anterior wall of the rectum and located over the left presacral space.  General surgery was consulted and the rectal probe was removed.  He was weaned off pressors and his sodium levels improved.  He was cleared to move out of the ICU on 11/10 and [facility] was asked to assume care. Ultimately, it was felt that the patient would require a feeding tube, but the family didn't feel this was consistent with the patient's wishes and so he was transitioned to comfort care. All therapies not for comfort except  midodrine have been discontinued and the palliative care order set was initiated on 11/11/24.     He hasn't yet required any prn medications for symptom management today     Severe hypernatremia  LEVON on CKD2  -prerenal levon due to dehydration  -LEVON was improving with IVF on last check  -hypernatremia had resolved with D5w     Concern for UTI due to chronic indwelling catheter causing septic shock  -Treated with 3 days of Cefepime.   -Urine culture grew 50,000 Enterococcus faecium, VRE and Steptococcus ovis-> felt contaminant by pulmonology.  -his shock was more likely hypovolemic in nature since no infection was identified      Acute metabolic encephalopathy  Dementia with behavioral disturbance  -Mostly bedridden and nonverbal per admitting notes.  -required restraints for a while, but is now out of these   -Poor candidate for PEG with underlying dementia  -appears to be at baseline     Extraperitoneal perforation of the anterior wall of the rectum  -Iatrogenic in nature. Probe removed by surgery.  -General surgery evaluated.     Anemia  -stable on last check at 8.2    Thrombocytopenia  -stable on last check at 83    HTN with hypotension this admission  -On midodrine and previously pressors.    DVT PPX: not consistent with goals of care  Code: comfort measures  Discussed with patient and family.   Anticipate discharge  HSB vs. SNF with hospice  once arrangements have been made.    I spoke with the patient's daughter, Janay, via telephone today because she noticed that he was a little more alert and eating a little bit more than a few days ago and she was wondering if we should reinitiate medical care and whether his dementia was going to improve. I told her that while we could restart checking labs and correcting electrolytes, that it was unlikely to substantially change the course of his dementia, prolong his life, or improve the quality of his life. I was also clear that his dementia wouldn't improve no matter  what we did. She was accepting of this and we discussed next steps. He's not currently requiring many prn medications for symptom control and so we'll monitor him through the weekend and if he's not eating at all or if he has a significant decline and is requiring frequent IV pain and anxiety medications, then we'll stay put. Otherwise, we'll need to start looking toward going to a skilled nursing facility with hospice.       Marko Tam MD  [facility]  11/16/24  15:38 EST

## 2024-11-16 NOTE — PLAN OF CARE
Palliative care patient pps 20% resting comfortably in bed. Minimally verbal, denies pain, chronic f/c in place. No current signs of distress, no prns this shift.

## 2024-11-17 ENCOUNTER — APPOINTMENT (OUTPATIENT)
Dept: CT IMAGING | Facility: HOSPITAL | Age: 84
End: 2024-11-17
Payer: MEDICARE

## 2024-11-17 ENCOUNTER — APPOINTMENT (OUTPATIENT)
Dept: GENERAL RADIOLOGY | Facility: HOSPITAL | Age: 84
End: 2024-11-17
Payer: MEDICARE

## 2024-11-17 LAB
ALBUMIN SERPL-MCNC: 1.6 G/DL (ref 3.5–5.2)
ALBUMIN/GLOB SERPL: 0.4 G/DL
ALP SERPL-CCNC: 173 U/L (ref 39–117)
ALT SERPL W P-5'-P-CCNC: 43 U/L (ref 1–41)
ANION GAP SERPL CALCULATED.3IONS-SCNC: 11.8 MMOL/L (ref 5–15)
AST SERPL-CCNC: 126 U/L (ref 1–40)
BACTERIA UR QL AUTO: ABNORMAL /HPF
BILIRUB SERPL-MCNC: 0.8 MG/DL (ref 0–1.2)
BILIRUB UR QL STRIP: NEGATIVE
BUN SERPL-MCNC: 34 MG/DL (ref 8–23)
BUN/CREAT SERPL: 21.1 (ref 7–25)
CALCIUM SPEC-SCNC: 7.4 MG/DL (ref 8.6–10.5)
CHLORIDE SERPL-SCNC: 106 MMOL/L (ref 98–107)
CLARITY UR: ABNORMAL
CO2 SERPL-SCNC: 17.2 MMOL/L (ref 22–29)
COLOR UR: YELLOW
CREAT SERPL-MCNC: 1.61 MG/DL (ref 0.76–1.27)
DEPRECATED RDW RBC AUTO: 49.2 FL (ref 37–54)
EGFRCR SERPLBLD CKD-EPI 2021: 42.4 ML/MIN/1.73
ERYTHROCYTE [DISTWIDTH] IN BLOOD BY AUTOMATED COUNT: 14.6 % (ref 12.3–15.4)
GLOBULIN UR ELPH-MCNC: 3.7 GM/DL
GLUCOSE SERPL-MCNC: 113 MG/DL (ref 65–99)
GLUCOSE UR STRIP-MCNC: NEGATIVE MG/DL
GRAN CASTS URNS QL MICRO: PRESENT /LPF
HCT VFR BLD AUTO: 23.7 % (ref 37.5–51)
HGB BLD-MCNC: 8 G/DL (ref 13–17.7)
HGB UR QL STRIP.AUTO: ABNORMAL
HYALINE CASTS UR QL AUTO: ABNORMAL /LPF
KETONES UR QL STRIP: NEGATIVE
LEUKOCYTE ESTERASE UR QL STRIP.AUTO: ABNORMAL
MAGNESIUM SERPL-MCNC: 2.2 MG/DL (ref 1.6–2.4)
MCH RBC QN AUTO: 31.5 PG (ref 26.6–33)
MCHC RBC AUTO-ENTMCNC: 33.8 G/DL (ref 31.5–35.7)
MCV RBC AUTO: 93.3 FL (ref 79–97)
NITRITE UR QL STRIP: NEGATIVE
PH UR STRIP.AUTO: 5.5 [PH] (ref 5–8)
PHOSPHATE SERPL-MCNC: 2.4 MG/DL (ref 2.5–4.5)
PLATELET # BLD AUTO: 244 10*3/MM3 (ref 140–450)
PMV BLD AUTO: 9.5 FL (ref 6–12)
POTASSIUM SERPL-SCNC: 3.9 MMOL/L (ref 3.5–5.2)
PROCALCITONIN SERPL-MCNC: 0.48 NG/ML (ref 0–0.25)
PROT SERPL-MCNC: 5.3 G/DL (ref 6–8.5)
PROT UR QL STRIP: ABNORMAL
RBC # BLD AUTO: 2.54 10*6/MM3 (ref 4.14–5.8)
RBC # UR STRIP: ABNORMAL /HPF
REF LAB TEST METHOD: ABNORMAL
SODIUM SERPL-SCNC: 135 MMOL/L (ref 136–145)
SP GR UR STRIP: 1.01 (ref 1–1.03)
SQUAMOUS #/AREA URNS HPF: ABNORMAL /HPF
UROBILINOGEN UR QL STRIP: ABNORMAL
WBC # UR STRIP: ABNORMAL /HPF
WBC CLUMPS # UR AUTO: PRESENT /HPF
WBC NRBC COR # BLD AUTO: 15.35 10*3/MM3 (ref 3.4–10.8)
YEAST URNS QL MICRO: PRESENT /HPF

## 2024-11-17 PROCEDURE — 74176 CT ABD & PELVIS W/O CONTRAST: CPT

## 2024-11-17 PROCEDURE — 87040 BLOOD CULTURE FOR BACTERIA: CPT | Performed by: STUDENT IN AN ORGANIZED HEALTH CARE EDUCATION/TRAINING PROGRAM

## 2024-11-17 PROCEDURE — 84145 PROCALCITONIN (PCT): CPT | Performed by: STUDENT IN AN ORGANIZED HEALTH CARE EDUCATION/TRAINING PROGRAM

## 2024-11-17 PROCEDURE — 87186 SC STD MICRODIL/AGAR DIL: CPT | Performed by: STUDENT IN AN ORGANIZED HEALTH CARE EDUCATION/TRAINING PROGRAM

## 2024-11-17 PROCEDURE — 71045 X-RAY EXAM CHEST 1 VIEW: CPT

## 2024-11-17 PROCEDURE — 85027 COMPLETE CBC AUTOMATED: CPT | Performed by: STUDENT IN AN ORGANIZED HEALTH CARE EDUCATION/TRAINING PROGRAM

## 2024-11-17 PROCEDURE — 81001 URINALYSIS AUTO W/SCOPE: CPT | Performed by: STUDENT IN AN ORGANIZED HEALTH CARE EDUCATION/TRAINING PROGRAM

## 2024-11-17 PROCEDURE — 84100 ASSAY OF PHOSPHORUS: CPT | Performed by: STUDENT IN AN ORGANIZED HEALTH CARE EDUCATION/TRAINING PROGRAM

## 2024-11-17 PROCEDURE — 80053 COMPREHEN METABOLIC PANEL: CPT | Performed by: STUDENT IN AN ORGANIZED HEALTH CARE EDUCATION/TRAINING PROGRAM

## 2024-11-17 PROCEDURE — 25010000002 HEPARIN (PORCINE) PER 1000 UNITS: Performed by: STUDENT IN AN ORGANIZED HEALTH CARE EDUCATION/TRAINING PROGRAM

## 2024-11-17 PROCEDURE — 87077 CULTURE AEROBIC IDENTIFY: CPT | Performed by: STUDENT IN AN ORGANIZED HEALTH CARE EDUCATION/TRAINING PROGRAM

## 2024-11-17 PROCEDURE — 87086 URINE CULTURE/COLONY COUNT: CPT | Performed by: STUDENT IN AN ORGANIZED HEALTH CARE EDUCATION/TRAINING PROGRAM

## 2024-11-17 PROCEDURE — 83735 ASSAY OF MAGNESIUM: CPT | Performed by: STUDENT IN AN ORGANIZED HEALTH CARE EDUCATION/TRAINING PROGRAM

## 2024-11-17 RX ORDER — POLYETHYLENE GLYCOL 3350 17 G/17G
17 POWDER, FOR SOLUTION ORAL DAILY PRN
Status: DISCONTINUED | OUTPATIENT
Start: 2024-11-17 | End: 2024-11-22 | Stop reason: HOSPADM

## 2024-11-17 RX ORDER — ONDANSETRON 2 MG/ML
4 INJECTION INTRAMUSCULAR; INTRAVENOUS EVERY 6 HOURS PRN
Status: DISCONTINUED | OUTPATIENT
Start: 2024-11-17 | End: 2024-11-22 | Stop reason: HOSPADM

## 2024-11-17 RX ORDER — BISACODYL 5 MG/1
5 TABLET, DELAYED RELEASE ORAL DAILY PRN
Status: DISCONTINUED | OUTPATIENT
Start: 2024-11-17 | End: 2024-11-22 | Stop reason: HOSPADM

## 2024-11-17 RX ORDER — ONDANSETRON 4 MG/1
4 TABLET, ORALLY DISINTEGRATING ORAL EVERY 6 HOURS PRN
Status: DISCONTINUED | OUTPATIENT
Start: 2024-11-17 | End: 2024-11-22 | Stop reason: HOSPADM

## 2024-11-17 RX ORDER — ACETAMINOPHEN 325 MG/1
650 TABLET ORAL EVERY 4 HOURS PRN
Status: DISCONTINUED | OUTPATIENT
Start: 2024-11-17 | End: 2024-11-21

## 2024-11-17 RX ORDER — HEPARIN SODIUM 5000 [USP'U]/ML
5000 INJECTION, SOLUTION INTRAVENOUS; SUBCUTANEOUS EVERY 8 HOURS SCHEDULED
Status: DISCONTINUED | OUTPATIENT
Start: 2024-11-17 | End: 2024-11-18

## 2024-11-17 RX ORDER — BISACODYL 10 MG
10 SUPPOSITORY, RECTAL RECTAL DAILY PRN
Status: DISCONTINUED | OUTPATIENT
Start: 2024-11-17 | End: 2024-11-22 | Stop reason: HOSPADM

## 2024-11-17 RX ORDER — AMOXICILLIN 250 MG
2 CAPSULE ORAL 2 TIMES DAILY PRN
Status: DISCONTINUED | OUTPATIENT
Start: 2024-11-17 | End: 2024-11-22 | Stop reason: HOSPADM

## 2024-11-17 RX ORDER — BISACODYL 10 MG
10 SUPPOSITORY, RECTAL RECTAL DAILY
Status: DISCONTINUED | OUTPATIENT
Start: 2024-11-18 | End: 2024-11-21

## 2024-11-17 RX ORDER — ACETAMINOPHEN 650 MG/1
650 SUPPOSITORY RECTAL EVERY 4 HOURS PRN
Status: DISCONTINUED | OUTPATIENT
Start: 2024-11-17 | End: 2024-11-21

## 2024-11-17 RX ADMIN — HEPARIN SODIUM 5000 UNITS: 5000 INJECTION INTRAVENOUS; SUBCUTANEOUS at 21:36

## 2024-11-17 RX ADMIN — MIDODRINE HYDROCHLORIDE 5 MG: 5 TABLET ORAL at 17:05

## 2024-11-17 RX ADMIN — MIDODRINE HYDROCHLORIDE 5 MG: 5 TABLET ORAL at 12:18

## 2024-11-17 RX ADMIN — MIDODRINE HYDROCHLORIDE 5 MG: 5 TABLET ORAL at 09:16

## 2024-11-17 NOTE — CASE MANAGEMENT/SOCIAL WORK
Continued Stay Note  [facility]     Patient Name: Sabas Jefferson  MRN: [id number]  Today's Date: 11/17/2024    Admit Date: 11/7/2024    Plan: SNF transitioning to LTC pending for [facility], pt would need insurance precert   Discharge Plan       Row Name 11/17/24 1543       Plan    Plan SNF transitioning to LTC pending for [facility], pt would need insurance precert    Patient/Family in Agreement with Plan yes    Plan Comments received a VMM from pt's family member Janay Crowder ([phone]). Returned her call. Allowed Janay to verbalize feelings and express concerns. She states she spoke with pt's son Sabas and the only facility they have agreed upon thus far is [facility]. Provided Janay with [email or web address] and a copy of the [facility] SNF List to review for other choices. In basket referral for [facility] in [facility]. CCP will continue to follow for DC needs......JW                   Discharge Codes    No documentation.                 Expected Discharge Date and Time       Expected Discharge Date Expected Discharge Time    Nov 18, 2024               Taryn Lizarraga RN

## 2024-11-17 NOTE — PROGRESS NOTES
"    Name: Sabas Jefferson ADMIT: 2024   : 1940  PCP: Koby Holbrook MD    MRN: [id number] LOS: 10 days   AGE/SEX: 84 y.o. male  ROOM: 80/1     Subjective   Subjective     No events overnight. No change in comparison to yesterday. I was told by the patient's RN that the family wishes to reverse goals of care to be full treatment. I called the patient's wife to confirm and she immediately deferred the decision to her daughter, Janay, who I spoke to yesterday. I then called Janay who confirmed that she wished to reverse the goals of care from comfort and move things \"back to the medical side\" and check labs before going to rehab. She says that she knows that all of the doctors and nurses have told her that he's unlikely to improve and that his dementia will not improve, but she feels like he's improving to back how they remember him before he got sick this time.       Objective   Objective   Vital Signs  Temp:  [97.2 °F (36.2 °C)-99 °F (37.2 °C)] 97.2 °F (36.2 °C)  Heart Rate:  [113-116] 116  Resp:  [16] 16  BP: (105-117)/(67-74) 105/67  SpO2:  [98 %-100 %] 100 %  on   ;   Device (Oxygen Therapy): room air  Body mass index is 21.26 kg/m².  Physical Exam  Constitutional:       General: He is not in acute distress.     Appearance: He is not toxic-appearing.   Cardiovascular:      Rate and Rhythm: Normal rate and regular rhythm.      Heart sounds: Normal heart sounds.   Pulmonary:      Effort: Pulmonary effort is normal.      Breath sounds: Normal breath sounds.   Abdominal:      General: Bowel sounds are normal.      Palpations: Abdomen is soft.   Musculoskeletal:         General: No tenderness.      Right lower leg: Edema present.      Left lower leg: Edema present.   Neurological:      General: No focal deficit present.      Mental Status: He is alert. Mental status is at baseline. He is disoriented.   Psychiatric:         Mood and Affect: Mood normal.         Behavior: Behavior normal. " "        Results Review     I reviewed the patient's new clinical results.  Results from last 7 days   Lab Units 11/11/24  0549   WBC 10*3/mm3 13.06*   HEMOGLOBIN g/dL 8.2*   PLATELETS 10*3/mm3 83*     Results from last 7 days   Lab Units 11/11/24  0549   SODIUM mmol/L 138   POTASSIUM mmol/L 3.9   CHLORIDE mmol/L 110*   CO2 mmol/L 15.0*   BUN mg/dL 76*   CREATININE mg/dL 2.49*   GLUCOSE mg/dL 123*   Estimated Creatinine Clearance: 21.7 mL/min (A) (by C-G formula based on SCr of 2.49 mg/dL (H)).  Results from last 7 days   Lab Units 11/11/24  0549   ALBUMIN g/dL 1.8*     Results from last 7 days   Lab Units 11/11/24  0549   CALCIUM mg/dL 7.6*   ALBUMIN g/dL 1.8*   MAGNESIUM mg/dL 2.3   PHOSPHORUS mg/dL 3.7         No results found for: \"COVID19\"  No results found for: \"HGBA1C\", \"POCGLU\"      XR Abdomen KUB  Narrative: XR ABDOMEN KUB-     DATE OF EXAM: 11/11/2024 10:14 AM     INDICATION: verify tube location.     COMPARISON: Radiographs 11/10/2024, 11/9/2024, and 11/8/2024. [place] [address].     TECHNIQUE: A single portable supine AP view of the abdomen was obtained.     FINDINGS:  The lower abdomen, far left lateral hemiabdomen, and pelvis are not  included in the field-of-view. Patient rotation. Overlying artifacts.  Enteric tube looped in the left upper quadrant in the region of the  gastric fundus with the tip terminating in the right upper quadrant,  likely at the pylorus or duodenal bulb. Mildly air distended small  enlarged bowel loops. No significantly dilated loops of bowel. No  evidence of pneumatosis. Partially imaged bilateral ureteral stents and  bilateral nephrolithiasis. Elevation of the left hemidiaphragm.     Impression: Limited study demonstrating an enteric tube looped in the left upper  quadrant in the region of the gastric fundus with the tip terminating in  the right upper quadrant in the region of the pylorus/duodenal bulb.  Could confirm position with CT if clinically indicated.     This report was " finalized on 11/11/2024 10:53 AM by Hamlet Celaya MD on  Workstation: [id number]       Scheduled Medications  midodrine, 5 mg, Oral, TID AC    Infusions   Diet  Diet: Regular/House; Texture: Mechanical Ground (NDD 2); Fluid Consistency: Thin (IDDSI 0)       Assessment/Plan     Active Hospital Problems    Diagnosis  POA    **Hypernatremia [E87.0]  Yes    LEVON (acute kidney injury) [N17.9]  Yes    Acute metabolic encephalopathy [G93.41]  Yes    UTI (urinary tract infection) due to urinary indwelling catheter [T83.511A, N39.0]  Yes    Severe sepsis [A41.9, R65.20]  Yes    Elevated troponin [R79.89]  Yes    Anemia [D64.9]  Yes    Thrombocytopenia [D69.6]  Yes    Perforation of rectum [K63.1]  Unknown    Dementia with behavioral disturbance [F03.918]  Yes    Severe protein-calorie malnutrition [E43]  Yes    Chronic kidney disease, 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       84 y.o. male admitted with Hypernatremia.    Mr. Jefferson is a 84 y.o. male with a history of Alzheimer's dementia, CKD, chronic Kim catheter and hypertension that presented to the hospital with altered mental status.  He hadn't eaten for a couple of prior to admission and was not acting like himself.  He is mostly bedridden at baseline and was found to be altered with low blood pressure on arrival.  He was reported to also be nonverbal with severe dementia.  He was hypernatremic with a sodium of 172 and given IV fluids for hypotension and concerns for sepsis.  He was admitted to the ICU and nephrology was consulted.  He was suspected to have severe sepsis secondary to UTI in the setting of chronic indwelling catheter.  He was placed on empiric antibiotics.  CT A/P was obtained showing a rectal probe going through the anterior wall of the rectum and located over the left presacral space.  General surgery was consulted and the rectal probe was removed.  He was weaned  off pressors and his sodium levels improved.  He was cleared to move out of the ICU on 11/10 and A was asked to assume care. Ultimately, it was felt that the patient would require a feeding tube, but the family didn't feel this was consistent with the patient's wishes and so he was transitioned to comfort care. All therapies not for comfort except midodrine have been discontinued and the palliative care order set was initiated on 11/11/24. He hasn't been requiring many prn medications for symptom management and so on 11/17/24, the patient's family decided to reverse his goals of care to DNR/DNI/full treatment.    Severe hypernatremia  LEVON on CKD2  -prerenal levon due to dehydration  -LEVON was improving with IVF on last check  -hypernatremia has resolved with D5w  -creatinine is down to 1.61 today from 2.49 on 11/11/24  -he appears mostly euvolemic to a little hypervolemic (lower extremity edema), so I don't think any additional therapies are indicated at the moment and would just trend his creatinine for now     Concern for UTI due to chronic indwelling catheter causing septic shock  -Treated with 3 days of Cefepime.   -Urine culture grew 50,000 Enterococcus faecium, VRE and Steptococcus ovis-> felt contaminant by pulmonology.  -his shock was more likely hypovolemic in nature since no infection was clearly identified   -he has a persistent leukocytosis on labs today that is a little worse than it was a week ago. He's unable to tell me any symptoms so we'll check blood and urine cultures and a chest xray. Add on a procalcitonin as well.     Acute metabolic encephalopathy  Dementia with behavioral disturbance  -Mostly bedridden and nonverbal per admitting notes.  -required restraints for a while, but is now out of these   -Poor candidate for PEG with underlying dementia, but now is eating a little bit  -appears to be at baseline, which is quite low     Extraperitoneal perforation of the anterior wall of the  rectum  -Iatrogenic in nature. Probe removed by surgery earlier in the admission. Felt unlikely to cause infection, but something to consider with persistent leukocytosis     Anemia  -hgb stable at 8.0    Thrombocytopenia  -resolved on labs today    HTN with hypotension this admission  -On midodrine and previously pressors.    DVT prophylaxis: will start subq heparin. Check lower extremity duplexes given his bilateral edema  Code: DNR/DNI  Discussed with spouse, family, and nursing staff.   Anticipate discharge to SNU facility timing yet to be determined.      Marko Tam MD  [facility]  11/17/24  14:24 EST

## 2024-11-17 NOTE — DISCHARGE PLACEMENT REQUEST
"RonnySabas (84 y.o. Male)       Date of Birth   1940    Social Security Number       Address   [address] [place] [address]    Home Phone   [phone]    MRN   [id number]       [facility]    Marital Status                               Admission Date   11/7/24    Admission Type   Emergency    Admitting Provider   Darren Chin MD    Attending Provider   Marko Tam MD    Department, Room/Bed   [facility] [address] [place], P480/1       Discharge Date       Discharge Disposition       Discharge Destination                                 Attending Provider: Marko Tam MD    Allergies: No Known Allergies    Isolation: Contact   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)   Code Status: No CPR    Ht: 177.8 cm (70\")   Wt: 67.2 kg (148 lb 2.4 oz)    Admission Cmt: None   Principal Problem: Hypernatremia [E87.0]                   Active Insurance as of 11/7/2024       Primary Coverage       Payor Plan Insurance Group Employer/Plan Group    HUMANA MEDICARE REPLACEMENT HU[employer]NA MED ADV HMO [id number]       Payor Plan Address Payor Plan Phone Number Payor Plan Fax Number Effective Dates    [address] [address] [phone]  1/1/2018 - None Entered    [facility] [id number]         Subscriber Name Subscriber Birth Date Member ID       SABAS KELLER 1940 [id number]                     Emergency Contacts        (Rel.) Home Phone Work Phone Mobile Phone    Alka Keller (Spouse) [phone] -- --    Janay Granados (Daughter) -- -- [phone]    Sabas Keller Jr. (Son) -- -- [phone]                "

## 2024-11-17 NOTE — PLAN OF CARE
Goal Outcome Evaluation:  Plan of Care Reviewed With: patient   Patient is alert to self. He is mostly nonverbal but says yes and no. No signs of pain or discomfort.PPS 20% Will continue with plan of care.

## 2024-11-18 ENCOUNTER — APPOINTMENT (OUTPATIENT)
Dept: GENERAL RADIOLOGY | Facility: HOSPITAL | Age: 84
End: 2024-11-18
Payer: MEDICARE

## 2024-11-18 ENCOUNTER — APPOINTMENT (OUTPATIENT)
Dept: CARDIOLOGY | Facility: HOSPITAL | Age: 84
End: 2024-11-18
Payer: MEDICARE

## 2024-11-18 LAB
ALBUMIN SERPL-MCNC: 1.7 G/DL (ref 3.5–5.2)
ALBUMIN/GLOB SERPL: 0.4 G/DL
ALP SERPL-CCNC: 388 U/L (ref 39–117)
ALT SERPL W P-5'-P-CCNC: 188 U/L (ref 1–41)
ANION GAP SERPL CALCULATED.3IONS-SCNC: 10.2 MMOL/L (ref 5–15)
APTT PPP: 32.9 SECONDS (ref 22.7–35.4)
AST SERPL-CCNC: 407 U/L (ref 1–40)
BASOPHILS # BLD AUTO: 0.02 10*3/MM3 (ref 0–0.2)
BASOPHILS NFR BLD AUTO: 0.1 % (ref 0–1.5)
BH CV LOW VAS LEFT COMMON FEMORAL SPONT: 1
BH CV LOW VAS LEFT DISTAL FEMORAL SPONT: 1
BH CV LOW VAS LEFT MID FEMORAL SPONT: 1
BH CV LOW VAS LEFT POPLITEAL SPONT: 1
BH CV LOW VAS LEFT PROFUNDA FEMORAL SPONT: 1
BH CV LOW VAS LEFT PROXIMAL FEMORAL SPONT: 1
BH CV LOW VAS LEFT SAPHENOFEMORAL JUNCTION SPONT: 1
BH CV LOWER VASCULAR LEFT COMMON FEMORAL AUGMENT: NORMAL
BH CV LOWER VASCULAR LEFT COMMON FEMORAL COMPETENT: NORMAL
BH CV LOWER VASCULAR LEFT COMMON FEMORAL COMPRESS: NORMAL
BH CV LOWER VASCULAR LEFT COMMON FEMORAL PHASIC: NORMAL
BH CV LOWER VASCULAR LEFT COMMON FEMORAL SPONT: NORMAL
BH CV LOWER VASCULAR LEFT COMMON FEMORAL THROMBUS: NORMAL
BH CV LOWER VASCULAR LEFT DISTAL FEMORAL AUGMENT: NORMAL
BH CV LOWER VASCULAR LEFT DISTAL FEMORAL COMPETENT: NORMAL
BH CV LOWER VASCULAR LEFT DISTAL FEMORAL COMPRESS: NORMAL
BH CV LOWER VASCULAR LEFT DISTAL FEMORAL PHASIC: NORMAL
BH CV LOWER VASCULAR LEFT DISTAL FEMORAL SPONT: NORMAL
BH CV LOWER VASCULAR LEFT DISTAL FEMORAL THROMBUS: NORMAL
BH CV LOWER VASCULAR LEFT GREATER SAPH AK COMPRESS: NORMAL
BH CV LOWER VASCULAR LEFT GREATER SAPH BK COMPRESS: NORMAL
BH CV LOWER VASCULAR LEFT LESSER SAPH COMPRESS: NORMAL
BH CV LOWER VASCULAR LEFT MID FEMORAL AUGMENT: NORMAL
BH CV LOWER VASCULAR LEFT MID FEMORAL COMPETENT: NORMAL
BH CV LOWER VASCULAR LEFT MID FEMORAL COMPRESS: NORMAL
BH CV LOWER VASCULAR LEFT MID FEMORAL PHASIC: NORMAL
BH CV LOWER VASCULAR LEFT MID FEMORAL SPONT: NORMAL
BH CV LOWER VASCULAR LEFT MID FEMORAL THROMBUS: NORMAL
BH CV LOWER VASCULAR LEFT PERONEAL COMPRESS: NORMAL
BH CV LOWER VASCULAR LEFT POPLITEAL AUGMENT: NORMAL
BH CV LOWER VASCULAR LEFT POPLITEAL COMPETENT: NORMAL
BH CV LOWER VASCULAR LEFT POPLITEAL COMPRESS: NORMAL
BH CV LOWER VASCULAR LEFT POPLITEAL PHASIC: NORMAL
BH CV LOWER VASCULAR LEFT POPLITEAL SPONT: NORMAL
BH CV LOWER VASCULAR LEFT POPLITEAL THROMBUS: NORMAL
BH CV LOWER VASCULAR LEFT POSTERIOR TIBIAL COMPRESS: NORMAL
BH CV LOWER VASCULAR LEFT PROFUNDA FEMORAL COMPRESS: NORMAL
BH CV LOWER VASCULAR LEFT PROFUNDA FEMORAL THROMBUS: NORMAL
BH CV LOWER VASCULAR LEFT PROXIMAL FEMORAL AUGMENT: NORMAL
BH CV LOWER VASCULAR LEFT PROXIMAL FEMORAL COMPETENT: NORMAL
BH CV LOWER VASCULAR LEFT PROXIMAL FEMORAL COMPRESS: NORMAL
BH CV LOWER VASCULAR LEFT PROXIMAL FEMORAL PHASIC: NORMAL
BH CV LOWER VASCULAR LEFT PROXIMAL FEMORAL SPONT: NORMAL
BH CV LOWER VASCULAR LEFT PROXIMAL FEMORAL THROMBUS: NORMAL
BH CV LOWER VASCULAR LEFT SAPHENOFEMORAL JUNCTION COMPRESS: NORMAL
BH CV LOWER VASCULAR LEFT SAPHENOFEMORAL JUNCTION THROMBUS: NORMAL
BH CV LOWER VASCULAR RIGHT COMMON FEMORAL AUGMENT: NORMAL
BH CV LOWER VASCULAR RIGHT COMMON FEMORAL COMPETENT: NORMAL
BH CV LOWER VASCULAR RIGHT COMMON FEMORAL COMPRESS: NORMAL
BH CV LOWER VASCULAR RIGHT COMMON FEMORAL PHASIC: NORMAL
BH CV LOWER VASCULAR RIGHT COMMON FEMORAL SPONT: NORMAL
BH CV LOWER VASCULAR RIGHT DISTAL FEMORAL COMPRESS: NORMAL
BH CV LOWER VASCULAR RIGHT GREATER SAPH AK COMPRESS: NORMAL
BH CV LOWER VASCULAR RIGHT GREATER SAPH BK COMPRESS: NORMAL
BH CV LOWER VASCULAR RIGHT LESSER SAPH COMPRESS: NORMAL
BH CV LOWER VASCULAR RIGHT MID FEMORAL AUGMENT: NORMAL
BH CV LOWER VASCULAR RIGHT MID FEMORAL COMPETENT: NORMAL
BH CV LOWER VASCULAR RIGHT MID FEMORAL COMPRESS: NORMAL
BH CV LOWER VASCULAR RIGHT MID FEMORAL PHASIC: NORMAL
BH CV LOWER VASCULAR RIGHT MID FEMORAL SPONT: NORMAL
BH CV LOWER VASCULAR RIGHT PERONEAL COMPRESS: NORMAL
BH CV LOWER VASCULAR RIGHT POPLITEAL AUGMENT: NORMAL
BH CV LOWER VASCULAR RIGHT POPLITEAL COMPETENT: NORMAL
BH CV LOWER VASCULAR RIGHT POPLITEAL COMPRESS: NORMAL
BH CV LOWER VASCULAR RIGHT POPLITEAL PHASIC: NORMAL
BH CV LOWER VASCULAR RIGHT POPLITEAL SPONT: NORMAL
BH CV LOWER VASCULAR RIGHT POSTERIOR TIBIAL COMPRESS: NORMAL
BH CV LOWER VASCULAR RIGHT PROFUNDA FEMORAL COMPRESS: NORMAL
BH CV LOWER VASCULAR RIGHT PROXIMAL FEMORAL COMPRESS: NORMAL
BH CV LOWER VASCULAR RIGHT SAPHENOFEMORAL JUNCTION COMPRESS: NORMAL
BH CV VAS PRELIMINARY FINDINGS SCRIPTING: 1
BILIRUB SERPL-MCNC: 0.7 MG/DL (ref 0–1.2)
BUN SERPL-MCNC: 35 MG/DL (ref 8–23)
BUN/CREAT SERPL: 22 (ref 7–25)
CALCIUM SPEC-SCNC: 7.4 MG/DL (ref 8.6–10.5)
CHLORIDE SERPL-SCNC: 106 MMOL/L (ref 98–107)
CO2 SERPL-SCNC: 16.8 MMOL/L (ref 22–29)
CREAT SERPL-MCNC: 1.59 MG/DL (ref 0.76–1.27)
DEPRECATED RDW RBC AUTO: 49.5 FL (ref 37–54)
EGFRCR SERPLBLD CKD-EPI 2021: 43.1 ML/MIN/1.73
EOSINOPHIL # BLD AUTO: 0.02 10*3/MM3 (ref 0–0.4)
EOSINOPHIL NFR BLD AUTO: 0.1 % (ref 0.3–6.2)
ERYTHROCYTE [DISTWIDTH] IN BLOOD BY AUTOMATED COUNT: 14.6 % (ref 12.3–15.4)
GLOBULIN UR ELPH-MCNC: 3.8 GM/DL
GLUCOSE SERPL-MCNC: 97 MG/DL (ref 65–99)
HCT VFR BLD AUTO: 24 % (ref 37.5–51)
HGB BLD-MCNC: 8 G/DL (ref 13–17.7)
IMM GRANULOCYTES # BLD AUTO: 0.26 10*3/MM3 (ref 0–0.05)
IMM GRANULOCYTES NFR BLD AUTO: 1.7 % (ref 0–0.5)
INR PPP: 1.12 (ref 0.9–1.1)
LYMPHOCYTES # BLD AUTO: 1.38 10*3/MM3 (ref 0.7–3.1)
LYMPHOCYTES NFR BLD AUTO: 9.1 % (ref 19.6–45.3)
MCH RBC QN AUTO: 31.1 PG (ref 26.6–33)
MCHC RBC AUTO-ENTMCNC: 33.3 G/DL (ref 31.5–35.7)
MCV RBC AUTO: 93.4 FL (ref 79–97)
MONOCYTES # BLD AUTO: 0.42 10*3/MM3 (ref 0.1–0.9)
MONOCYTES NFR BLD AUTO: 2.8 % (ref 5–12)
NEUTROPHILS NFR BLD AUTO: 13 10*3/MM3 (ref 1.7–7)
NEUTROPHILS NFR BLD AUTO: 86.2 % (ref 42.7–76)
NRBC BLD AUTO-RTO: 0 /100 WBC (ref 0–0.2)
PLATELET # BLD AUTO: 268 10*3/MM3 (ref 140–450)
PMV BLD AUTO: 9.7 FL (ref 6–12)
POTASSIUM SERPL-SCNC: 4.2 MMOL/L (ref 3.5–5.2)
PROT SERPL-MCNC: 5.5 G/DL (ref 6–8.5)
PROTHROMBIN TIME: 14.6 SECONDS (ref 11.7–14.2)
RBC # BLD AUTO: 2.57 10*6/MM3 (ref 4.14–5.8)
SODIUM SERPL-SCNC: 133 MMOL/L (ref 136–145)
WBC NRBC COR # BLD AUTO: 15.1 10*3/MM3 (ref 3.4–10.8)

## 2024-11-18 PROCEDURE — 25010000002 HEPARIN (PORCINE) 25000-0.45 UT/250ML-% SOLUTION: Performed by: STUDENT IN AN ORGANIZED HEALTH CARE EDUCATION/TRAINING PROGRAM

## 2024-11-18 PROCEDURE — 85025 COMPLETE CBC W/AUTO DIFF WBC: CPT | Performed by: STUDENT IN AN ORGANIZED HEALTH CARE EDUCATION/TRAINING PROGRAM

## 2024-11-18 PROCEDURE — 85610 PROTHROMBIN TIME: CPT | Performed by: STUDENT IN AN ORGANIZED HEALTH CARE EDUCATION/TRAINING PROGRAM

## 2024-11-18 PROCEDURE — 85730 THROMBOPLASTIN TIME PARTIAL: CPT | Performed by: STUDENT IN AN ORGANIZED HEALTH CARE EDUCATION/TRAINING PROGRAM

## 2024-11-18 PROCEDURE — 93970 EXTREMITY STUDY: CPT | Performed by: SURGERY

## 2024-11-18 PROCEDURE — 99233 SBSQ HOSP IP/OBS HIGH 50: CPT | Performed by: SURGERY

## 2024-11-18 PROCEDURE — 74018 RADEX ABDOMEN 1 VIEW: CPT

## 2024-11-18 PROCEDURE — 25010000002 HEPARIN (PORCINE) PER 1000 UNITS: Performed by: STUDENT IN AN ORGANIZED HEALTH CARE EDUCATION/TRAINING PROGRAM

## 2024-11-18 PROCEDURE — 93970 EXTREMITY STUDY: CPT

## 2024-11-18 PROCEDURE — 80053 COMPREHEN METABOLIC PANEL: CPT | Performed by: STUDENT IN AN ORGANIZED HEALTH CARE EDUCATION/TRAINING PROGRAM

## 2024-11-18 RX ORDER — HEPARIN SODIUM 10000 [USP'U]/100ML
18 INJECTION, SOLUTION INTRAVENOUS
Status: DISCONTINUED | OUTPATIENT
Start: 2024-11-18 | End: 2024-11-21

## 2024-11-18 RX ORDER — PANTOPRAZOLE SODIUM 40 MG/10ML
40 INJECTION, POWDER, LYOPHILIZED, FOR SOLUTION INTRAVENOUS
Status: DISCONTINUED | OUTPATIENT
Start: 2024-11-18 | End: 2024-11-21

## 2024-11-18 RX ORDER — HEPARIN SODIUM 5000 [USP'U]/ML
40-80 INJECTION, SOLUTION INTRAVENOUS; SUBCUTANEOUS EVERY 6 HOURS PRN
Status: DISCONTINUED | OUTPATIENT
Start: 2024-11-18 | End: 2024-11-21

## 2024-11-18 RX ADMIN — BISACODYL 10 MG: 10 SUPPOSITORY RECTAL at 01:17

## 2024-11-18 RX ADMIN — HEPARIN SODIUM 5000 UNITS: 5000 INJECTION INTRAVENOUS; SUBCUTANEOUS at 06:22

## 2024-11-18 RX ADMIN — PANTOPRAZOLE SODIUM 40 MG: 40 INJECTION, POWDER, FOR SOLUTION INTRAVENOUS at 19:02

## 2024-11-18 RX ADMIN — HEPARIN SODIUM 18 UNITS/KG/HR: 10000 INJECTION, SOLUTION INTRAVENOUS at 20:30

## 2024-11-18 NOTE — PLAN OF CARE
Goal Outcome Evaluation: Pt remains on [address]. VSS on room air. Pt family helped him eat dinner, they reported that he started belching and told them his stomach is uncomfortable, possibly needing to have BM. Stat CT ab/pelvis ordered, pt off floor at end of shift. Family updated at bedside. WCM.       Problem: Adult Inpatient Plan of Care  Goal: Plan of Care Review  Outcome: Not Progressing  Goal: Patient-Specific Goal (Individualized)  Outcome: Not Progressing  Goal: Absence of Hospital-Acquired Illness or Injury  Outcome: Not Progressing  Intervention: Identify and Manage Fall Risk  Description: Perform standard risk assessment on admission using a validated tool or comprehensive approach appropriate to the patient; reassess fall risk frequently, with change in status or transfer to another level of care.  Communicate risk to interprofessional healthcare team; ensure fall risk visible cue.  Determine need for increased observation, equipment and environmental modification, as well as use of supportive, nonskid footwear.  Adjust safety measures to individual needs and identified risk factors.  Reinforce the importance of active participation with fall risk prevention, safety, and physical activity with the patient and family.  Perform regular intentional rounding to assess need for position change, pain assessment and personal needs, including assistance with toileting.  Recent Flowsheet Documentation  Taken 11/17/2024 1800 by Emily Bradley RN  Safety Promotion/Fall Prevention: safety round/check completed  Taken 11/17/2024 1600 by Emily Bradley RN  Safety Promotion/Fall Prevention: safety round/check completed  Taken 11/17/2024 1400 by Emily Bradley, RN  Safety Promotion/Fall Prevention: safety round/check completed  Taken 11/17/2024 1200 by Emily Bradley RN  Safety Promotion/Fall Prevention: safety round/check completed  Taken 11/17/2024 1000 by Emily Bradley, RN  Safety Promotion/Fall  Prevention: safety round/check completed  Taken 11/17/2024 0800 by Emliy Bradley RN  Safety Promotion/Fall Prevention: safety round/check completed  Intervention: Prevent Skin Injury  Description: Perform a screening for skin injury risk, such as pressure or moisture-associated skin damage on admission and at regular intervals throughout hospital stay.  Keep all areas of skin (especially folds) clean and dry.  Maintain adequate skin hydration.  Relieve and redistribute pressure and protect bony prominences and skin at risk for injury; implement measures based on patient-specific risk factors.  Match turning and repositioning schedule to clinical condition.  Encourage weight shift frequently; assist with reposition if unable to complete independently.  Float heels off bed; avoid pressure on the Achilles tendon.  Keep skin free from extended contact with medical devices.  Optimize nutrition and hydration.  Encourage functional activity and mobility, as early as tolerated.  Use aids (e.g., slide boards, mechanical lift) during transfer.  Recent Flowsheet Documentation  Taken 11/17/2024 1600 by Emily Bradley RN  Body Position:   turned   tilted   right  Taken 11/17/2024 1200 by Emily Bradley RN  Body Position:   turned   tilted   right  Taken 11/17/2024 1000 by Emily Bradley RN  Body Position:   turned   supine   legs elevated  Taken 11/17/2024 0800 by Emily Bradley RN  Body Position:   turned   tilted   left  Skin Protection:   incontinence pads utilized   skin sealant/moisture barrier applied   transparent dressing maintained  Intervention: Prevent and Manage VTE (Venous Thromboembolism) Risk  Description: Assess for VTE (venous thromboembolism) risk.  Promote early mobilization; encourage both active and passive leg exercises, if unable to ambulate.  Initiate and maintain compression or other therapy, as indicated, based on identified risk in accordance with organizational protocol and  provider order.  Recognize the patient's individual risk for bleeding before initiating pharmacologic thromboprophylaxis.  Recent Flowsheet Documentation  Taken 11/17/2024 0800 by Emily Bradley RN  VTE Prevention/Management:   bilateral   SCDs (sequential compression devices) on  Intervention: Prevent Infection  Description: Maintain skin and mucous membrane integrity; promote hand, oral and pulmonary hygiene.  Optimize fluid balance, nutrition, sleep and glycemic control to maximize infection resistance.  Identify potential sources of infection early to prevent or mitigate progression of infection (e.g., wound, lines, devices).  Evaluate ongoing need for invasive devices; remove promptly when no longer indicated.  Review vaccination status.  Recent Flowsheet Documentation  Taken 11/17/2024 1800 by Emily Bradley RN  Infection Prevention: environmental surveillance performed  Taken 11/17/2024 1600 by Emily Bradley RN  Infection Prevention: environmental surveillance performed  Taken 11/17/2024 1400 by Emily Bradley RN  Infection Prevention: environmental surveillance performed  Taken 11/17/2024 1200 by Emily Bradley RN  Infection Prevention: environmental surveillance performed  Taken 11/17/2024 1000 by Emily Bradley RN  Infection Prevention: environmental surveillance performed  Taken 11/17/2024 0800 by Emily Bradley RN  Infection Prevention: environmental surveillance performed  Goal: Optimal Comfort and Wellbeing  Outcome: Not Progressing  Intervention: Monitor Pain and Promote Comfort  Description: Assess pain level, treatment efficacy and patient response at regular intervals using a consistent pain scale.  Consider the presence and impact of preexisting chronic pain.  Encourage patient and caregiver involvement in pain assessment, interventions and safety measures.  Promote activity; balance with sleep and rest to enhance healing.  Recent Flowsheet Documentation  Taken  11/17/2024 1800 by Emily Bradley RN  Pain Management Interventions: care clustered  Taken 11/17/2024 1600 by Emily Bradley RN  Pain Management Interventions: care clustered  Taken 11/17/2024 1400 by Emily Bradley RN  Pain Management Interventions: care clustered  Taken 11/17/2024 1200 by Emily Bradley RN  Pain Management Interventions: care clustered  Taken 11/17/2024 1000 by Emily Bradley RN  Pain Management Interventions: care clustered  Taken 11/17/2024 0800 by Emily Bradley RN  Pain Management Interventions:   care clustered   pillow support provided   position adjusted   quiet environment facilitated   relaxation techniques promoted  Intervention: Provide Person-Centered Care  Description: Use a family-focused approach to care; encourage support system presence and participation.  Develop trust and rapport by proactively providing information, encouraging questions, addressing concerns and offering reassurance.  Acknowledge emotional response to hospitalization.  Recognize and utilize personal coping strategies and strengths; develop goals via shared decision-making.  Honor spiritual and cultural preferences.  Recent Flowsheet Documentation  Taken 11/17/2024 0800 by Emily Bradley RN  Trust Relationship/Rapport:   care explained   choices provided   questions answered   questions encouraged   reassurance provided  Goal: Readiness for Transition of Care  Outcome: Not Progressing     Problem: Fall Injury Risk  Goal: Absence of Fall and Fall-Related Injury  Outcome: Not Progressing  Intervention: Identify and Manage Contributors  Description: Develop a fall prevention plan, considering patient-centered interventions and family/caregiver involvement; identify and address patient's facilitators and barriers.  Provide reorientation, appropriate sensory stimulation and routines with changes in mental status to decrease risk of fall.  Promote use of personal vision and auditory  aids.  Assess assistance level required for safe and effective self-care; provide support as needed, such as toileting and mobilization. For age 65 and older, implement timed toileting with assistance.  Encourage physical activity, such as performance of mobility and self-care at highest level of patient ability, multicomponent exercise program and provision of appropriate assistive devices.  If fall occurs, assess the severity of injury; implement fall injury protocol. Determine the cause and revise fall injury prevention plan.  Regularly review and advocate for medication adjustment to decrease fall risk; consider administration times, polypharmacy and age.  Balance adequate pain management with potential for oversedation.  Recent Flowsheet Documentation  Taken 11/17/2024 0800 by Emily Bradley RN  Self-Care Promotion: (total feed)   independence encouraged   BADL personal objects within reach  Intervention: Promote Injury-Free Environment  Description: Provide a safe, barrier-free environment that encourages independent activity.  Keep care area uncluttered and well-lighted.  Determine need for increased observation or monitoring.  Avoid use of devices that minimize mobility, such as restraints or indwelling urinary catheter.  Recent Flowsheet Documentation  Taken 11/17/2024 1800 by Emily Bradley RN  Safety Promotion/Fall Prevention: safety round/check completed  Taken 11/17/2024 1600 by Emily Bradley RN  Safety Promotion/Fall Prevention: safety round/check completed  Taken 11/17/2024 1400 by Emily Bradley RN  Safety Promotion/Fall Prevention: safety round/check completed  Taken 11/17/2024 1200 by Emily Bradley RN  Safety Promotion/Fall Prevention: safety round/check completed  Taken 11/17/2024 1000 by Emily Bradley RN  Safety Promotion/Fall Prevention: safety round/check completed  Taken 11/17/2024 0800 by Emily Bradley RN  Safety Promotion/Fall Prevention: safety round/check  completed     Problem: Pain Acute  Goal: Optimal Pain Control and Function  Outcome: Not Progressing  Intervention: Optimize Psychosocial Wellbeing  Description: Facilitate patient's self-control over pain by providing pain information and allowing choices in treatment.  Consider and address emotional response to pain; express compassion and reassurance.  Explore and promote use of coping strategies; address barriers to successful coping.  Evaluate and assist with psychosocial, cultural and spiritual factors impacting pain.  Assess for risk factors for developing chronic pain, such as depression, fear, pain avoidance and pain catastrophizing.  Modify pain perception using techniques, such as distraction, mindfulness, guided imagery, meditation or music.  Consider referral for ongoing coping support, such as education, relaxation training and role of thoughts.  Recent Flowsheet Documentation  Taken 11/17/2024 1800 by Emily Bradley RN  Diversional Activities: television  Taken 11/17/2024 1600 by Emily Bradley RN  Diversional Activities: television  Taken 11/17/2024 1400 by Emily Bradley RN  Diversional Activities: television  Taken 11/17/2024 1200 by Emily Bradley RN  Diversional Activities: television  Taken 11/17/2024 1000 by Emily Bradley RN  Diversional Activities: television  Taken 11/17/2024 0800 by Emily Bradley RN  Diversional Activities: television  Intervention: Develop Pain Management Plan  Description: Acknowledge patient as the expert in pain self-management.  Use a consistent, validated tool for pain assessment; include function and quality of life.  Evaluate risk for opioid use and dependence.  Set pain management goals; determine acceptable level of discomfort to allow for maximal functioning.  Determine mutually agreed-upon pain management plan, including both pharmacologic and nonpharmacologic measures; integrate management of chronic (persistent) pain.  Identify and  integrate past successful treatment measures, if able.  Reevaluate plan regularly.  Recent Flowsheet Documentation  Taken 11/17/2024 1800 by Emily Bradley RN  Pain Management Interventions: care clustered  Taken 11/17/2024 1600 by Emily Bradley RN  Pain Management Interventions: care clustered  Taken 11/17/2024 1400 by Emily Bradley RN  Pain Management Interventions: care clustered  Taken 11/17/2024 1200 by Emily Bradley RN  Pain Management Interventions: care clustered  Taken 11/17/2024 1000 by Emily Bradley RN  Pain Management Interventions: care clustered  Taken 11/17/2024 0800 by Emily Bradley RN  Pain Management Interventions:   care clustered   pillow support provided   position adjusted   quiet environment facilitated   relaxation techniques promoted  Intervention: Prevent or Manage Pain  Description: Evaluate pain level, effect of treatment and patient response at regular intervals.  Minimize painful stimuli; coordinate care and adjust environment (e.g., light, noise, unnecessary movement); promote sleep/rest.  Match pharmacologic analgesia to severity and type of pain mechanism (e.g., neuropathic, muscle, inflammatory); consider multimodal approach.  Provide medication at regular intervals; titrate to patient response; premedicate for painful procedures.  Manage breakthrough pain with additional doses; consider rotation or switching medication.  Monitor for signs of substance tolerance (increased dose to reach desired effect, decreased effect with same dose).  Manage medication-induced adverse events and side effects.  Provide multimodal interventions, such as physical activity, therapeutic exercise, yoga, TENS (transcutaneous electrical nerve stimulation) and manual therapy.  Train in functional activity modifications, such as body mechanics, posture, ergonomics, energy conservation and activity pacing.  Consider addition of complementary or alternative therapy, such as  acupuncture, hypnosis or therapeutic touch.  Recent Flowsheet Documentation  Taken 11/17/2024 0800 by Emily Bradley RN  Sensory Stimulation Regulation: television on  Sleep/Rest Enhancement:   consistent schedule promoted   regular sleep/rest pattern promoted   relaxation techniques promoted   therapeutic touch utilized     Problem: Skin Injury Risk Increased  Goal: Skin Health and Integrity  Outcome: Not Progressing  Intervention: Optimize Skin Protection  Description: Perform a full pressure injury risk assessment, as indicated by screening, upon admission to care unit.  Reassess skin (full inspection and injury risk, including skin temperature, consistency and color) frequently (e.g., scheduled interval, with change in condition) to provide optimal early detection and prevention.  Maintain adequate tissue perfusion (e.g., encourage fluid balance; avoid crossing legs, constrictive clothing or devices) to promote tissue oxygenation.  Maintain head of bed at lowest degree of elevation tolerated, considering medical condition and other restrictions. Use positioning supports to prevent sliding and friction. Consider low friction textiles.  Avoid positioning onto an area that remains reddened or on bony prominences.  Minimize incontinence and moisture (e.g., toileting schedule; moisture-wicking pad, diaper or incontinence collection device; skin moisture barrier).  Cleanse skin promptly and gently, when soiled, utilizing a pH-balanced cleanser.  Relieve and redistribute pressure (e.g., scheduled position changes, weight shifts, use of support surface, medical device repositioning, protective dressing application, use of positioning device, microclimate control, use of pressure-injury-monitor  Encourage increased activity, such as sitting in a chair at the bedside or early mobilization, when able to tolerate. Avoid prolonged sitting.  Recent Flowsheet Documentation  Taken 11/17/2024 1600 by Emily Bradley  RN  Head of Bed (HOB) Positioning: HOB at 30 degrees  Taken 11/17/2024 1200 by Emily Bradley RN  Head of Bed (HOB) Positioning: HOB at 30 degrees  Taken 11/17/2024 1000 by Emily Bradley RN  Head of Bed (HOB) Positioning: HOB at 20-30 degrees  Taken 11/17/2024 0800 by Emily Bradley RN  Activity Management: unable to complete activity (see comments)  Pressure Reduction Techniques:   frequent weight shift encouraged   heels elevated off bed   weight shift assistance provided   pressure points protected  Head of Bed (HOB) Positioning: HOB at 30 degrees  Pressure Reduction Devices:   specialty bed utilized   positioning supports utilized   heel offloading device utilized  Skin Protection:   incontinence pads utilized   skin sealant/moisture barrier applied   transparent dressing maintained     Problem: Comorbidity Management  Goal: Blood Pressure in Desired Range  Outcome: Not Progressing     Problem: Pain Acute  Goal: Optimal Pain Control and Function  Outcome: Not Progressing  Intervention: Optimize Psychosocial Wellbeing  Description: Facilitate patient's self-control over pain by providing pain information and allowing choices in treatment.  Consider and address emotional response to pain; express compassion and reassurance.  Explore and promote use of coping strategies; address barriers to successful coping.  Evaluate and assist with psychosocial, cultural and spiritual factors impacting pain.  Assess for risk factors for developing chronic pain, such as depression, fear, pain avoidance and pain catastrophizing.  Modify pain perception using techniques, such as distraction, mindfulness, guided imagery, meditation or music.  Consider referral for ongoing coping support, such as education, relaxation training and role of thoughts.  Recent Flowsheet Documentation  Taken 11/17/2024 1800 by Emily Bradley RN  Diversional Activities: television  Taken 11/17/2024 1600 by Emily Bradley  RN  Diversional Activities: television  Taken 11/17/2024 1400 by Emily Bradley RN  Diversional Activities: television  Taken 11/17/2024 1200 by Emily Bradley RN  Diversional Activities: television  Taken 11/17/2024 1000 by Emily Bradley RN  Diversional Activities: television  Taken 11/17/2024 0800 by Emily Bradley RN  Diversional Activities: television  Intervention: Develop Pain Management Plan  Description: Acknowledge patient as the expert in pain self-management.  Use a consistent, validated tool for pain assessment; include function and quality of life.  Evaluate risk for opioid use and dependence.  Set pain management goals; determine acceptable level of discomfort to allow for maximal functioning.  Determine mutually agreed-upon pain management plan, including both pharmacologic and nonpharmacologic measures; integrate management of chronic (persistent) pain.  Identify and integrate past successful treatment measures, if able.  Reevaluate plan regularly.  Recent Flowsheet Documentation  Taken 11/17/2024 1800 by Emily Bradley RN  Pain Management Interventions: care clustered  Taken 11/17/2024 1600 by Emily Bradley RN  Pain Management Interventions: care clustered  Taken 11/17/2024 1400 by Emily Bradley RN  Pain Management Interventions: care clustered  Taken 11/17/2024 1200 by Emily Bradley RN  Pain Management Interventions: care clustered  Taken 11/17/2024 1000 by Emily Bradley RN  Pain Management Interventions: care clustered  Taken 11/17/2024 0800 by Emily Bradley RN  Pain Management Interventions:   care clustered   pillow support provided   position adjusted   quiet environment facilitated   relaxation techniques promoted  Intervention: Prevent or Manage Pain  Description: Evaluate pain level, effect of treatment and patient response at regular intervals.  Minimize painful stimuli; coordinate care and adjust environment (e.g., light, noise, unnecessary  movement); promote sleep/rest.  Match pharmacologic analgesia to severity and type of pain mechanism (e.g., neuropathic, muscle, inflammatory); consider multimodal approach.  Provide medication at regular intervals; titrate to patient response; premedicate for painful procedures.  Manage breakthrough pain with additional doses; consider rotation or switching medication.  Monitor for signs of substance tolerance (increased dose to reach desired effect, decreased effect with same dose).  Manage medication-induced adverse events and side effects.  Provide multimodal interventions, such as physical activity, therapeutic exercise, yoga, TENS (transcutaneous electrical nerve stimulation) and manual therapy.  Train in functional activity modifications, such as body mechanics, posture, ergonomics, energy conservation and activity pacing.  Consider addition of complementary or alternative therapy, such as acupuncture, hypnosis or therapeutic touch.  Recent Flowsheet Documentation  Taken 11/17/2024 0800 by Emily Bradley RN  Sensory Stimulation Regulation: television on  Sleep/Rest Enhancement:   consistent schedule promoted   regular sleep/rest pattern promoted   relaxation techniques promoted   therapeutic touch utilized     Problem: Skin Injury Risk Increased  Goal: Skin Health and Integrity  Outcome: Not Progressing  Intervention: Optimize Skin Protection  Description: Perform a full pressure injury risk assessment, as indicated by screening, upon admission to care unit.  Reassess skin (full inspection and injury risk, including skin temperature, consistency and color) frequently (e.g., scheduled interval, with change in condition) to provide optimal early detection and prevention.  Maintain adequate tissue perfusion (e.g., encourage fluid balance; avoid crossing legs, constrictive clothing or devices) to promote tissue oxygenation.  Maintain head of bed at lowest degree of elevation tolerated, considering medical  condition and other restrictions. Use positioning supports to prevent sliding and friction. Consider low friction textiles.  Avoid positioning onto an area that remains reddened or on bony prominences.  Minimize incontinence and moisture (e.g., toileting schedule; moisture-wicking pad, diaper or incontinence collection device; skin moisture barrier).  Cleanse skin promptly and gently, when soiled, utilizing a pH-balanced cleanser.  Relieve and redistribute pressure (e.g., scheduled position changes, weight shifts, use of support surface, medical device repositioning, protective dressing application, use of positioning device, microclimate control, use of pressure-injury-monitor  Encourage increased activity, such as sitting in a chair at the bedside or early mobilization, when able to tolerate. Avoid prolonged sitting.  Recent Flowsheet Documentation  Taken 11/17/2024 1600 by Emily Bradley RN  Head of Bed (HOB) Positioning: HOB at 30 degrees  Taken 11/17/2024 1200 by Emily Bradley RN  Head of Bed (HOB) Positioning: HOB at 30 degrees  Taken 11/17/2024 1000 by Emily Bradley RN  Head of Bed (HOB) Positioning: HOB at 20-30 degrees  Taken 11/17/2024 0800 by Emily Bradley RN  Activity Management: unable to complete activity (see comments)  Pressure Reduction Techniques:   frequent weight shift encouraged   heels elevated off bed   weight shift assistance provided   pressure points protected  Head of Bed (HOB) Positioning: HOB at 30 degrees  Pressure Reduction Devices:   specialty bed utilized   positioning supports utilized   heel offloading device utilized  Skin Protection:   incontinence pads utilized   skin sealant/moisture barrier applied   transparent dressing maintained     Problem: Skin Injury Risk Increased  Goal: Skin Health and Integrity  Intervention: Optimize Skin Protection  Description: Perform a full pressure injury risk assessment, as indicated by screening, upon admission to care  unit.  Reassess skin (full inspection and injury risk, including skin temperature, consistency and color) frequently (e.g., scheduled interval, with change in condition) to provide optimal early detection and prevention.  Maintain adequate tissue perfusion (e.g., encourage fluid balance; avoid crossing legs, constrictive clothing or devices) to promote tissue oxygenation.  Maintain head of bed at lowest degree of elevation tolerated, considering medical condition and other restrictions. Use positioning supports to prevent sliding and friction. Consider low friction textiles.  Avoid positioning onto an area that remains reddened or on bony prominences.  Minimize incontinence and moisture (e.g., toileting schedule; moisture-wicking pad, diaper or incontinence collection device; skin moisture barrier).  Cleanse skin promptly and gently, when soiled, utilizing a pH-balanced cleanser.  Relieve and redistribute pressure (e.g., scheduled position changes, weight shifts, use of support surface, medical device repositioning, protective dressing application, use of positioning device, microclimate control, use of pressure-injury-monitor  Encourage increased activity, such as sitting in a chair at the bedside or early mobilization, when able to tolerate. Avoid prolonged sitting.  Recent Flowsheet Documentation  Taken 11/17/2024 1600 by Emily Bradley RN  Head of Bed (HOB) Positioning: HOB at 30 degrees  Taken 11/17/2024 1200 by Emily Bradley RN  Head of Bed (HOB) Positioning: HOB at 30 degrees  Taken 11/17/2024 1000 by Emily Bradley RN  Head of Bed (HOB) Positioning: HOB at 20-30 degrees  Taken 11/17/2024 0800 by Emily Bradley RN  Activity Management: unable to complete activity (see comments)  Pressure Reduction Techniques:   frequent weight shift encouraged   heels elevated off bed   weight shift assistance provided   pressure points protected  Head of Bed (HOB) Positioning: HOB at 30 degrees  Pressure  Reduction Devices:   specialty bed utilized   positioning supports utilized   heel offloading device utilized  Skin Protection:   incontinence pads utilized   skin sealant/moisture barrier applied   transparent dressing maintained

## 2024-11-18 NOTE — PROGRESS NOTES
Name: Sabas Jefferson ADMIT: 2024   : 1940  PCP: Koby Holbrook MD    MRN: [id number] LOS: 11 days   AGE/SEX: 84 y.o. male  ROOM: 80/     Subjective   Subjective     Patient resting in bed, goals of care changed yesterday from comfort measures to DNR/DNI-FULL TREATMENT- an XR was obtained which showed possible pneumoperitoneum- CT ordered which did not show pneumoperitoneum but did show severely distended stomach and portion of small bowel more distended than previously. NGT placed overnight to LWS with 950cc immediate output.   RN this morning reports pt had 1x BM with some bright red blood but also noted maged to be dark.        Objective   Objective   Vital Signs  Temp:  [97.8 °F (36.6 °C)-98.7 °F (37.1 °C)] 97.8 °F (36.6 °C)  Heart Rate:  [102-112] 102  Resp:  [12-16] 12  BP: ()/(66-71) 100/68  SpO2:  [100 %] 100 %  on   ;   Device (Oxygen Therapy): room air  Body mass index is 21.26 kg/m².  Physical Exam  Constitutional:       General: He is not in acute distress.     Appearance: He is not toxic-appearing.   HENT:      Nose:      Comments: NGT in place  Cardiovascular:      Rate and Rhythm: Normal rate and regular rhythm.      Heart sounds: Normal heart sounds.   Pulmonary:      Effort: Pulmonary effort is normal.      Breath sounds: Normal breath sounds.   Abdominal:      General: Bowel sounds are normal.      Palpations: Abdomen is soft.      Comments: Abdomen soft, non-tender   Musculoskeletal:         General: No tenderness.      Right lower leg: Edema present.      Left lower leg: Edema present.   Neurological:      General: No focal deficit present.      Mental Status: He is alert. Mental status is at baseline. He is disoriented.   Psychiatric:         Mood and Affect: Mood normal.         Behavior: Behavior normal.         Results Review     I reviewed the patient's new clinical results.  Results from last 7 days   Lab Units 24  0421 24  1433   WBC 10*3/mm3  "15.10* 15.35*   HEMOGLOBIN g/dL 8.0* 8.0*   PLATELETS 10*3/mm3 268 244     Results from last 7 days   Lab Units 11/18/24  0421 11/17/24  1433   SODIUM mmol/L 133* 135*   POTASSIUM mmol/L 4.2 3.9   CHLORIDE mmol/L 106 106   CO2 mmol/L 16.8* 17.2*   BUN mg/dL 35* 34*   CREATININE mg/dL 1.59* 1.61*   GLUCOSE mg/dL 97 113*   Estimated Creatinine Clearance: 34 mL/min (A) (by C-G formula based on SCr of 1.59 mg/dL (H)).  Results from last 7 days   Lab Units 11/18/24  0421 11/17/24  1433   ALBUMIN g/dL 1.7* 1.6*   BILIRUBIN mg/dL 0.7 0.8   ALK PHOS U/L 388* 173*   AST (SGOT) U/L 407* 126*   ALT (SGPT) U/L 188* 43*     Results from last 7 days   Lab Units 11/18/24  0421 11/17/24  1433   CALCIUM mg/dL 7.4* 7.4*   ALBUMIN g/dL 1.7* 1.6*   MAGNESIUM mg/dL  --  2.2   PHOSPHORUS mg/dL  --  2.4*     Results from last 7 days   Lab Units 11/17/24  1433   PROCALCITONIN ng/mL 0.48*     No results found for: \"COVID19\"  No results found for: \"HGBA1C\", \"POCGLU\"      XR Chest 1 View  Narrative: XR CHEST 1 VW-11/17/2024     HISTORY: Leukocytosis. Tachycardia.     There is elevation of the left hemidiaphragm with moderately large  amount of air beneath the left hemidiaphragm which could be in a  distended stomach and some may be in the colon as well. It is  conceivable that free air underneath the left hemidiaphragm on this  semierect image could have this appearance. It is noted that some of the  bowel wall is better visualized than typically seen and there could be  some air outlining the bowel wall which can be seen with  pneumoperitoneum.     Mild atelectasis of the left lung base is seen. Lungs are underinflated.  Heart size is difficult to evaluate but may be mildly enlarged.     Impression: 1. Moderately large amount of air beneath the left hemidiaphragm. Is  difficult to determine if this is within distended stomach and colon  versus some pneumoperitoneum. If further evaluation is clinically  indicated follow-up upright or " decubitus radiographs or CT of the  abdomen and pelvis could be obtained.  2. FINDINGS were called to the [facility] [place] [place] nurses station     This report was finalized on 11/17/2024 10:35 PM by Dr. Moreno Hood M.D on Workstation: [id number]     CT Abdomen Pelvis Without Contrast  Narrative: CT OF THE ABDOMEN AND PELVIS WITHOUT CONTRAST 11/17/2024     HISTORY: Air beneath left hemidiaphragm on chest radiograph from today.     Spiral images were obtained from the lung bases to the symphysis pubis.  No intravenous or oral contrast was given.     There is a trace left pleural effusion with some minimal left lower lobe  atelectasis. Stomach is moderately severely distended with fluid and  air, as well as some particulate probable food material. There also is  moderate gaseous distention of the colon measuring up to 6.7 cm in the  transverse colon. Descending colon is not dilated. There is a moderate  amount of stool in the descending and sigmoid colon, as well as in the  right colon. There is a moderately dilated segment of bowel in the  leftward abdomen, more severely dilated than on the 11/08/2024 study and  this could represent short segment of dilated small bowel.     There is no evidence of pneumoperitoneum.     The liver, gallbladder, spleen and adrenals appear unremarkable, except  for hepatic cysts. Bilateral double-J ureteral stents are seen.  Nonobstructing bilateral renal stones are seen but there is a left renal  cyst.     A left hip prosthesis is seen.     Impression: 1. Moderately severely dilated stomach filled with air, fluid and  particulate debris. There also is a moderate amount of air within the  colon.  2. No evidence of pneumoperitoneum.  3. Short segment of apparently dilated small bowel in the left lower  abdomen, more severely dilated than on the 11/08/2024 study.  4. Minimal left pleural effusion with some minimal left lower lobe  atelectasis.  5. Hepatic cysts.  6. Bilateral double-J ureteral  stents with nonobstructing bilateral  renal stones and small left renal cyst.  7. Consider short-term follow-up CT of the abdomen and pelvis to assess  the stomach and dilated bowel.     Radiation dose reduction techniques were utilized, including automated  exposure control and exposure modulation based on body size.          Scheduled Medications  bisacodyl, 10 mg, Rectal, Daily  [Held by provider] heparin (porcine), 5,000 Units, Subcutaneous, Q8H  midodrine, 5 mg, Oral, TID AC  pantoprazole, 40 mg, Intravenous, BID AC    Infusions   Diet  NPO Diet NPO Type: Strict NPO       Assessment/Plan     Active Hospital Problems    Diagnosis  POA    **Hypernatremia [E87.0]  Yes    LEVON (acute kidney injury) [N17.9]  Yes    Acute metabolic encephalopathy [G93.41]  Yes    UTI (urinary tract infection) due to urinary indwelling catheter [T83.511A, N39.0]  Yes    Severe sepsis [A41.9, R65.20]  Yes    Elevated troponin [R79.89]  Yes    Anemia [D64.9]  Yes    Thrombocytopenia [D69.6]  Yes    Perforation of rectum [K63.1]  Unknown    Dementia with behavioral disturbance [F03.918]  Yes    Severe protein-calorie malnutrition [E43]  Yes    Chronic kidney disease, 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       84 y.o. male admitted with Hypernatremia.    Mr. Jefferson is a 84 y.o. male with a history of Alzheimer's dementia, CKD, chronic Kim catheter and hypertension that presented to the hospital with altered mental status.  He hadn't eaten much prior to admission and was not acting like himself.  He is mostly bedridden at baseline and was found to be altered with low blood pressure on arrival.  He was reported to also be nonverbal with severe dementia.  He was hypernatremic with a sodium of 172 and given IV fluids for hypotension and concerns for sepsis.  He was admitted to the ICU and nephrology was consulted.  He was suspected to have severe sepsis  secondary to UTI in the setting of chronic indwelling catheter.  He was placed on empiric antibiotics.  CT A/P was obtained showing a rectal probe going through the anterior wall of the rectum and located over the left presacral space.  General surgery was consulted and the rectal probe was removed.  He was weaned off pressors and his sodium levels improved.  He was cleared to move out of the ICU on 11/10 and [facility] was asked to assume care. Ultimately, it was felt that the patient would require a feeding tube, but the family didn't feel this was consistent with the patient's wishes and so he was transitioned to comfort care. All therapies not for comfort except midodrine have been discontinued and the palliative care order set was initiated on 11/11/24. He hasn't been requiring many prn medications for symptom management and so on 11/17/24, the patient's family decided to reverse his goals of care to DNR/DNI/full treatment.    Gastric distention/small bowel dilation  - XR 11/17 with possible pneumoperitoneum- CT ordered which did not show pneumoperitoneum but did show severely distended stomach and portion of small bowel more distended than previously. NGT placed overnight to LWS with 950cc immediate output  - General surgery re-consulted this am; pt placed in restraints to prevent pulling at NG tube    Elevated liver enzymes  - previously normal earlier this admission, may be related to meds, hypotension  - check RUQ US    Severe hypernatremia  LEVON on CKD2  -prerenal levon due to dehydration  -LEVON was improving with IVF on last check  -hypernatremia has resolved with D5w  -creatinine is down to 1.61 today from 2.49 on 11/11/24  -he appears mostly euvolemic to a little hypervolemic (lower extremity edema), so I don't think any additional therapies are indicated at the moment and would just trend his creatinine for now     Concern for UTI due to chronic indwelling catheter causing septic shock  -Treated with 3 days of  Cefepime.   -Urine culture grew 50,000 Enterococcus faecium, VRE and Steptococcus ovis-> felt contaminant by pulmonology.  -his shock was more likely hypovolemic in nature since no infection was clearly identified   -he has a persistent leukocytosis on labs that is a little worse than it was a week ago. He's unable to tell me any symptoms- blood and urine cx colected 11/17 are pending; CXR negative; procal only slightly above normal in the setting of CKD     Acute metabolic encephalopathy  Dementia with behavioral disturbance  -Mostly bedridden and nonverbal per admitting notes.  -intermittent restrained   -Poor candidate for PEG with underlying dementia, but now is eating a little bit  -appears to be at baseline, which is quite low     Extraperitoneal perforation of the anterior wall of the rectum  -Iatrogenic in nature. Probe removed by surgery earlier in the admission. Felt unlikely to cause infection, but something to consider with persistent leukocytosis  - No longer seen on repeat CT from 11/17     Anemia  -hgb stable at 8.0    Thrombocytopenia  -resolved on labs today    HTN with hypotension this admission  -On midodrine and previously pressors.    DVT prophylaxis: SCDs; hold SQ heparin given blood per rectum this am; Check lower extremity duplexes given his bilateral edema- duplexes pending  Code: DNR/DNI  Discussed with spouse, family, and nursing staff.   Anticipate discharge to SNU facility timing yet to be determined.      Lillie Latif MD  [facility]  11/18/24  09:29 EST        Addendum:   Paged by RN- duplex showed extensive L sided DVT (common femoral, deep femoral, proximal femoral, mid femoral, distal femoral and popliteal veins) - called and discussed with dtkendra Gustafson about plan to start heparin gtt/bleeding risk/plans to check hgb more frequently.

## 2024-11-18 NOTE — PROGRESS NOTES
Discharge Planning Assessment  [facility]     Patient Name: Sabas Jefferson  MRN: [id number]  Today's Date: 11/18/2024    Admit Date: 11/7/2024    Plan: SNF transitioning to LTC pending for [facility], pt would need insurance precert   Discharge Needs Assessment    No documentation.                  Discharge Plan       Row Name 11/18/24 1054       Plan    Plan Comments Sent to [facility] again this AM so that they would let me know if you can accept.   The patient may need down the road a medicaid bed. The patient has a family member that works there. Per RN/Tiesha the goals of care changed yesterday and they do not want the patient to be palliative. CCP will continue to follow for any needs. BRIA Bethea RN, CCP.                  Continued Care and Services - Admitted Since 11/7/2024       Destination       Service Provider Request Status Services Address Phone Fax Patient Preferred    [facility] Pending - Request Sent -- [address] [place] [id number] [phone] --                  Expected Discharge Date and Time       Expected Discharge Date Expected Discharge Time    Nov 18, 2024            Demographic Summary    No documentation.                  Functional Status    No documentation.                  Psychosocial    No documentation.                  Abuse/Neglect    No documentation.                  Legal    No documentation.                  Substance Abuse    No documentation.                  Patient Forms    No documentation.                     Beata Bethea, RN

## 2024-11-18 NOTE — PLAN OF CARE
Goal Outcome Evaluation:  Plan of Care Reviewed With: patient      Patient placed in soft wrist restraints due to him pulling out two ng tubes. His children notified of restraint placement and voiced understanding of restraint need.

## 2024-11-18 NOTE — PROGRESS NOTES
Progress Note    Chief Complaint   Patient presents with    Altered Mental Status       S: I was called again to reevaluate this patient secondary to abnormal CT scan of the abdomen and pelvis.  The patient was on palliative care and hospice unit for several days after he was deemed poor candidate for continue aggressive medical therapy by medical team with family agreement.  Family now agrees that while on hospice he was showing signs of improvement.  He developed abdominal pain nausea and vomiting.  He underwent abdominal x-ray that showed gastric distention.  He was transferred back to full care.  He underwent CT scan of the abdomen and pelvis that show evidence of gastric distention as well as gas and stool throughout the colon with evidence of constipation.  I saw initially the patient on 11/8/2024 because he was found to have a rectal temperature probably had perforated through anterior wall of the rectum.  He had the probe removal he did not have any more problems.  He was elevating diet until yesterday.  Patient has had bloody bowel movement as per chart but I cannot confirm this with the patient because of his dementia.  He denies any abdominal pain nausea or vomiting.  He has an NG tube that has minimal output.  His past medical history is somehow questionable because this is not able to be verified by his daughter.  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  He had CT scan of the abdomen and pelvis on 11/8/2024 that showed evidence of prior anastomosis in the small bowel.  He had CT scan of the abdomen and pelvis yesterday that showed massive distention of the stomach with debris.  There is dilation of segment of small bowel and an anastomosis in the left upper quadrant.  There is focally distended small bowel at the level of the anastomosis.  There is colon filled with stool.       O:BP 97/83 (BP Location: Left arm, Patient Position: Lying)   Pulse (!)  "124   Temp 97.5 °F (36.4 °C) (Oral)   Resp 16   Ht 177.8 cm (70\")   Wt 67.2 kg (148 lb 2.4 oz)   SpO2 100%   BMI 21.26 kg/m²   Body mass index is 21.26 kg/m².    I/O last 3 completed shifts:  In: 240 [P.O.:240]  Out: 1800 [Urine:850; Emesis/NG output:950]  No intake/output data recorded.      GENERAL: Chronically ill-appearing, no distress  HEENT: normochephalic, atraumatic, NG tube in place with clear fluid  CHEST: Breathing comfortable  CARDIAC: regular rate and rhythm    ABDOMEN: Abdomen soft, does not seem to be tender neither distended, midline incision well-healed  EXTREMITIES: no cyanosis, clubbing or edema    SKIN: Warm and moist, no rashes  BMI is within normal parameters. No other follow-up for BMI required.      Results from last 7 days   Lab Units 11/18/24  0421   WBC 10*3/mm3 15.10*   HEMOGLOBIN g/dL 8.0*   HEMATOCRIT % 24.0*   PLATELETS 10*3/mm3 268     Results from last 7 days   Lab Units 11/18/24  0421 11/17/24  1433   SODIUM mmol/L 133* 135*   POTASSIUM mmol/L 4.2 3.9   CHLORIDE mmol/L 106 106   CO2 mmol/L 16.8* 17.2*   BUN mg/dL 35* 34*   CREATININE mg/dL 1.59* 1.61*   CALCIUM mg/dL 7.4* 7.4*   BILIRUBIN mg/dL 0.7 0.8   ALK PHOS U/L 388* 173*   ALT (SGPT) U/L 188* 43*   AST (SGOT) U/L 407* 126*   GLUCOSE mg/dL 97 113*       ROS:   Unable to obtain from the patient due to dementia    DIET: N.p.o.    CT scan abdomen pelvis as described above plus questionable calcification at the distal common bile duct    A/P: 84 y.o. male with history of gastric bypass status post reversal now with gastric distention, colonic distention, large amount of stool throughout the colon.  He does not seem to have any pain.  He has gastric distention as well as gaseous distention of the colon, constipation.  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   Distention of the colon and constipation, " he has several risk factors for chronic constipation, impaction and chronic ileus due to multiple medical conditions, chronic disease, bedbound and dementia.   He has elevation of LFTs without elevation of bilirubin..  Patient with advanced dementia, poor candidate for any type of surgical intervention if he will will develop any type of intra-abdominal issues.  His albumin is 1.7 and he has chronic malnutrition.    I discussed with the family about further step and specifically about how aggressive they want us to be in working him up as well as recommending surgical intervention due to his frail state and the fact that he was just on hospice.  His daughter Janay tells me that they would like for him to have full medical management and that they will decide about having more aggressive intervention if needed tomorrow when she discussed with her family.    We will see what happened with his liver enzymes tomorrow.  If persistent elevation then he will need to have MRCP.  Continue NG to suction  Start Dulcolax suppositories daily  Abdominal x-ray in the morning, may need to have small bowel follow-through if distention persists    Discussed with daughter Janay and nursing staff    Ron Fierro MD  General, Minimally Invasive and Endoscopic Surgery  [facility]    [address], [address]  [place], [place], [address]  P: [phone]  F: [phone]

## 2024-11-19 ENCOUNTER — APPOINTMENT (OUTPATIENT)
Dept: GENERAL RADIOLOGY | Facility: HOSPITAL | Age: 84
End: 2024-11-19
Payer: MEDICARE

## 2024-11-19 LAB
ALBUMIN SERPL-MCNC: 1.9 G/DL (ref 3.5–5.2)
ALBUMIN/GLOB SERPL: 0.5 G/DL
ALP SERPL-CCNC: 386 U/L (ref 39–117)
ALT SERPL W P-5'-P-CCNC: 151 U/L (ref 1–41)
ANION GAP SERPL CALCULATED.3IONS-SCNC: 14 MMOL/L (ref 5–15)
APTT PPP: 32 SECONDS (ref 22.7–35.4)
APTT PPP: 32.2 SECONDS (ref 22.7–35.4)
APTT PPP: >200 SECONDS (ref 22.7–35.4)
AST SERPL-CCNC: 125 U/L (ref 1–40)
BASOPHILS # BLD AUTO: 0.04 10*3/MM3 (ref 0–0.2)
BASOPHILS NFR BLD AUTO: 0.2 % (ref 0–1.5)
BILIRUB SERPL-MCNC: 0.5 MG/DL (ref 0–1.2)
BUN SERPL-MCNC: 34 MG/DL (ref 8–23)
BUN/CREAT SERPL: 21.5 (ref 7–25)
CALCIUM SPEC-SCNC: 7.9 MG/DL (ref 8.6–10.5)
CHLORIDE SERPL-SCNC: 108 MMOL/L (ref 98–107)
CO2 SERPL-SCNC: 15 MMOL/L (ref 22–29)
CREAT SERPL-MCNC: 1.58 MG/DL (ref 0.76–1.27)
DEPRECATED RDW RBC AUTO: 50.6 FL (ref 37–54)
EGFRCR SERPLBLD CKD-EPI 2021: 43.4 ML/MIN/1.73
EOSINOPHIL # BLD AUTO: 0.03 10*3/MM3 (ref 0–0.4)
EOSINOPHIL NFR BLD AUTO: 0.2 % (ref 0.3–6.2)
ERYTHROCYTE [DISTWIDTH] IN BLOOD BY AUTOMATED COUNT: 14.7 % (ref 12.3–15.4)
GLOBULIN UR ELPH-MCNC: 3.9 GM/DL
GLUCOSE BLDC GLUCOMTR-MCNC: 92 MG/DL (ref 70–130)
GLUCOSE SERPL-MCNC: 91 MG/DL (ref 65–99)
HCT VFR BLD AUTO: 23.8 % (ref 37.5–51)
HCT VFR BLD AUTO: 25.3 % (ref 37.5–51)
HCT VFR BLD AUTO: 26.7 % (ref 37.5–51)
HCT VFR BLD AUTO: 26.9 % (ref 37.5–51)
HGB BLD-MCNC: 7.9 G/DL (ref 13–17.7)
HGB BLD-MCNC: 8.3 G/DL (ref 13–17.7)
HGB BLD-MCNC: 8.4 G/DL (ref 13–17.7)
HGB BLD-MCNC: 8.8 G/DL (ref 13–17.7)
IMM GRANULOCYTES # BLD AUTO: 0.3 10*3/MM3 (ref 0–0.05)
IMM GRANULOCYTES NFR BLD AUTO: 1.7 % (ref 0–0.5)
LYMPHOCYTES # BLD AUTO: 0.89 10*3/MM3 (ref 0.7–3.1)
LYMPHOCYTES NFR BLD AUTO: 5.1 % (ref 19.6–45.3)
MCH RBC QN AUTO: 29.8 PG (ref 26.6–33)
MCHC RBC AUTO-ENTMCNC: 31.2 G/DL (ref 31.5–35.7)
MCV RBC AUTO: 95.4 FL (ref 79–97)
MONOCYTES # BLD AUTO: 0.6 10*3/MM3 (ref 0.1–0.9)
MONOCYTES NFR BLD AUTO: 3.4 % (ref 5–12)
NEUTROPHILS NFR BLD AUTO: 15.65 10*3/MM3 (ref 1.7–7)
NEUTROPHILS NFR BLD AUTO: 89.4 % (ref 42.7–76)
NRBC BLD AUTO-RTO: 0 /100 WBC (ref 0–0.2)
PLATELET # BLD AUTO: 343 10*3/MM3 (ref 140–450)
PMV BLD AUTO: 9.8 FL (ref 6–12)
POTASSIUM SERPL-SCNC: 4 MMOL/L (ref 3.5–5.2)
PROT SERPL-MCNC: 5.8 G/DL (ref 6–8.5)
RBC # BLD AUTO: 2.82 10*6/MM3 (ref 4.14–5.8)
SODIUM SERPL-SCNC: 137 MMOL/L (ref 136–145)
WBC NRBC COR # BLD AUTO: 17.51 10*3/MM3 (ref 3.4–10.8)

## 2024-11-19 PROCEDURE — 85025 COMPLETE CBC W/AUTO DIFF WBC: CPT | Performed by: STUDENT IN AN ORGANIZED HEALTH CARE EDUCATION/TRAINING PROGRAM

## 2024-11-19 PROCEDURE — 85014 HEMATOCRIT: CPT | Performed by: STUDENT IN AN ORGANIZED HEALTH CARE EDUCATION/TRAINING PROGRAM

## 2024-11-19 PROCEDURE — 85018 HEMOGLOBIN: CPT | Performed by: STUDENT IN AN ORGANIZED HEALTH CARE EDUCATION/TRAINING PROGRAM

## 2024-11-19 PROCEDURE — 85730 THROMBOPLASTIN TIME PARTIAL: CPT | Performed by: STUDENT IN AN ORGANIZED HEALTH CARE EDUCATION/TRAINING PROGRAM

## 2024-11-19 PROCEDURE — 25010000002 HEPARIN (PORCINE) PER 1000 UNITS: Performed by: STUDENT IN AN ORGANIZED HEALTH CARE EDUCATION/TRAINING PROGRAM

## 2024-11-19 PROCEDURE — 25010000002 HEPARIN (PORCINE) 25000-0.45 UT/250ML-% SOLUTION: Performed by: STUDENT IN AN ORGANIZED HEALTH CARE EDUCATION/TRAINING PROGRAM

## 2024-11-19 PROCEDURE — 99232 SBSQ HOSP IP/OBS MODERATE 35: CPT | Performed by: SURGERY

## 2024-11-19 PROCEDURE — 82948 REAGENT STRIP/BLOOD GLUCOSE: CPT

## 2024-11-19 PROCEDURE — 99222 1ST HOSP IP/OBS MODERATE 55: CPT | Performed by: SURGERY

## 2024-11-19 PROCEDURE — 74019 RADEX ABDOMEN 2 VIEWS: CPT

## 2024-11-19 PROCEDURE — 25810000003 SODIUM CHLORIDE 0.9 % SOLUTION: Performed by: STUDENT IN AN ORGANIZED HEALTH CARE EDUCATION/TRAINING PROGRAM

## 2024-11-19 PROCEDURE — 80053 COMPREHEN METABOLIC PANEL: CPT | Performed by: SURGERY

## 2024-11-19 RX ORDER — SODIUM CHLORIDE 9 MG/ML
75 INJECTION, SOLUTION INTRAVENOUS CONTINUOUS
Status: DISCONTINUED | OUTPATIENT
Start: 2024-11-19 | End: 2024-11-20

## 2024-11-19 RX ADMIN — HEPARIN SODIUM 5400 UNITS: 5000 INJECTION INTRAVENOUS; SUBCUTANEOUS at 10:36

## 2024-11-19 RX ADMIN — SODIUM CHLORIDE 75 ML/HR: 9 INJECTION, SOLUTION INTRAVENOUS at 17:43

## 2024-11-19 RX ADMIN — PANTOPRAZOLE SODIUM 40 MG: 40 INJECTION, POWDER, FOR SOLUTION INTRAVENOUS at 06:39

## 2024-11-19 RX ADMIN — HEPARIN SODIUM 22 UNITS/KG/HR: 10000 INJECTION, SOLUTION INTRAVENOUS at 03:47

## 2024-11-19 RX ADMIN — PANTOPRAZOLE SODIUM 40 MG: 40 INJECTION, POWDER, FOR SOLUTION INTRAVENOUS at 17:42

## 2024-11-19 RX ADMIN — ACETAMINOPHEN 650 MG: 650 SUPPOSITORY RECTAL at 22:53

## 2024-11-19 RX ADMIN — HEPARIN SODIUM 26 UNITS/KG/HR: 10000 INJECTION, SOLUTION INTRAVENOUS at 20:11

## 2024-11-19 RX ADMIN — HEPARIN SODIUM 5400 UNITS: 5000 INJECTION INTRAVENOUS; SUBCUTANEOUS at 03:46

## 2024-11-19 NOTE — NURSING NOTE
Per Dr. Latif and Teodoro with pharmacy, continue Heparin drip at previous rate now that we have IV access and recheck PTT in 6 hours.

## 2024-11-19 NOTE — NURSING NOTE
Call placed to IV RN, in need of a 2nd IV placed for IV fluids. Previous IV access required ultrasound.

## 2024-11-19 NOTE — PROGRESS NOTES
Name: Sabas Jefferson ADMIT: 2024   : 1940  PCP: Koby Holbrook MD    MRN: [id number] LOS: 12 days   AGE/SEX: 84 y.o. male  ROOM: [facility]     Subjective   Subjective   Discussed with daughter Janay yesterday about patient's right foot which appeared darker compared to his left foot, send reports that she first noticed after he had been in the ICU on pressors and was told that it was related to pressor medication.  Asked vascular to see given right foot color discrepancy/coolness.       Objective   Objective   Vital Signs  Temp:  [97.8 °F (36.6 °C)-98.9 °F (37.2 °C)] 98.3 °F (36.8 °C)  Heart Rate:  [107-131] 131  Resp:  [16-18] 18  BP: (100-122)/(69-85) 115/76  SpO2:  [95 %-100 %] 100 %  on   ;   Device (Oxygen Therapy): room air  Body mass index is 21.26 kg/m².  Physical Exam  Constitutional:       General: He is not in acute distress.     Appearance: He is not toxic-appearing.   HENT:      Nose:      Comments: NGT in place  Cardiovascular:      Rate and Rhythm: Normal rate and regular rhythm.      Heart sounds: Normal heart sounds.   Pulmonary:      Effort: Pulmonary effort is normal.      Breath sounds: Normal breath sounds.   Abdominal:      General: Bowel sounds are normal.      Palpations: Abdomen is soft.      Comments: Abdomen soft, non-tender   Musculoskeletal:         General: No tenderness.      Right lower leg: Edema present.      Left lower leg: Edema present.   Skin:     Comments: Right foot with large blister on dorsum; coolness to the touch of toes, foot/up to ankle   Neurological:      General: No focal deficit present.      Mental Status: He is alert. Mental status is at baseline. He is disoriented.   Psychiatric:         Mood and Affect: Mood normal.         Behavior: Behavior normal.         Results Review     I reviewed the patient's new clinical results.  Results from last 7 days   Lab Units 24  0946 24  0235 24  0038 24  0421 24  1433   WBC  "10*3/mm3  --  17.51*  --  15.10* 15.35*   HEMOGLOBIN g/dL 8.3* 8.4* 8.8* 8.0* 8.0*   PLATELETS 10*3/mm3  --  343  --  268 244     Results from last 7 days   Lab Units 11/19/24 0235 11/18/24 0421 11/17/24  1433   SODIUM mmol/L 137 133* 135*   POTASSIUM mmol/L 4.0 4.2 3.9   CHLORIDE mmol/L 108* 106 106   CO2 mmol/L 15.0* 16.8* 17.2*   BUN mg/dL 34* 35* 34*   CREATININE mg/dL 1.58* 1.59* 1.61*   GLUCOSE mg/dL 91 97 113*   Estimated Creatinine Clearance: 34.3 mL/min (A) (by C-G formula based on SCr of 1.58 mg/dL (H)).  Results from last 7 days   Lab Units 11/19/24 0235 11/18/24 0421 11/17/24  1433   ALBUMIN g/dL 1.9* 1.7* 1.6*   BILIRUBIN mg/dL 0.5 0.7 0.8   ALK PHOS U/L 386* 388* 173*   AST (SGOT) U/L 125* 407* 126*   ALT (SGPT) U/L 151* 188* 43*     Results from last 7 days   Lab Units 11/19/24 0235 11/18/24 0421 11/17/24  1433   CALCIUM mg/dL 7.9* 7.4* 7.4*   ALBUMIN g/dL 1.9* 1.7* 1.6*   MAGNESIUM mg/dL  --   --  2.2   PHOSPHORUS mg/dL  --   --  2.4*     Results from last 7 days   Lab Units 11/17/24  1433   PROCALCITONIN ng/mL 0.48*     No results found for: \"COVID19\"  No results found for: \"HGBA1C\", \"POCGLU\"      XR Abdomen Flat & Upright  Narrative: XR ABDOMEN FLAT AND UPRIGHT-11/19/2024     HISTORY: Follow-up gastric distention.     There is moderately large amount of bowel gas in the left upper quadrant  of the abdomen which may be within the stomach and splenic flexure of  the colon. The stomach and colon are slightly more distended than on a  previous study from yesterday but appear less severely dilated than on  the chest radiograph of 11/17/2024.     There is elevation of the left hemidiaphragm and NG tube courses into  the stomach. Bilateral double-J ureteral stents are seen. Bilateral  renal stones are seen.     Impression: 1. Moderately large amount of bowel gas in the left upper quadrant and  leftward abdomen probably in the stomach and colon. The amount of gas in  this region appears slightly " greater than on yesterday's study but  appears improved from the 11/17/2024 study after placement of the NG  tube.  2. NG tube tip thought to be within the body of the stomach. Additional  follow-up films recommended.        This report was finalized on 11/19/2024 7:34 AM by Dr. Moreno Hood M.D on Workstation: [id number]       Scheduled Medications  bisacodyl, 10 mg, Rectal, Daily  midodrine, 5 mg, Oral, TID AC  pantoprazole, 40 mg, Intravenous, BID AC    Infusions  heparin, 18 Units/kg/hr, Last Rate: 26 Units/kg/hr (11/19/24 1501)    Diet  NPO Diet NPO Type: Strict NPO       Assessment/Plan     Active Hospital Problems    Diagnosis  POA    **Hypernatremia [E87.0]  Yes    LEVON (acute kidney injury) [N17.9]  Yes    Acute metabolic encephalopathy [G93.41]  Yes    UTI (urinary tract infection) due to urinary indwelling catheter [T83.511A, N39.0]  Yes    Severe sepsis [A41.9, R65.20]  Yes    Elevated troponin [R79.89]  Yes    Anemia [D64.9]  Yes    Thrombocytopenia [D69.6]  Yes    Perforation of rectum [K63.1]  Unknown    Dementia with behavioral disturbance [F03.918]  Yes    Severe protein-calorie malnutrition [E43]  Yes    Chronic kidney disease, 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       84 y.o. male admitted with Hypernatremia.    Mr. Jefferson is a 84 y.o. male with a history of Alzheimer's dementia, CKD, chronic Kim catheter and hypertension that presented to the hospital with altered mental status.  He hadn't eaten much prior to admission and was not acting like himself.  He is mostly bedridden at baseline and was found to be altered with low blood pressure on arrival.  He was reported to also be nonverbal with severe dementia.  He was hypernatremic with a sodium of 172 and given IV fluids for hypotension and concerns for sepsis.  He was admitted to the ICU and nephrology was consulted.  He was suspected to have severe sepsis  "secondary to UTI in the setting of chronic indwelling catheter.  He was placed on empiric antibiotics.  CT A/P was obtained showing a rectal probe going through the anterior wall of the rectum and located over the left presacral space.  General surgery was consulted and the rectal probe was removed.  He was weaned off pressors and his sodium levels improved.  He was cleared to move out of the ICU on 11/10 and [facility] was asked to assume care. Ultimately, it was felt that the patient would require a feeding tube, but the family didn't feel this was consistent with the patient's wishes and so he was transitioned to comfort care. All therapies not for comfort except midodrine have been discontinued and the palliative care order set was initiated on 11/11/24. He hasn't been requiring many prn medications for symptom management and so on 11/17/24, the patient's family decided to reverse his goals of care to DNR/DNI/full treatment.    Right foot ischemia  - asked vascular to see regarding coolness of the R foot/color changes; d/w dtr Janay who reported right foot first became dark when he was in the ICU on pressors  - Dr. Garcia evaluated/I discussed with him- the foot is dead and there is no benefit to revascularization- options would be surgery with AKA or comfort measures; I called to discuss with Janay who had questions about getting her dad \"healthy\" for surgery- given his advanced dementia/overall frailty he is a poor surgical candidate and unlikely to improve; she did ask to speak to Dr. Garcia as well as her family before making any decisions  - have asked ID to weigh in to see If abx would be of any benefit given his leukocytosis    Left lower extremity DVT  - cont heparin gtt pending surgical decisions; hgb checks [id number]- stable    Gastric distention/small bowel dilation  - XR 11/17 with possible pneumoperitoneum- CT ordered which did not show pneumoperitoneum but did show severely distended stomach and portion " of small bowel more distended than previously. NGT placed to LWS with 950cc immediate output  - General surgery re-consulted this am; pt placed in restraints to prevent pulling at NG tube    Elevated liver enzymes  - previously normal earlier this admission, may be related to meds, hypotension  - LFTs improving today; if worsening would need MRCP    Severe hypernatremia  LEVON on CKD2  -prerenal levon due to dehydration  -LEVON was improving with IVF on last check  -hypernatremia has resolved with D5w  -creatinine is down to 1.61 today from 2.49 on 11/11/24  - given NPO- will start slow IVF     Concern for UTI due to chronic indwelling catheter causing septic shock  -Treated with 3 days of Cefepime.   -Urine culture grew 50,000 Enterococcus faecium, VRE and Steptococcus ovis-> felt contaminant by pulmonology.  -his shock was more likely hypovolemic in nature since no infection was clearly identified   -he has a persistent leukocytosis on labs that is a little worse than it was a week ago. He's unable to tell me any symptoms- blood and urine cx colected 11/17 are pending; CXR negative; procal only slightly above normal in the setting of CKD     Acute metabolic encephalopathy  Dementia with behavioral disturbance  -Mostly bedridden and nonverbal per admitting notes.  -intermittent restraints  -Poor candidate for PEG with underlying dementia, but now is eating a little bit  -appears to be at baseline, which is quite low     Extraperitoneal perforation of the anterior wall of the rectum  -Iatrogenic in nature. Probe removed by surgery earlier in the admission. Felt unlikely to cause infection, but something to consider with persistent leukocytosis  - No longer seen on repeat CT from 11/17     Anemia  -hgb stable at 8.0    Thrombocytopenia  -resolved on labs today    HTN with hypotension this admission  -On midodrine and previously pressors.    DVT prophylaxis: On heparin gtt for treatment of LLE dvt  Code: DNR/DNI  Discussed  "with spouse, family, and nursing staff.   Anticipate discharge to SNU facility timing yet to be determined.      Lillie Latif MD  [facility]  11/19/24  15:35 EST    Addendum:  Called to bedside per RN to discuss goals of care with wife and daughter.  Wife/Alka stated multiple times that she does not want to escalate care and that she does not want him to have an amputation.  Daughter Janya focused on patient \"getting stronger \"which she thinks will happen if he can resume a diet and then having an amputation so he can \"live another 5 years.\"  I reiterated to her multiple times that an amputation is a large surgery and would have a large recovery including postoperative pain and given his overall frailty, advanced dementia he is a poor surgical candidate.  I also again reiterated that his dementia is not going to improve.  Again the wife stated multiple times throughout his conversation that he would not want surgery.  Daughter became frustrated making statements that her mother has a bad memory and she is going to discuss with her siblings to make the decision although I have seen no evidence that the patient's wife would not be able to make medical decisions and she seems of sound mind when speaking with her.  Daughter Janay also made statements about if we are not going to do an amputation we may as well \"send him to [facility] for morphine and kill him.\"  Explained to the daughter that the point of palliative care is not to shorten the patient's life but to focus on the quality of life the patient has left knee and treat symptoms like pain and anxiety.  Ultimately I think additional goals of care discussion with the patient's children and his wife would be beneficial and I have reconsulted palliative care.   Janay and Alka are in agreement to continue all current management, focus on removing NG tube when okay with general surgery, following up with palliative care in the " next day or so, continuing heparin drip for DVT and lab checks to follow hemoglobin, confirmed with family that he would not want compressions/CPR or intubation and will remain DNR/DNI/full treatment.  Entire conversation witnessed by patient's primary nurse Marci Frederick.

## 2024-11-19 NOTE — NURSING NOTE
11/19/24 1111   Wound Right anterior   No placement date or time found.   Side: Right  Orientation: anterior  Primary Wound Type: (c) Other (comment)   Dressing Appearance dry;intact;dried drainage   Base red   Edges irregular   Drainage Characteristics/Odor serosanguineous   Drainage Amount scant   Wound Right anterior foot   No placement date or time found.   Side: Right  Orientation: anterior  Location: foot  Primary Wound Type: (c)    Dressing Appearance dry;intact;dried drainage   Base red   Edges irregular   Drainage Characteristics/Odor serosanguineous   Drainage Amount scant     WOCN received consult Large blister right foot. Unroofed blister , large amount serous drainage.  Continue to apply vaseline gauze, abd pad and roll gauze. Right foot is cold to touch. Vascular has been consulted.

## 2024-11-19 NOTE — CONSULTS
"Name: Sabas Jefferson ADMIT: 2024   : 1940  PCP: Koby Holbrook MD    MRN: [id number] LOS: 12 days   AGE/SEX: 84 y.o. male  ROOM: [address]    Patient Care Team:  Koby Holbrook MD as PCP - General (Internal Medicine)  Sylvester Sparks MD as Consulting Physician (Urology)    CC: Right lower extremity ischemia    History of Present Illness  History obtained primarily from chart review.  I discussed the patient's situation with his nurse at the bedside, and telephoned Dr. Latif who kindly took my call.  I also called the patient's daughter Janay Crowder at [phone].    84-year-old gentleman with history of Alzheimer's dementia, chronic kidney disease with kidney stones, chronic Kim catheter and recurrent urinary tract infections, admitted to the emergency room with altered mental status and severe hypernatremia.  Patient identified with bacteriuria, and CT scan of the abdomen and pelvis demonstrated renal stents and extraluminal rectal catheter.  Underwent removal and has been treated with antibiotics and seems to have recovered.  Hypernatremia was treated with hypotonic, bicarbonate-containing fluids.  Over the last 1-2 days, patient identified with right lower extremity ischemia.  He developed a large blister on the dorsal surface of the foot.  Patient does not appear to be in pain.  According to the patient's daughter he has been nonambulatory since July.  She indicates that he was having conversations with family members during her recent hospitalization.      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 No pertinent family history.  Social History     Tobacco Use    Smoking status: Former     Current packs/day: 1.00     Average packs/day: 1 pack/day for 10.0 years (10.0 ttl pk-yrs)     Types: Cigarettes    Smokeless tobacco: Never   Substance Use Topics    Alcohol use: Yes     Alcohol/week: 7.0 standard drinks of alcohol     Types: 7 Glasses of wine per week    Drug use: No     Medications Prior to Admission   Medication Sig Dispense Refill Last Dose/Taking    leuprolide acetate, 6 months, (LUPRON) 45 MG kit kit Inject  into the shoulder, thigh, or buttocks.       Multiple Vitamins-Minerals (MULTIVITAMIN ADULT PO) Take 1 tablet by mouth daily.       vitamin B-12 (CYANOCOBALAMIN) 100 MCG tablet Take 50 mcg by mouth.        bisacodyl, 10 mg, Rectal, Daily  midodrine, 5 mg, Oral, TID AC  pantoprazole, 40 mg, Intravenous, BID AC    heparin, 18 Units/kg/hr, Last Rate: Stopped (11/19/24 1133)    Patient has no known allergies.    Vital Signs and Labs:  Vital Signs Patient Vitals for the past 24 hrs:   BP Temp Temp src Pulse Resp SpO2   11/19/24 1306 115/76 98.3 °F (36.8 °C) Oral (!) 131 18 --   11/19/24 1100 -- -- -- -- -- 100 %   11/19/24 0732 100/71 98.7 °F (37.1 °C) Oral (!) 127 18 100 %   11/19/24 0408 122/85 98.9 °F (37.2 °C) Oral (!) 130 18 95 %   11/18/24 2317 119/73 98.5 °F (36.9 °C) Oral 120 16 95 %   11/18/24 1940 110/69 97.8 °F (36.6 °C) Oral 107 16 100 %     BMI:  Body mass index is 21.26 kg/m².    Physical Exam:  Elderly, acutely and chronically ill gentleman, who does not appear to be in distress.  Abdomen soft, no involuntary guarding.  Easily palpable femoral artery pulses.  Right popliteal and pedal artery pulses not palpated.  Strong Doppler signals reported in left pedal arteries.  Cadaveric right foot to level of ankle, with ruptured blister on dorsal foot,  with complete desquamation anterior to the medial malleolus.  There are superficial wounds of the calf.  The thigh appears normal.  There are hammertoe deformities involving left toes, but the left foot and toes are warm and viable, without ulcers, calluses, fissures or blisters.      CBC    Results from last 7 days   Lab Units 11/19/24  0946 11/19/24  0235 11/19/24  0038 11/18/24  0421 11/17/24  1433   WBC 10*3/mm3  --  17.51*  --  15.10* 15.35*   HEMOGLOBIN g/dL 8.3* 8.4* 8.8* 8.0* 8.0*   PLATELETS 10*3/mm3  --  343  --  268 244     BMP   Results from last 7 days   Lab Units 11/19/24  0235 11/18/24  0421 11/17/24  1433   SODIUM mmol/L 137 133* 135*   POTASSIUM mmol/L 4.0 4.2 3.9   CHLORIDE mmol/L 108* 106 106   CO2 mmol/L 15.0* 16.8* 17.2*   BUN mg/dL 34* 35* 34*   CREATININE mg/dL 1.58* 1.59* 1.61*   GLUCOSE mg/dL 91 97 113*   MAGNESIUM mg/dL  --   --  2.2   PHOSPHORUS mg/dL  --   --  2.4*     Cr Clearance Estimated Creatinine Clearance: 34.3 mL/min (A) (by C-G formula based on SCr of 1.58 mg/dL (H)).  Infection   Results from last 7 days   Lab Units 11/17/24  1705 11/17/24  1625 11/17/24  1618 11/17/24  1433   BLOODCX   --  No growth at 24 hours No growth at 24 hours  --    URINECX  >100,000 CFU/mL Gram Positive Cocci*  --   --   --    PROCALCITONIN ng/mL  --   --   --  0.48*     Radiology(recent) XR Abdomen Flat & Upright    Result Date: 11/19/2024  1. Moderately large amount of bowel gas in the left upper quadrant and leftward abdomen probably in the stomach and colon. The amount of gas in this region appears slightly greater than on yesterday's study but appears improved from the 11/17/2024 study after placement of the NG tube. 2. NG tube tip thought to be within the body of the stomach. Additional follow-up films recommended.   This report was finalized on 11/19/2024 7:34 AM by Dr. Moreno Hood M.D on Workstation: [id number]      XR Abdomen KUB    Result Date: 11/18/2024  1. NG tube tip in the body of  the stomach.  This report was finalized on 11/18/2024 6:52 PM by Dr. Moreno Hood M.D on Workstation: [id number]      XR Chest 1 View    Result Date: 11/17/2024  1. Moderately large amount of air beneath the left hemidiaphragm. Is difficult to determine if this is within distended stomach and colon versus some pneumoperitoneum. If further evaluation is clinically indicated follow-up upright or decubitus radiographs or CT of the abdomen and pelvis could be obtained. 2. FINDINGS were called to the [address] [place] [address]  This report was finalized on 11/17/2024 10:35 PM by Dr. Moreno Hood M.D on Workstation: [id number]      CT Abdomen Pelvis Without Contrast    Result Date: 11/17/2024  1. Moderately severely dilated stomach filled with air, fluid and particulate debris. There also is a moderate amount of air within the colon. 2. No evidence of pneumoperitoneum. 3. Short segment of apparently dilated small bowel in the left lower abdomen, more severely dilated than on the 11/08/2024 study. 4. Minimal left pleural effusion with some minimal left lower lobe atelectasis. 5. Hepatic cysts. 6. Bilateral double-J ureteral stents with nonobstructing bilateral renal stones and small left renal cyst. 7. Consider short-term follow-up CT of the abdomen and pelvis to assess the stomach and dilated bowel.  Radiation dose reduction techniques were utilized, including automated exposure control and exposure modulation based on body size.        Left lower extremity venous duplex scan yesterday demonstrates extensive LLE DVT involving the common femoral, deep femoral, entire femoral and popliteal veins, with thrombosis also involving the saphenofemoral junction.    Active Hospital Problems    Diagnosis  POA    **Hypernatremia [E87.0]  Yes    LEVON (acute kidney injury) [N17.9]  Yes    Acute metabolic encephalopathy [G93.41]  Yes    UTI (urinary tract infection) due to urinary indwelling catheter [T83.511A, N39.0]  Yes     Severe sepsis [A41.9, R65.20]  Yes    Elevated troponin [R79.89]  Yes    Anemia [D64.9]  Yes    Thrombocytopenia [D69.6]  Yes    Perforation of rectum [K63.1]  Unknown    Dementia with behavioral disturbance [F03.918]  Yes    Severe protein-calorie malnutrition [E43]  Yes    Chronic kidney disease, 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     Assessment & Plan       Hypernatremia    Hypertension    Chronic kidney disease, stage II (mild)    Severe protein-calorie malnutrition    LEVON (acute kidney injury)    Dementia with behavioral disturbance    Acute metabolic encephalopathy    UTI (urinary tract infection) due to urinary indwelling catheter    Severe sepsis    Elevated troponin    Anemia    Thrombocytopenia    Perforation of rectum    84 y.o. male admitted to the hospital with mental status changes, multifactorial in etiology, possibly related to rectal perforation and severe hypernatremia.  Asked to see for right lower extremity ischemia.  On examination he has necrosis of his right foot to the ankle level, with ruptured bullae and desquamation.  There is superficial ulceration to the calf.  His thigh appears normal and he has an easily palpable right femoral pulse.  His contralateral left lower extremity is adequately perfused on the arterial side, but he has been identified with left lower extremity DVT.  The patient is seen at the bedside, is nonverbal, but he does not appear to be in any pain, even with manipulation of his limb for dressing change and physical exam.  I observe that at 1 point decision was made for comfort measures.  His foot is necrotic and there is no hope for salvage.  There is no indication for revascularization, as the foot is dead.  Management options include right above-knee amputation or consideration of palliative care.  I telephoned the patient's daughter Janay Crowder as above, and described the  "situation.  I do not feel he is a good candidate for amputation surgery, as he is unlikely to benefit in a meaningful way, but I would be willing to proceed if that is what they decide.  She discussed about wanting to \"get him healthy\" so that he would be strong enough to have surgery.  I do not anticipate that his several health problems are readily reversible to the point that we would consider him healthy.  Will follow along.  Nothing needed for the wound now.  On unfractionated heparin drip for LLE DVT.  Hold DOAC pending family decision regarding next step.    I discussed my findings and recommendations with family, nursing staff, and primary care team.    Joe Garcia MD  11/19/24, 14:48 EST    Office contact: [phone]  "

## 2024-11-19 NOTE — PLAN OF CARE
Goal Outcome Evaluation:  Plan of Care Reviewed With: patient        Progress: no change  Outcome Evaluation: Patient nonverbal. Room air. Remains strict NPO. Started on heparin gtt, currently at 22 units/kg/hr after PTT of 32.2 and bolus. NG to right nostril on continuous suction. Kim catheter in place. No BM overnight. Q2 turns. Bilateral wrist restraints continued. Contact isolation maintained. AM labs show WBC up and hgb down. Abdomen xray pending completion.

## 2024-11-19 NOTE — NURSING NOTE
RUE with edema. IV removed at this time. No redness or warmth to the site. Heparin stopped at this time. Physician notified.     Attempted IV access with no success. IV team called at this time.

## 2024-11-19 NOTE — PROGRESS NOTES
Cc: Abnormal abdominal x-ray    S: No events overnight.  Had 2 large bowel movements.  NG tube with minimal output.    O:   Vitals:    11/18/24 1940 11/18/24 2317 11/19/24 0408 11/19/24 0732   BP: 110/69 119/73 122/85 100/71   BP Location: Left arm Left arm Left arm Left arm   Patient Position: Lying Lying Lying Lying   Pulse: 107 120 (!) 130 (!) 127   Resp: 16 16 18 18   Temp: 97.8 °F (36.6 °C) 98.5 °F (36.9 °C) 98.9 °F (37.2 °C) 98.7 °F (37.1 °C)   TempSrc: Oral Oral Oral Oral   SpO2: 100% 95% 95% 100%   Weight:       Height:          Alert, chronically ill appearing  Breathing comfortable  Abdomen soft and nontender  Right lower extremity ischemic, large blister at the dorsum of the foot    White blood cell count 17,000 from 15, hemoglobin 8.3, stable  Hemoglobin 1.5, chloride 108, CO2 15    Abdominal x-ray show gaseous distention of the colon and the stomach.    Assessment and plan    84-year-old male with advanced dementia, immobility, failure to thrive, within normal imaging of the abdomen mostly with gaseous distention.  He has been having bowel function.  He has now ischemia right lower extremity heparin has been started due to infiltration of IV    His abdominal exam is extremely unreliable.  Although tachycardic he does not seem to be tender in the abdomen.  He has ischemia of his right lower extremity that can explain his elevation of white blood cell count.  NG tube with minimal output.  Continue conservative management with NG tube decompression and n.p.o.  Vascular surgery to see for right lower extremity ischemia    Ron Fierro MD, FACS  General, Minimally Invasive and Endoscopic Surgery  [facility]    [address], [address]  [place], [place], [address]  P: [phone]  F: [phone]

## 2024-11-19 NOTE — NURSING NOTE
Dr. Latif aware of tachycardia. She will review the chart and place orders if necessary. Patient has been running ST [place] base.

## 2024-11-19 NOTE — NURSING NOTE
"Pt wife and daughter at bedside. Wife voiced concerns that her and the patient would not want all of this treatment and stated that its \"too much\". Daughter does not agree with wife and stated she wants to continue all care. Pt remains non-verbal and physician was notified to discuss goals of care again. No POA paperwork noted.     After thorough discussion with physician, Dr. Latif, and this RN made aware that stepdaughter, Janay and wife are not in agree ance with goals of care. Ultimately, wife is very adamant that no further surgical treatment and continuation of complex care is necessary. She has stated this several times throughout discussion. Wife is alert and oriented and Janay feels that the patient will get stronger and will proceed with surgery of AKA. Wife in agree ance to discuss with Jus, son on her wishes moving forward.    Potential palliative re-consult.   "

## 2024-11-20 ENCOUNTER — APPOINTMENT (OUTPATIENT)
Dept: ULTRASOUND IMAGING | Facility: HOSPITAL | Age: 84
End: 2024-11-20
Payer: MEDICARE

## 2024-11-20 LAB
ALBUMIN SERPL-MCNC: 1.5 G/DL (ref 3.5–5.2)
ALP SERPL-CCNC: 250 U/L (ref 39–117)
ALT SERPL W P-5'-P-CCNC: 70 U/L (ref 1–41)
ANION GAP SERPL CALCULATED.3IONS-SCNC: 14.5 MMOL/L (ref 5–15)
APTT PPP: 117.8 SECONDS (ref 22.7–35.4)
APTT PPP: 96.2 SECONDS (ref 22.7–35.4)
APTT PPP: >200 SECONDS (ref 22.7–35.4)
ARTERIAL PATENCY WRIST A: POSITIVE
AST SERPL-CCNC: 23 U/L (ref 1–40)
ATMOSPHERIC PRESS: 745.8 MMHG
BACTERIA SPEC AEROBE CULT: ABNORMAL
BASE EXCESS BLDA CALC-SCNC: -11.2 MMOL/L (ref 0–2)
BASOPHILS # BLD AUTO: 0.04 10*3/MM3 (ref 0–0.2)
BASOPHILS NFR BLD AUTO: 0.2 % (ref 0–1.5)
BDY SITE: ABNORMAL
BILIRUB CONJ SERPL-MCNC: 0.3 MG/DL (ref 0–0.3)
BILIRUB INDIRECT SERPL-MCNC: 0.1 MG/DL
BILIRUB SERPL-MCNC: 0.4 MG/DL (ref 0–1.2)
BUN SERPL-MCNC: 37 MG/DL (ref 8–23)
BUN/CREAT SERPL: 20.6 (ref 7–25)
CALCIUM SPEC-SCNC: 8 MG/DL (ref 8.6–10.5)
CHLORIDE SERPL-SCNC: 115 MMOL/L (ref 98–107)
CO2 BLDA-SCNC: 14.1 MMOL/L (ref 23–27)
CO2 SERPL-SCNC: 11.5 MMOL/L (ref 22–29)
CREAT SERPL-MCNC: 1.8 MG/DL (ref 0.76–1.27)
D-LACTATE SERPL-SCNC: 1.1 MMOL/L (ref 0.5–2)
DEPRECATED RDW RBC AUTO: 49.8 FL (ref 37–54)
EGFRCR SERPLBLD CKD-EPI 2021: 37.1 ML/MIN/1.73
EOSINOPHIL # BLD AUTO: 0.04 10*3/MM3 (ref 0–0.4)
EOSINOPHIL NFR BLD AUTO: 0.2 % (ref 0.3–6.2)
ERYTHROCYTE [DISTWIDTH] IN BLOOD BY AUTOMATED COUNT: 14.6 % (ref 12.3–15.4)
GLUCOSE BLDC GLUCOMTR-MCNC: 106 MG/DL (ref 70–130)
GLUCOSE BLDC GLUCOMTR-MCNC: 79 MG/DL (ref 70–130)
GLUCOSE BLDC GLUCOMTR-MCNC: 81 MG/DL (ref 70–130)
GLUCOSE BLDC GLUCOMTR-MCNC: 90 MG/DL (ref 70–130)
GLUCOSE BLDC GLUCOMTR-MCNC: 97 MG/DL (ref 70–130)
GLUCOSE SERPL-MCNC: 81 MG/DL (ref 65–99)
HCO3 BLDA-SCNC: 13.4 MMOL/L (ref 22–28)
HCT VFR BLD AUTO: 23.4 % (ref 37.5–51)
HCT VFR BLD AUTO: 23.8 % (ref 37.5–51)
HCT VFR BLD AUTO: 24.4 % (ref 37.5–51)
HEMODILUTION: NO
HGB BLD-MCNC: 7.6 G/DL (ref 13–17.7)
HGB BLD-MCNC: 7.8 G/DL (ref 13–17.7)
HGB BLD-MCNC: 7.9 G/DL (ref 13–17.7)
IMM GRANULOCYTES # BLD AUTO: 0.35 10*3/MM3 (ref 0–0.05)
IMM GRANULOCYTES NFR BLD AUTO: 1.7 % (ref 0–0.5)
INHALED O2 CONCENTRATION: 21 %
LYMPHOCYTES # BLD AUTO: 1.56 10*3/MM3 (ref 0.7–3.1)
LYMPHOCYTES NFR BLD AUTO: 7.5 % (ref 19.6–45.3)
MCH RBC QN AUTO: 31.1 PG (ref 26.6–33)
MCHC RBC AUTO-ENTMCNC: 33.2 G/DL (ref 31.5–35.7)
MCV RBC AUTO: 93.7 FL (ref 79–97)
MODALITY: ABNORMAL
MONOCYTES # BLD AUTO: 0.99 10*3/MM3 (ref 0.1–0.9)
MONOCYTES NFR BLD AUTO: 4.8 % (ref 5–12)
NEUTROPHILS NFR BLD AUTO: 17.7 10*3/MM3 (ref 1.7–7)
NEUTROPHILS NFR BLD AUTO: 85.6 % (ref 42.7–76)
NRBC BLD AUTO-RTO: 0 /100 WBC (ref 0–0.2)
O2 A-A PPRESDIFF RESPIRATORY: 0.6 MMHG
PCO2 BLDA: 24.6 MM HG (ref 35–45)
PH BLDA: 7.34 PH UNITS (ref 7.35–7.45)
PLATELET # BLD AUTO: 334 10*3/MM3 (ref 140–450)
PMV BLD AUTO: 9.2 FL (ref 6–12)
PO2 BLD: 365 MM[HG] (ref 0–500)
PO2 BLDA: 76.7 MM HG (ref 80–100)
POTASSIUM SERPL-SCNC: 4.1 MMOL/L (ref 3.5–5.2)
PROT SERPL-MCNC: 5.7 G/DL (ref 6–8.5)
RBC # BLD AUTO: 2.54 10*6/MM3 (ref 4.14–5.8)
SAO2 % BLDCOA: 94.8 % (ref 92–98.5)
SODIUM SERPL-SCNC: 141 MMOL/L (ref 136–145)
TOTAL RATE: 18 BREATHS/MINUTE
WBC NRBC COR # BLD AUTO: 20.68 10*3/MM3 (ref 3.4–10.8)

## 2024-11-20 PROCEDURE — 76775 US EXAM ABDO BACK WALL LIM: CPT

## 2024-11-20 PROCEDURE — 25010000002 HEPARIN (PORCINE) 25000-0.45 UT/250ML-% SOLUTION: Performed by: STUDENT IN AN ORGANIZED HEALTH CARE EDUCATION/TRAINING PROGRAM

## 2024-11-20 PROCEDURE — 25810000003 SODIUM CHLORIDE 0.9 % SOLUTION: Performed by: INTERNAL MEDICINE

## 2024-11-20 PROCEDURE — 99233 SBSQ HOSP IP/OBS HIGH 50: CPT | Performed by: STUDENT IN AN ORGANIZED HEALTH CARE EDUCATION/TRAINING PROGRAM

## 2024-11-20 PROCEDURE — 85018 HEMOGLOBIN: CPT | Performed by: STUDENT IN AN ORGANIZED HEALTH CARE EDUCATION/TRAINING PROGRAM

## 2024-11-20 PROCEDURE — 82948 REAGENT STRIP/BLOOD GLUCOSE: CPT

## 2024-11-20 PROCEDURE — 85014 HEMATOCRIT: CPT | Performed by: STUDENT IN AN ORGANIZED HEALTH CARE EDUCATION/TRAINING PROGRAM

## 2024-11-20 PROCEDURE — 80048 BASIC METABOLIC PNL TOTAL CA: CPT | Performed by: STUDENT IN AN ORGANIZED HEALTH CARE EDUCATION/TRAINING PROGRAM

## 2024-11-20 PROCEDURE — 82803 BLOOD GASES ANY COMBINATION: CPT | Performed by: INTERNAL MEDICINE

## 2024-11-20 PROCEDURE — 85025 COMPLETE CBC W/AUTO DIFF WBC: CPT | Performed by: STUDENT IN AN ORGANIZED HEALTH CARE EDUCATION/TRAINING PROGRAM

## 2024-11-20 PROCEDURE — 85730 THROMBOPLASTIN TIME PARTIAL: CPT | Performed by: STUDENT IN AN ORGANIZED HEALTH CARE EDUCATION/TRAINING PROGRAM

## 2024-11-20 PROCEDURE — 83605 ASSAY OF LACTIC ACID: CPT | Performed by: INTERNAL MEDICINE

## 2024-11-20 PROCEDURE — 36600 WITHDRAWAL OF ARTERIAL BLOOD: CPT | Performed by: INTERNAL MEDICINE

## 2024-11-20 PROCEDURE — 99232 SBSQ HOSP IP/OBS MODERATE 35: CPT | Performed by: SURGERY

## 2024-11-20 PROCEDURE — 99223 1ST HOSP IP/OBS HIGH 75: CPT | Performed by: INTERNAL MEDICINE

## 2024-11-20 PROCEDURE — 80076 HEPATIC FUNCTION PANEL: CPT | Performed by: STUDENT IN AN ORGANIZED HEALTH CARE EDUCATION/TRAINING PROGRAM

## 2024-11-20 RX ORDER — SODIUM CHLORIDE 9 MG/ML
50 INJECTION, SOLUTION INTRAVENOUS CONTINUOUS
Status: DISCONTINUED | OUTPATIENT
Start: 2024-11-20 | End: 2024-11-21

## 2024-11-20 RX ORDER — HYDROMORPHONE HYDROCHLORIDE 1 MG/ML
0.25 INJECTION, SOLUTION INTRAMUSCULAR; INTRAVENOUS; SUBCUTANEOUS
Status: DISCONTINUED | OUTPATIENT
Start: 2024-11-20 | End: 2024-11-22 | Stop reason: HOSPADM

## 2024-11-20 RX ADMIN — SODIUM CHLORIDE 50 ML/HR: 9 INJECTION, SOLUTION INTRAVENOUS at 18:00

## 2024-11-20 RX ADMIN — PANTOPRAZOLE SODIUM 40 MG: 40 INJECTION, POWDER, FOR SOLUTION INTRAVENOUS at 10:16

## 2024-11-20 RX ADMIN — ACETAMINOPHEN 650 MG: 650 SUPPOSITORY RECTAL at 20:55

## 2024-11-20 RX ADMIN — BISACODYL 10 MG: 10 SUPPOSITORY RECTAL at 10:16

## 2024-11-20 RX ADMIN — PANTOPRAZOLE SODIUM 40 MG: 40 INJECTION, POWDER, FOR SOLUTION INTRAVENOUS at 18:00

## 2024-11-20 RX ADMIN — HEPARIN SODIUM 18 UNITS/KG/HR: 10000 INJECTION, SOLUTION INTRAVENOUS at 23:58

## 2024-11-20 NOTE — NURSING NOTE
This RN walked into the room and pt had pulled NGT out. NGT output overnight was 10ml. Call placed to general surgery

## 2024-11-20 NOTE — PROGRESS NOTES
[facility] Progress Note      Patient Name: Sabas Jefferson  : 1940  MRN: [id number]  Primary Care Physician:  Koby Holbrook MD  Date of admission: 2024    Subjective     Interval History:   Asked to re-evaluate patient we had seen earlier in the course of hospitalization for LEVON and Hypernatremia. History of indwelling gonsalez, HTN, Alzheimer's dementia.   Admitted 24 for septic shock.    Was found to have rectal probe in anterior wall of rectum, removed  Palliative care evaluated and he was transitioned, however, family changed goals of care and he is now No CPR,  Subsequently recovered renal function, was being fed via DHT.  Abdominal distention requiring NG tube.  Pulled his NG tube out this morning.  He also developed Ischemic right foot in ICU on pressors.  Now at point of vascular recommending right AKA if continued interventions planned.  He also has a left lower extremity DVT for which she is on heparin drip.  We are asked to see for falling serum bicarbonate to 11.  Earlier in hospitalization 11/10/24  had ABG  With respiratory alkalosis, bicarb 16.    Non oliguric, but all urine not recorded.  Hypotension required midodrine, but bp now stable on midodrine. Tachycardic and anemic.     Daughter and wife currently at bedside feeding the patient.  He was ordered some clear liquids however when the nurse went to give a couple of sips of water he became gurgly.  Family has given him some Jell-O and some broth this evening.  Review of Systems:   Unable to obtain.    Objective     Vitals:   Temp:  [97.5 °F (36.4 °C)-98.1 °F (36.7 °C)] 97.5 °F (36.4 °C)  Heart Rate:  [113-125] 113  Resp:  [18-20] 18  BP: (101-133)/(63-77) 125/73    Intake/Output Summary (Last 24 hours) at 2024 1704  Last data filed at 2024 0650  Gross per 24 hour   Intake 0 ml   Output 535 ml   Net -535 ml       Physical Exam:    General Appearance: Awake.  Not oriented not  verbal.  Cachectic.  Does shake his head yes to all questions.  Skin: warm and dry  HEENT: oral mucosa dry. nonicteric sclera  Neck: supple, no JVD  Lungs: Upper airway rhonchi.  No wheezing.  Heart: RRR, tachycardic.  Normal S1 and S2  Abdomen: soft, nontender, slightly distended.  Positive bowel sounds.  : Kim catheter  Extremities: Right lower extremity ischemia.  Foot cold.  Dressing in place.  Left lower extremity without edema.  Neuro: Nonverbal.  Nods his head yes to any questions.    Scheduled Meds:     bisacodyl, 10 mg, Rectal, Daily  midodrine, 5 mg, Oral, TID AC  pantoprazole, 40 mg, Intravenous, BID AC      IV Meds:   heparin, 18 Units/kg/hr, Last Rate: 18 Units/kg/hr (11/20/24 5589)        Results Reviewed:   I have personally reviewed the results from the time of this admission to 11/20/2024 17:04 EST     Results from last 7 days   Lab Units 11/20/24  1606 11/19/24  0235 11/18/24  0421   SODIUM mmol/L 141 137 133*   POTASSIUM mmol/L 4.1 4.0 4.2   CHLORIDE mmol/L 115* 108* 106   CO2 mmol/L 11.5* 15.0* 16.8*   BUN mg/dL 37* 34* 35*   CREATININE mg/dL 1.80* 1.58* 1.59*   CALCIUM mg/dL 8.0* 7.9* 7.4*   BILIRUBIN mg/dL 0.4 0.5 0.7   ALK PHOS U/L 250* 386* 388*   ALT (SGPT) U/L 70* 151* 188*   AST (SGOT) U/L 23 125* 407*   GLUCOSE mg/dL 81 91 97       Estimated Creatinine Clearance: 30.1 mL/min (A) (by C-G formula based on SCr of 1.8 mg/dL (H)).    Results from last 7 days   Lab Units 11/17/24  1433   MAGNESIUM mg/dL 2.2   PHOSPHORUS mg/dL 2.4*             Results from last 7 days   Lab Units 11/20/24  1606 11/20/24  0500 11/20/24  0007 11/19/24  1717 11/19/24  0946 11/19/24  0235 11/19/24  0038 11/18/24  0421 11/17/24  1433   WBC 10*3/mm3  --  20.68*  --   --   --  17.51*  --  15.10* 15.35*   HEMOGLOBIN g/dL 7.6* 7.9* 7.8* 7.9* 8.3* 8.4*   < > 8.0* 8.0*   PLATELETS 10*3/mm3  --  334  --   --   --  343  --  343 244    < > = values in this interval not displayed.       Results from last 7 days   Lab Units  11/18/24  1801   INR  1.12*       Assessment / Plan     ASSESSMENT:  Acute kidney injury.  Peak creatinine was 4.3 on admission.  Darvin here has been 1.5 on 11/18/2024.  Today 1.8.  Nonoliguric but not all urine recorded.  Does have known bilateral double-J ureteral stents in place.  Will give some IV fluids overnight.  (Unable to give IV bicarb due to it being unavailable unless he has severe acidemia that requires custom IV infusion).  Check an ultrasound of the kidneys in the morning.  Low serum bicarbonate with normal anion gap.  Patient ABG 11/10/24 with respiratory alkalosis so I suspect this is likely still the case, however now Could have combined respiratory alkalosis and metabolic acidosis  given his right lower extremity ischemia.  Alzheimer's dementia  Sepsis .  rectal probe in rectum with extraperitoneal perforation anterior rectal wall.  Removed by Dr. Fierro.   5. Ischemic right foot. Only surgical option is AKA per vascular surgery .  6. Hypotension, BP stable on midodrine.  7. Anemia hg 7.6.   8. Severe protein calorie malnutrition. Albumin 1.5.  I have made him n.p.o. because I think he is aspirating what is being given to him orally.  Speech evaluation ordered for tomorrow.   9. LLE DVT.  On heparin drip.  PLAN:  1.Check arterial blood gas.  Order in place to notify renal lab results.  Discussed with RN.  2.  Lactic acid level.  3.  Discussed with family at bedside.  4.  IV Fluids overnight.  5.  Check renal ultrasound in the morning.  Thank you for involving us in the care of Sabas Jefferson.  Please feel free to call with any questions.    Carmelita Navarrete MD  11/20/24  17:04 [facility]    [facility]  [phone]    Please note that portions of this note were completed with a voice recognition program.

## 2024-11-20 NOTE — PROGRESS NOTES
"Progress Note    Chief Complaint   Patient presents with    Altered Mental Status       S: There were no events overnight.  Had at least 2-3 bowel movements.  Pulled NG.    O:/70 (BP Location: Left arm, Patient Position: Lying)   Pulse 120   Temp 97.5 °F (36.4 °C) (Oral)   Resp 20   Ht 177.8 cm (70\")   Wt 67.2 kg (148 lb 2.4 oz)   SpO2 95%   BMI 21.26 kg/m²   Body mass index is 21.26 kg/m².    I/O last 3 completed shifts:  In: 0   Out: 1235 [Urine:1075; Emesis/NG output:160]  No intake/output data recorded.      GENERAL: Chronically ill-appearing, no distress  HEENT: normochephalic, atraumatic   CHEST: Breathing comfortable  Tachycardic  ABDOMEN: Abdomen soft and nontender.  Well-healed midline incision  EXTREMITIES: Right lower extremity ischemia  SKIN: Warm and moist, no rashes  BMI is within normal parameters. No other follow-up for BMI required.      Results from last 7 days   Lab Units 11/20/24  0500   WBC 10*3/mm3 20.68*   HEMOGLOBIN g/dL 7.9*   HEMATOCRIT % 23.8*   PLATELETS 10*3/mm3 334     Results from last 7 days   Lab Units 11/19/24  0235 11/18/24  0421 11/17/24  1433   SODIUM mmol/L 137 133* 135*   POTASSIUM mmol/L 4.0 4.2 3.9   CHLORIDE mmol/L 108* 106 106   CO2 mmol/L 15.0* 16.8* 17.2*   BUN mg/dL 34* 35* 34*   CREATININE mg/dL 1.58* 1.59* 1.61*   CALCIUM mg/dL 7.9* 7.4* 7.4*   BILIRUBIN mg/dL 0.5 0.7 0.8   ALK PHOS U/L 386* 388* 173*   ALT (SGPT) U/L 151* 188* 43*   AST (SGOT) U/L 125* 407* 126*   GLUCOSE mg/dL 91 97 113*       ROS:   Unable to obtain    DIET: N.p.o.    A/P: 84 y.o. male with abnormal distention of the stomach and colon likely secondary to ileus.   He is having bowel function.  Does not seem to have any abdominal pain.  He has not been throwing up despite NG tube being removed.  Liver enzymes improving, normal bilirubin.  I do not think he has a bowel obstruction but likely ileus secondary to acute on chronic illness.  Start clear liquid diet today and see how he " does.  Continue bowel regimen  No need for abdominal intervention  Very poor candidate for any type of surgical procedure.   Leukocytosis related to right lower extremity ischemia        Ron Fierro MD  General, Minimally Invasive and Endoscopic Surgery  [facility]    [address], [address]  [place], [place], [address]  P: [phone]  F: [phone]

## 2024-11-20 NOTE — CONSULTS
"Purpose of the visit was to evaluate for: goals of care/advanced care planning and support for patient/family. Spoke with MD and RN as well as family and discussed palliative care, goals of care, care options, resuscitation status, and [facility].       Assessment:  Patient is an 84 year old male with a history of dementia (minimally verbal and bedbound), CKD II, chronic anemia, severe malnutrition, HTN. He was admitted from home with concerns of SOA and poor PO intake. He was initially found to have hypernatremia and severe sepsis from UTI as well as LEVON on CKD. He was later found to have a LLE DVT and a necrotic right foot.   PPS 30%     Cultural and spiritual needs have been assessed. No needs identified at this time.      Recommendations/Plan: Continue supportive medical management of acute and chronic conditions, with no escalation of care including surgical intervention or PEG placement.      Other Comments: I spoke with with patient's spouse Alka over the phone to offer support and to discuss her goals of care for the patient. Alka confirmed that she is the patient's  legal NoK/HCS/decision maker as he has not completed a LW nor has he designated any else to make medical decisions for him. Alka shared that prior to their marriage, the patient had one child Torrie(?), that she had several of her own (which he helped her to raise but did not adopt), and they had one son together, \"Jus Dash\". Alka voiced her understanding that the children are concerned for the patient's wellbeing and are perhaps in denial about his condition, but confirmed that she is his decision maker and we are not to confer with the children on decisions. From our discussion I have no doubt that she is of sound mind, has good situational insight and clearly knows the patient's wishes. Alka recalled that the patient was previously seen by palliative care team on 11/11 and was transferred to  for comfort. After about 6 days of " "palliative care, Alka recalled that they felt his condition was improving and decided to transition their goals of care back to supportive medical intervention. Since that time, despite medical intervention for acute and chronic issues, his condition has continued to deteriorate. Alka stated that she knows the patient is at end-stage dementia, and believes that \"he is ready to be with God\". As such, she wants to provide supportive medical care with adequate symptom management at this time. She is concerned that due to his mentation he is not able to adequately express his pain, but has noticed that he says \"Oh Lord\" at times, and to her that indicates pain. She confirmed that we are to allow a natural death with no CPR, intubation or cardioversion, and that she does not wish to pursue any escalation beyond supportive care, including surgical intervention or PEG placement. She voiced her understanding that the patient is likely in his final days or weeks, and she will consider eventual transition back to end-of-life comfort care only.   Discussed with Dr. Latif and bedside RN Emily, orders received.     We will continue to follow to provide support as needed.     Bailee Monteiro, Palliative Care Referral RN      "

## 2024-11-20 NOTE — PROGRESS NOTES
Name: Sabas Jefferson ADMIT: 2024   : 1940  PCP: Koby Holbrook MD    MRN: [id number] LOS: 13 days   AGE/SEX: 84 y.o. male  ROOM:  [facility] S4/1     CC: Consult for right foot ischemia  Interval History: No acute events overnight.  I rounded on the patient twice today, at 7:30 AM and 4 PM and needed time was family around.  Attempted to call patient's wife twice at the number listed in the EMR and there was no answer.   Patient resting in bed in no acute distress.    Subjective   Subjective     Review of Systems  Objective   Objective     Vitals:   Temp:  [97.5 °F (36.4 °C)-98.1 °F (36.7 °C)] 97.5 °F (36.4 °C)  Heart Rate:  [113-125] 113  Resp:  [18-20] 18  BP: (101-133)/(63-77) 125/73    No intake/output data recorded.    Scheduled Meds:     bisacodyl, 10 mg, Rectal, Daily  midodrine, 5 mg, Oral, TID AC  pantoprazole, 40 mg, Intravenous, BID AC      IV Meds:   heparin, 18 Units/kg/hr, Last Rate: 18 Units/kg/hr (24 1549)  sodium chloride, 75 mL/hr, Last Rate: 75 mL/hr (24 1743)        Physical Exam  Pt responds to stimulation but not answering questions.   R foot frankly ischemic and developing some desquamation of the skin. Leg warm to just below the knee with progressive ischemia distally. Left foot warm.     Data Reviewed:  CBC    Results from last 7 days   Lab Units 24  1606 24  0500 24  0007 24  1717 24  0946 24  0235 24  0038 24  0421 24  1433   WBC 10*3/mm3  --  20.68*  --   --   --  17.51*  --  15.10* 15.35*   HEMOGLOBIN g/dL 7.6* 7.9* 7.8* 7.9* 8.3* 8.4* 8.8* 8.0* 8.0*   PLATELETS 10*3/mm3  --  334  --   --   --  343  --  268 244     BMP   Results from last 7 days   Lab Units 24  0235 24  0421 24  1433   SODIUM mmol/L 137 133* 135*   POTASSIUM mmol/L 4.0 4.2 3.9   CHLORIDE mmol/L 108* 106 106   CO2 mmol/L 15.0* 16.8* 17.2*   BUN mg/dL 34* 35* 34*   CREATININE mg/dL 1.58* 1.59* 1.61*   GLUCOSE mg/dL  "91 97 113*   MAGNESIUM mg/dL  --   --  2.2   PHOSPHORUS mg/dL  --   --  2.4*     Cr Clearance Estimated Creatinine Clearance: 34.3 mL/min (A) (by C-G formula based on SCr of 1.58 mg/dL (H)).  Coag   Results from last 7 days   Lab Units 11/20/24  1416 11/20/24  0500 11/19/24  2058 11/19/24  0945 11/19/24  0235 11/18/24  1801   INR   --   --   --   --   --  1.12*   APTT seconds 96.2* >200.0* >200.0* 32.0 32.2 32.9     HbA1C No results found for: \"HGBA1C\"  Blood Glucose   Glucose   Date/Time Value Ref Range Status   11/20/2024 1615 81 70 - 130 mg/dL Final   11/20/2024 1109 79 70 - 130 mg/dL Final   11/20/2024 0608 90 70 - 130 mg/dL Final   11/20/2024 0019 97 70 - 130 mg/dL Final   11/19/2024 1842 92 70 - 130 mg/dL Final     Infection   Results from last 7 days   Lab Units 11/17/24  1705 11/17/24  1625 11/17/24  1618 11/17/24  1433   BLOODCX   --  No growth at 2 days No growth at 2 days  --    URINECX  >100,000 CFU/mL Enterococcus faecium, VRE*  --   --   --    PROCALCITONIN ng/mL  --   --   --  0.48*     CMP   Results from last 7 days   Lab Units 11/19/24  0235 11/18/24  0421 11/17/24  1433   SODIUM mmol/L 137 133* 135*   POTASSIUM mmol/L 4.0 4.2 3.9   CHLORIDE mmol/L 108* 106 106   CO2 mmol/L 15.0* 16.8* 17.2*   BUN mg/dL 34* 35* 34*   CREATININE mg/dL 1.58* 1.59* 1.61*   GLUCOSE mg/dL 91 97 113*   ALBUMIN g/dL 1.9* 1.7* 1.6*   BILIRUBIN mg/dL 0.5 0.7 0.8   ALK PHOS U/L 386* 388* 173*   AST (SGOT) U/L 125* 407* 126*   ALT (SGPT) U/L 151* 188* 43*     ABG      UA    Results from last 7 days   Lab Units 11/17/24  1705   NITRITE UA  Negative   WBC UA /HPF Too Numerous to Count*   BACTERIA UA /HPF 1+*   SQUAM EPITHEL UA /HPF 7-12*   URINECX  >100,000 CFU/mL Enterococcus faecium, VRE*     ZACHERY  No results found for: \"POCMETH\", \"POCAMPHET\", \"POCBARBITUR\", \"POCBENZO\", \"POCCOCAINE\", \"POCOPIATES\", \"POCOXYCODO\", \"POCPHENCYC\", \"POCPROPOXY\", \"POCTHC\", \"POCTRICYC\"  Lysis Labs   Results from last 7 days   Lab Units 11/20/24  1606 " 11/20/24  1416 11/20/24  0500 11/20/24  0007 11/19/24  2058 11/19/24  1717 11/19/24  0946 11/19/24  0945 11/19/24  0235 11/19/24  0038 11/18/24  1801 11/18/24  0421 11/17/24  1433   INR   --   --   --   --   --   --   --   --   --   --  1.12*  --   --    APTT seconds  --  96.2* >200.0*  --  >200.0*  --   --  32.0 32.2  --  32.9  --   --    HEMOGLOBIN g/dL 7.6*  --  7.9* 7.8*  --  7.9* 8.3*  --  8.4* 8.8*  --  8.0* 8.0*   PLATELETS 10*3/mm3  --   --  334  --   --   --   --   --  343  --   --  268 244   CREATININE mg/dL  --   --   --   --   --   --   --   --  1.58*  --   --  1.59* 1.61*     Radiology(recent) XR Abdomen Flat & Upright    Result Date: 11/19/2024  1. Moderately large amount of bowel gas in the left upper quadrant and leftward abdomen probably in the stomach and colon. The amount of gas in this region appears slightly greater than on yesterday's study but appears improved from the 11/17/2024 study after placement of the NG tube. 2. NG tube tip thought to be within the body of the stomach. Additional follow-up films recommended.   This report was finalized on 11/19/2024 7:34 AM by Dr. Moreno Hood M.D on Workstation: [id number]      XR Abdomen KUB    Result Date: 11/18/2024  1. NG tube tip in the body of the stomach.  This report was finalized on 11/18/2024 6:52 PM by Dr. Moreno Hood M.D on Workstation: [id number]       Most notable findings include: Leukocytosis noted, 20k.  Hemoglobin 7.6    Active Hospital Problems:   Active Hospital Problems    Diagnosis  POA    **Hypernatremia [E87.0]  Yes    LEVON (acute kidney injury) [N17.9]  Yes    Acute metabolic encephalopathy [G93.41]  Yes    UTI (urinary tract infection) due to urinary indwelling catheter [T83.511A, N39.0]  Yes    Severe sepsis [A41.9, R65.20]  Yes    Elevated troponin [R79.89]  Yes    Anemia [D64.9]  Yes    Thrombocytopenia [D69.6]  Yes    Perforation of rectum [K63.1]  Unknown    Dementia with behavioral disturbance [F03.918]  Yes     Severe protein-calorie malnutrition [E43]  Yes    Chronic kidney disease, 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      Assessment & Plan     Assessment / Plan     84-year-old man with dementia, chronic kidney disease, hypertension, admitted with sepsis secondary to rectal perforation, previously on hospice who subsequently transitioned back to DNR/DNI/full medical care, and now has been found to have [place] stage III ischemia of his right foot.  No role for revascularization.  Patient's only surgical option from a vascular standpoint is amputation.  Considering his baseline functional status above-knee amputation is recommended.   Per my review of the EMR, Patient's family so far undecided as to whether they will return to hospice or continue to pursue aggressive medical care.  No family was at bedside today this morning or this afternoon, and patient's wife did not answer the phone.  I did not attempt to reach out to the patient's children as there appears to be some disagreement between the spouse and the children as to what patient's wishes are/goals of care, and as the patients spouse is the medical decision maker I did not think it would be beneficial to discuss options with the children without the spouse present.   If the patient does not return to hospice then he will require a right above-knee amputation.  Pursuing aggressive medical care in the absence of amputation of this ischemic foot would only lead to more pain, suffering, sickness and hospitalization for the patient.   Will continue to follow.     Talon Horner II, MD  11/20/24  16:22 EST  Available via Secure Chat during the day for non-urgent issues.  After hours and for urgent issues please call the office at [phone]

## 2024-11-20 NOTE — CONSULTS
Referring Provider: Osvaldo Griffiths MD      Subjective   History of present illness:  84-year-old with history of Alzheimer's dementia, chronic kidney disease, renal stone status post PCNL, Kim catheter admitted on 11/7 with altered mental status.  He is nonambulatory and bedridden at baseline and was found to be hyponatremic to 172 on admission with acute kidney injury.  He was started on cefepime and vancomycin, the latter stopped with negative MRSA PCR.  He ended up being treated with 3 days of cefepime and urine culture grew 50,000 Aerococcus species and Streptococcus species.  This was ultimately felt to be contaminant.  Course also complicated by iatrogenic perforation of the anterior wall of the rectum at the extraperitoneal space.  He was transferred out of the ICU and started on comfort measures but family later decided to assume medical care.  As part of this decision, he underwent evaluation by vascular surgery for ischemic right foot.  Options would be for AKA or palliative care and they did not think surgery was in his best interest so recommended palliative measures.  It seems like there is disagreement with the family over best course to proceed.      Physical Exam:   Vital Signs   Temp:  [97.5 °F (36.4 °C)-98.3 °F (36.8 °C)] 97.5 °F (36.4 °C)  Heart Rate:  [118-131] 118  Resp:  [18-20] 20  BP: (101-133)/(63-77) 133/77    GENERAL: Awake and alert, in no acute distress.   HEENT: Oropharynx is clear. Hearing is grossly normal.   EYES: . No conjunctival injection. No lid lag.   LUNGS:normal respiratory effort.   SKIN: no cutaneous eruptions in exposed areas      Results Review:  White count 20.68, hemoglobin 7.9, platelet 334  Creatinine 1.58  , , total bilirubin 1.1  11/7 blood cultures negative  11/7 urine culture 50,000 Enterococcus VCM and Streptococcus bovis  11/17 urine culture greater than 100,000 gram-positive cocci  11/17 blood cultures no growth to date   Acute left lower  extremity deep vein thrombosis noted in the common   femoral, deep femoral, proximal femoral, mid femoral, distal femoral and   popliteal.   CT abdomen pelvis with severely dilated fluid and air-filled stomach, no pneumoperitoneum, dilated small bowel, minimal left pleural effusion and stents in place.  Chest x-ray dependently interpreted: No DVT but air underneath the diaphragm  A/p  1.  Severe right foot ischemia/dry gangrene  2.  Acute kidney injury on chronic kidney disease stage II  3.  Perforation of the rectum  4.  Severe hypernatremia on admission, resolved  5.  Dementia with behavioral disturbance  6.  Acute left lower extremity DVT in the common femoral down to the popliteal  7.  Ileus  8.  Chronic Kim catheter with bacteriuria and chronic colonization  9. Leukocytosis secondary to above    Patient with leukocytosis, likely multifactorial from multiple issues including ileus, DVT, right foot ischemia.  I currently do not see a role for antibiotics.  Prognosis seems poor and will physicians recommending palliative measures.  Family deciding best course at this time.      Thank you for this consult.  We will continue to follow along and tailor antibiotics as the patient's clinical course evolves.

## 2024-11-20 NOTE — PLAN OF CARE
Goal Outcome Evaluation:  Plan of Care Reviewed With: patient        Progress: no change  Outcome Evaluation: remains on room air. chronic gonsalez. multiple bm overnight. tylenol suppository given for mild pain. q2 turns. restraints in place. NGT pulled out by patient.remains in sinus tach.

## 2024-11-20 NOTE — PROGRESS NOTES
"Nutrition Services    Patient Name:  Sabas Jefferson  YOB: 1940  MRN: [id number]  Admit Date:  11/7/2024Assessment Date:  11/20/24    NUTRITION SCREENING      Reason for Encounter Follow-up protocol  GOC- continue medical management of acute/chronic conditions and no excalation of care (surgery, PEG)  Pt pulled NG and is now trying clear liquids    Diagnosis/Problem Hypernatremia        PO Diet Diet: Liquid; Clear Liquid; Fluid Consistency: Thin (IDDSI 0)   Supplements -   PO Intake % Clears        Medications MAR reviewed by RD   Labs  Listed below, reviewed   Physical Findings Alert, disoriented, 2-4+ edema    GI Function BM 11/20, fecal incontinence   Skin Status Coccyx wound st 1, L posterior greater trochanter st 2, R anterior foot       Height  Weight  BMI  Weight Trend     Height: 177.8 cm (70\")  Weight: 67.2 kg (148 lb 2.4 oz) (11/11/24 0526)  Body mass index is 21.26 kg/m².  Stable, Gain       Nutrition Problem (PES) Inadequate oral intake related to AMS as evidence by NPO/clears .       Intervention/Plan Boost breeze TID   ADAT   Encourage oral intakes     RD to follow up per protocol.     Results from last 7 days   Lab Units 11/19/24  0235 11/18/24 0421 11/17/24  1433   SODIUM mmol/L 137 133* 135*   POTASSIUM mmol/L 4.0 4.2 3.9   CHLORIDE mmol/L 108* 106 106   CO2 mmol/L 15.0* 16.8* 17.2*   BUN mg/dL 34* 35* 34*   CREATININE mg/dL 1.58* 1.59* 1.61*   CALCIUM mg/dL 7.9* 7.4* 7.4*   BILIRUBIN mg/dL 0.5 0.7 0.8   ALK PHOS U/L 386* 388* 173*   ALT (SGPT) U/L 151* 188* 43*   AST (SGOT) U/L 125* 407* 126*   GLUCOSE mg/dL 91 97 113*     Results from last 7 days   Lab Units 11/20/24  0500 11/18/24  0421 11/17/24  1433   MAGNESIUM mg/dL  --   --  2.2   PHOSPHORUS mg/dL  --   --  2.4*   HEMOGLOBIN g/dL 7.9*   < > 8.0*   HEMATOCRIT % 23.8*   < > 23.7*    < > = values in this interval not displayed.     No results found for: \"HGBA1C\"      Electronically signed by:  Mary Lou Qureshi RD  11/20/24 12:57 EST  "

## 2024-11-20 NOTE — CASE MANAGEMENT/SOCIAL WORK
Continued Stay Note  [facility]     Patient Name: Sabas Jefferson  MRN: [id number]  Today's Date: 11/20/2024    Admit Date: 11/7/2024    Plan: TBD   Discharge Plan       Row Name 11/20/24 1052       Plan    Plan TBD    Patient/Family in Agreement with Plan yes    Plan Comments Clinicals reviewed. Noted Palliative consultd and meeting pending with family to GOC discussion. CCP continues to follow for medical readiness for DC planning. Onesimo RN/CCP                   Discharge Codes    No documentation.                 Expected Discharge Date and Time       Expected Discharge Date Expected Discharge Time    Nov 21, 2024               Lynette Feliciano RN

## 2024-11-20 NOTE — PROGRESS NOTES
"    Name: Sabas Jefferson ADMIT: 2024   : 1940  PCP: Koby Holbrook MD    MRN: [id number] LOS: 13 days   AGE/SEX: 84 y.o. male  ROOM: [facility]/     Subjective   Subjective   Patient resting in bed, NGT pulled out this morning but surgery is ok with trying clears/nt replacing NGT at this time. RN tried to give him small amount of water but he became \"gurgly\" and he was made NPO and SLP consult placed.        Objective   Objective   Vital Signs  Temp:  [97.5 °F (36.4 °C)-98.1 °F (36.7 °C)] 97.5 °F (36.4 °C)  Heart Rate:  [113-125] 113  Resp:  [18-20] 18  BP: (101-133)/(63-77) 125/73  SpO2:  [95 %-100 %] 98 %  on   ;   Device (Oxygen Therapy): room air  Body mass index is 21.26 kg/m².  Physical Exam  Constitutional:       General: He is not in acute distress.     Appearance: He is ill-appearing. He is not toxic-appearing.   Cardiovascular:      Rate and Rhythm: Regular rhythm. Tachycardia present.      Heart sounds: Normal heart sounds.   Pulmonary:      Effort: Pulmonary effort is normal.      Breath sounds: Normal breath sounds.   Abdominal:      General: Bowel sounds are normal.      Palpations: Abdomen is soft.      Comments: Abdomen soft, non-tender   Musculoskeletal:         General: No tenderness.      Right lower leg: Edema present.      Left lower leg: Edema present.   Skin:     Comments: Right foot with large blister on dorsum; coolness to the touch of toes, foot/up to ankle   Neurological:      General: No focal deficit present.      Mental Status: He is alert. Mental status is at baseline. He is disoriented.         Results Review     I reviewed the patient's new clinical results.  Results from last 7 days   Lab Units 24  0500 24  0007 24  1717 24  0946 24  0235 24  0038 24  0421 24  1433   WBC 10*3/mm3 20.68*  --   --   --  17.51*  --  15.10* 15.35*   HEMOGLOBIN g/dL 7.9* 7.8* 7.9* 8.3* 8.4*   < > 8.0* 8.0*   PLATELETS 10*3/mm3 334  --   --   " "--  343  --  879 244    < > = values in this interval not displayed.     Results from last 7 days   Lab Units 11/19/24 0235 11/18/24 0421 11/17/24  1433   SODIUM mmol/L 137 133* 135*   POTASSIUM mmol/L 4.0 4.2 3.9   CHLORIDE mmol/L 108* 106 106   CO2 mmol/L 15.0* 16.8* 17.2*   BUN mg/dL 34* 35* 34*   CREATININE mg/dL 1.58* 1.59* 1.61*   GLUCOSE mg/dL 91 97 113*   Estimated Creatinine Clearance: 34.3 mL/min (A) (by C-G formula based on SCr of 1.58 mg/dL (H)).  Results from last 7 days   Lab Units 11/19/24 0235 11/18/24 0421 11/17/24  1433   ALBUMIN g/dL 1.9* 1.7* 1.6*   BILIRUBIN mg/dL 0.5 0.7 0.8   ALK PHOS U/L 386* 388* 173*   AST (SGOT) U/L 125* 407* 126*   ALT (SGPT) U/L 151* 188* 43*     Results from last 7 days   Lab Units 11/19/24 0235 11/18/24 0421 11/17/24  1433   CALCIUM mg/dL 7.9* 7.4* 7.4*   ALBUMIN g/dL 1.9* 1.7* 1.6*   MAGNESIUM mg/dL  --   --  2.2   PHOSPHORUS mg/dL  --   --  2.4*     Results from last 7 days   Lab Units 11/17/24  1433   PROCALCITONIN ng/mL 0.48*     No results found for: \"COVID19\"  Glucose   Date/Time Value Ref Range Status   11/20/2024 1109 79 70 - 130 mg/dL Final   11/20/2024 0608 90 70 - 130 mg/dL Final   11/20/2024 0019 97 70 - 130 mg/dL Final   11/19/2024 1842 92 70 - 130 mg/dL Final         CT Abdomen Pelvis Without Contrast  Narrative: CT OF THE ABDOMEN AND PELVIS WITHOUT CONTRAST 11/17/2024     HISTORY: Air beneath left hemidiaphragm on chest radiograph from today.     Spiral images were obtained from the lung bases to the symphysis pubis.  No intravenous or oral contrast was given.     There is a trace left pleural effusion with some minimal left lower lobe  atelectasis. Stomach is moderately severely distended with fluid and  air, as well as some particulate probable food material. There also is  moderate gaseous distention of the colon measuring up to 6.7 cm in the  transverse colon. Descending colon is not dilated. There is a moderate  amount of stool in the " descending and sigmoid colon, as well as in the  right colon. There is a moderately dilated segment of bowel in the  leftward abdomen, more severely dilated than on the 11/08/2024 study and  this could represent short segment of dilated small bowel.     There is no evidence of pneumoperitoneum.     The liver, gallbladder, spleen and adrenals appear unremarkable, except  for hepatic cysts. Bilateral double-J ureteral stents are seen.  Nonobstructing bilateral renal stones are seen but there is a left renal  cyst.     A left hip prosthesis is seen.     Impression: 1. Moderately severely dilated stomach filled with air, fluid and  particulate debris. There also is a moderate amount of air within the  colon.  2. No evidence of pneumoperitoneum.  3. Short segment of apparently dilated small bowel in the left lower  abdomen, more severely dilated than on the 11/08/2024 study.  4. Minimal left pleural effusion with some minimal left lower lobe  atelectasis.  5. Hepatic cysts.  6. Bilateral double-J ureteral stents with nonobstructing bilateral  renal stones and small left renal cyst.  7. Consider short-term follow-up CT of the abdomen and pelvis to assess  the stomach and dilated bowel.     Radiation dose reduction techniques were utilized, including automated  exposure control and exposure modulation based on body size.        This report was finalized on 11/19/2024 5:20 PM by Dr. Moreno Hood M.D on Workstation: [id number]     XR Abdomen Flat & Upright  Narrative: XR ABDOMEN FLAT AND UPRIGHT-11/19/2024     HISTORY: Follow-up gastric distention.     There is moderately large amount of bowel gas in the left upper quadrant  of the abdomen which may be within the stomach and splenic flexure of  the colon. The stomach and colon are slightly more distended than on a  previous study from yesterday but appear less severely dilated than on  the chest radiograph of 11/17/2024.     There is elevation of the left  hemidiaphragm and NG tube courses into  the stomach. Bilateral double-J ureteral stents are seen. Bilateral  renal stones are seen.     Impression: 1. Moderately large amount of bowel gas in the left upper quadrant and  leftward abdomen probably in the stomach and colon. The amount of gas in  this region appears slightly greater than on yesterday's study but  appears improved from the 11/17/2024 study after placement of the NG  tube.  2. NG tube tip thought to be within the body of the stomach. Additional  follow-up films recommended.        This report was finalized on 11/19/2024 7:34 AM by Dr. Moreno Hood M.D on Workstation: [id number]       Scheduled Medications  bisacodyl, 10 mg, Rectal, Daily  midodrine, 5 mg, Oral, TID AC  pantoprazole, 40 mg, Intravenous, BID AC    Infusions  heparin, 18 Units/kg/hr, Last Rate: 20 Units/kg/hr (11/20/24 0812)  sodium chloride, 75 mL/hr, Last Rate: 75 mL/hr (11/19/24 1743)    Diet  Diet: Liquid; Clear Liquid; Fluid Consistency: Thin (IDDSI 0)       Assessment/Plan     Active Hospital Problems    Diagnosis  POA    **Hypernatremia [E87.0]  Yes    LEVON (acute kidney injury) [N17.9]  Yes    Acute metabolic encephalopathy [G93.41]  Yes    UTI (urinary tract infection) due to urinary indwelling catheter [T83.511A, N39.0]  Yes    Severe sepsis [A41.9, R65.20]  Yes    Elevated troponin [R79.89]  Yes    Anemia [D64.9]  Yes    Thrombocytopenia [D69.6]  Yes    Perforation of rectum [K63.1]  Unknown    Dementia with behavioral disturbance [F03.918]  Yes    Severe protein-calorie malnutrition [E43]  Yes    Chronic kidney disease, 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       84 y.o. male admitted with Hypernatremia.    Mr. Jefferson is a 84 y.o. male with a history of Alzheimer's dementia, CKD, chronic Kim catheter and hypertension that presented to the hospital with altered mental status.  He hadn't eaten  "much prior to admission and was not acting like himself.  He is mostly bedridden at baseline and was found to be altered with low blood pressure on arrival.  He was reported to also be nonverbal with severe dementia.  He was hypernatremic with a sodium of 172 and given IV fluids for hypotension and concerns for sepsis.  He was admitted to the ICU and nephrology was consulted.  He was suspected to have severe sepsis secondary to UTI in the setting of chronic indwelling catheter.  He was placed on empiric antibiotics.  CT A/P was obtained showing a rectal probe going through the anterior wall of the rectum and located over the left presacral space.  General surgery was consulted and the rectal probe was removed.  He was weaned off pressors and his sodium levels improved.  He was cleared to move out of the ICU on 11/10 and A was asked to assume care. Ultimately, it was felt that the patient would require a feeding tube, but the family didn't feel this was consistent with the patient's wishes and so he was transitioned to comfort care. All therapies not for comfort except midodrine have been discontinued and the palliative care order set was initiated on 11/11/24. He hasn't been requiring many prn medications for symptom management and so on 11/17/24, the patient's family decided to reverse his goals of care to DNR/DNI/full treatment.    Right foot ischemia  - asked vascular to see regarding coolness of the R foot/color changes; d/w dtr Janay who reported right foot first became dark when he was in the ICU on pressors  - Dr. Garcia evaluated/I discussed with him- the foot is dead and there is no benefit to revascularization- options would be surgery with AKA or comfort measures; I called to discuss with Janay who had questions about getting her dad \"healthy\" for surgery- given his advanced dementia/overall frailty he is a poor surgical candidate and unlikely to improve; she did ask to speak to Dr. Garcia as well as " her family before making any decisions  - have asked ID to weigh in to see If abx would be of any benefit given his leukocytosis- ID does not recommend abx  - palliative d/w wife who does not want surgery for her   - leukocytosis related to RLE ischemia    Left lower extremity DVT  - cont heparin gtt pending surgical decisions; hgb checks [id number]- stable  - d/w pharmacy pt ok for lovenox (may need attunated dosing pending renal fcn on todays labs)- dtr reports he picks at lines and tubes     Gastric distention/small bowel dilation  - XR 11/17 with possible pneumoperitoneum- CT ordered which did not show pneumoperitoneum but did show severely distended stomach and portion of small bowel more distended than previously. NGT placed to LWS with 950cc immediate output  - General surgery followed, had NGT for decompression- now out and trialing CLD- NPO until SLP can see.     Elevated liver enzymes  - previously normal earlier this admission, may be related to meds, hypotension  - LFTs improving yest; todays labs pending; if worsening would need MRCP    Severe hypernatremia  LEVON on CKD2  -prerenal levon due to dehydration  -LEVON was improving with IVF on last check  -hypernatremia has resolved with D5w  -creatinine is down to 1.61 today from 2.49 on 11/11/24  - given NPO- cont slow IVF     Concern for UTI due to chronic indwelling catheter causing septic shock  -Treated with 3 days of Cefepime.   -Urine culture grew 50,000 Enterococcus faecium, VRE and Steptococcus ovis-> felt contaminant by pulmonology.  -his shock was more likely hypovolemic in nature since no infection was clearly identified   -he has a persistent leukocytosis on labs that is a little worse than it was a week ago. He's unable to tell me any symptoms- blood and urine cx colected 11/17 are pending; CXR negative; procal only slightly above normal in the setting of CKD     Acute metabolic encephalopathy  Dementia with behavioral disturbance  -Mostly  bedridden and nonverbal per admitting notes.  -intermittent restraints  -Poor candidate for PEG with underlying dementia, but now is eating a little bit  -appears to be at baseline, which is quite low     Extraperitoneal perforation of the anterior wall of the rectum  -Iatrogenic in nature. Probe removed by surgery earlier in the admission. Felt unlikely to cause infection, but something to consider with persistent leukocytosis  - No longer seen on repeat CT from 11/17     Anemia  -hgb stable at 8.0    Thrombocytopenia  -resolved on labs today    HTN with hypotension this admission  -On midodrine and previously pressors.    DVT prophylaxis: On heparin gtt for treatment of LLE dvt  Code: DNR/DNI  Discussed with family and nursing staff and palliatice care provider.   Anticipate discharge  SNF vs palliative care unit  timing yet to be determined.    While goals of care are being decided- wife who is decision maker is ok with low doses of pain meds- will trial 0.25 mg iv dilaudid q2h prn.     Lillie Latif MD  [facility]  11/20/24  14:52 EST    Addendum:   BMP returned this afternoon; kidney fcn and acidosis worsening, maybe related to NS IVF, will dc NS and re-consult renal.

## 2024-11-21 LAB
ABO GROUP BLD: NORMAL
ALBUMIN SERPL-MCNC: 1.7 G/DL (ref 3.5–5.2)
ANION GAP SERPL CALCULATED.3IONS-SCNC: 13.9 MMOL/L (ref 5–15)
APTT PPP: 40.6 SECONDS (ref 22.7–35.4)
BASOPHILS # BLD AUTO: 0.03 10*3/MM3 (ref 0–0.2)
BASOPHILS NFR BLD AUTO: 0.1 % (ref 0–1.5)
BLD GP AB SCN SERPL QL: NEGATIVE
BUN SERPL-MCNC: 32 MG/DL (ref 8–23)
BUN/CREAT SERPL: 20.3 (ref 7–25)
CALCIUM SPEC-SCNC: 7.6 MG/DL (ref 8.6–10.5)
CHLORIDE SERPL-SCNC: 115 MMOL/L (ref 98–107)
CO2 SERPL-SCNC: 15.1 MMOL/L (ref 22–29)
CREAT SERPL-MCNC: 1.58 MG/DL (ref 0.76–1.27)
DEPRECATED RDW RBC AUTO: 49 FL (ref 37–54)
EGFRCR SERPLBLD CKD-EPI 2021: 43.4 ML/MIN/1.73
EOSINOPHIL # BLD AUTO: 0.02 10*3/MM3 (ref 0–0.4)
EOSINOPHIL NFR BLD AUTO: 0.1 % (ref 0.3–6.2)
ERYTHROCYTE [DISTWIDTH] IN BLOOD BY AUTOMATED COUNT: 14.4 % (ref 12.3–15.4)
GLUCOSE BLDC GLUCOMTR-MCNC: 89 MG/DL (ref 70–130)
GLUCOSE SERPL-MCNC: 95 MG/DL (ref 65–99)
HCT VFR BLD AUTO: 20.3 % (ref 37.5–51)
HCT VFR BLD AUTO: 22 % (ref 37.5–51)
HGB BLD-MCNC: 6.8 G/DL (ref 13–17.7)
HGB BLD-MCNC: 7.1 G/DL (ref 13–17.7)
IMM GRANULOCYTES # BLD AUTO: 0.24 10*3/MM3 (ref 0–0.05)
IMM GRANULOCYTES NFR BLD AUTO: 1.2 % (ref 0–0.5)
LYMPHOCYTES # BLD AUTO: 1.2 10*3/MM3 (ref 0.7–3.1)
LYMPHOCYTES NFR BLD AUTO: 5.8 % (ref 19.6–45.3)
MAGNESIUM SERPL-MCNC: 2.1 MG/DL (ref 1.6–2.4)
MCH RBC QN AUTO: 31.8 PG (ref 26.6–33)
MCHC RBC AUTO-ENTMCNC: 33.5 G/DL (ref 31.5–35.7)
MCV RBC AUTO: 94.9 FL (ref 79–97)
MONOCYTES # BLD AUTO: 0.72 10*3/MM3 (ref 0.1–0.9)
MONOCYTES NFR BLD AUTO: 3.5 % (ref 5–12)
NEUTROPHILS NFR BLD AUTO: 18.48 10*3/MM3 (ref 1.7–7)
NEUTROPHILS NFR BLD AUTO: 89.3 % (ref 42.7–76)
NRBC BLD AUTO-RTO: 0 /100 WBC (ref 0–0.2)
PHOSPHATE SERPL-MCNC: 4.4 MG/DL (ref 2.5–4.5)
PLATELET # BLD AUTO: 320 10*3/MM3 (ref 140–450)
PMV BLD AUTO: 9.5 FL (ref 6–12)
POTASSIUM SERPL-SCNC: 3.6 MMOL/L (ref 3.5–5.2)
RBC # BLD AUTO: 2.14 10*6/MM3 (ref 4.14–5.8)
RH BLD: POSITIVE
SODIUM SERPL-SCNC: 144 MMOL/L (ref 136–145)
T&S EXPIRATION DATE: NORMAL
WBC NRBC COR # BLD AUTO: 20.69 10*3/MM3 (ref 3.4–10.8)

## 2024-11-21 PROCEDURE — 25010000002 MORPHINE PER 10 MG: Performed by: STUDENT IN AN ORGANIZED HEALTH CARE EDUCATION/TRAINING PROGRAM

## 2024-11-21 PROCEDURE — 86901 BLOOD TYPING SEROLOGIC RH(D): CPT | Performed by: STUDENT IN AN ORGANIZED HEALTH CARE EDUCATION/TRAINING PROGRAM

## 2024-11-21 PROCEDURE — 25010000002 LORAZEPAM PER 2 MG: Performed by: STUDENT IN AN ORGANIZED HEALTH CARE EDUCATION/TRAINING PROGRAM

## 2024-11-21 PROCEDURE — 85018 HEMOGLOBIN: CPT | Performed by: STUDENT IN AN ORGANIZED HEALTH CARE EDUCATION/TRAINING PROGRAM

## 2024-11-21 PROCEDURE — 85014 HEMATOCRIT: CPT | Performed by: STUDENT IN AN ORGANIZED HEALTH CARE EDUCATION/TRAINING PROGRAM

## 2024-11-21 PROCEDURE — 85025 COMPLETE CBC W/AUTO DIFF WBC: CPT | Performed by: STUDENT IN AN ORGANIZED HEALTH CARE EDUCATION/TRAINING PROGRAM

## 2024-11-21 PROCEDURE — 25010000002 HEPARIN (PORCINE) PER 1000 UNITS: Performed by: STUDENT IN AN ORGANIZED HEALTH CARE EDUCATION/TRAINING PROGRAM

## 2024-11-21 PROCEDURE — 86850 RBC ANTIBODY SCREEN: CPT | Performed by: STUDENT IN AN ORGANIZED HEALTH CARE EDUCATION/TRAINING PROGRAM

## 2024-11-21 PROCEDURE — 83735 ASSAY OF MAGNESIUM: CPT | Performed by: INTERNAL MEDICINE

## 2024-11-21 PROCEDURE — 99232 SBSQ HOSP IP/OBS MODERATE 35: CPT | Performed by: INTERNAL MEDICINE

## 2024-11-21 PROCEDURE — 86900 BLOOD TYPING SEROLOGIC ABO: CPT | Performed by: STUDENT IN AN ORGANIZED HEALTH CARE EDUCATION/TRAINING PROGRAM

## 2024-11-21 PROCEDURE — 99232 SBSQ HOSP IP/OBS MODERATE 35: CPT | Performed by: SURGERY

## 2024-11-21 PROCEDURE — 80069 RENAL FUNCTION PANEL: CPT | Performed by: INTERNAL MEDICINE

## 2024-11-21 PROCEDURE — 86923 COMPATIBILITY TEST ELECTRIC: CPT

## 2024-11-21 PROCEDURE — 85730 THROMBOPLASTIN TIME PARTIAL: CPT | Performed by: STUDENT IN AN ORGANIZED HEALTH CARE EDUCATION/TRAINING PROGRAM

## 2024-11-21 PROCEDURE — 82948 REAGENT STRIP/BLOOD GLUCOSE: CPT

## 2024-11-21 RX ORDER — ACETAMINOPHEN 160 MG/5ML
650 SOLUTION ORAL EVERY 4 HOURS PRN
Status: DISCONTINUED | OUTPATIENT
Start: 2024-11-21 | End: 2024-11-22 | Stop reason: HOSPADM

## 2024-11-21 RX ORDER — MORPHINE SULFATE 2 MG/ML
2 INJECTION, SOLUTION INTRAMUSCULAR; INTRAVENOUS
Status: DISCONTINUED | OUTPATIENT
Start: 2024-11-21 | End: 2024-11-22 | Stop reason: HOSPADM

## 2024-11-21 RX ORDER — LORAZEPAM 2 MG/ML
2 CONCENTRATE ORAL
Status: DISCONTINUED | OUTPATIENT
Start: 2024-11-21 | End: 2024-11-22 | Stop reason: HOSPADM

## 2024-11-21 RX ORDER — LORAZEPAM 2 MG/ML
1 CONCENTRATE ORAL
Status: DISCONTINUED | OUTPATIENT
Start: 2024-11-21 | End: 2024-11-22 | Stop reason: HOSPADM

## 2024-11-21 RX ORDER — MORPHINE SULFATE 10 MG/ML
6 INJECTION INTRAMUSCULAR; INTRAVENOUS; SUBCUTANEOUS
Status: DISCONTINUED | OUTPATIENT
Start: 2024-11-21 | End: 2024-11-22 | Stop reason: HOSPADM

## 2024-11-21 RX ORDER — HYDROMORPHONE HYDROCHLORIDE 1 MG/ML
0.5 INJECTION, SOLUTION INTRAMUSCULAR; INTRAVENOUS; SUBCUTANEOUS
Status: DISCONTINUED | OUTPATIENT
Start: 2024-11-21 | End: 2024-11-22 | Stop reason: HOSPADM

## 2024-11-21 RX ORDER — LORAZEPAM 2 MG/ML
0.5 INJECTION INTRAMUSCULAR
Status: DISCONTINUED | OUTPATIENT
Start: 2024-11-21 | End: 2024-11-22 | Stop reason: HOSPADM

## 2024-11-21 RX ORDER — ACETAMINOPHEN 325 MG/1
650 TABLET ORAL EVERY 4 HOURS PRN
Status: DISCONTINUED | OUTPATIENT
Start: 2024-11-21 | End: 2024-11-22 | Stop reason: HOSPADM

## 2024-11-21 RX ORDER — MORPHINE SULFATE 20 MG/ML
10 SOLUTION ORAL
Status: DISCONTINUED | OUTPATIENT
Start: 2024-11-21 | End: 2024-11-22 | Stop reason: HOSPADM

## 2024-11-21 RX ORDER — LORAZEPAM 2 MG/ML
2 INJECTION INTRAMUSCULAR
Status: DISCONTINUED | OUTPATIENT
Start: 2024-11-21 | End: 2024-11-22 | Stop reason: HOSPADM

## 2024-11-21 RX ORDER — LORAZEPAM 2 MG/ML
0.5 CONCENTRATE ORAL
Status: DISCONTINUED | OUTPATIENT
Start: 2024-11-21 | End: 2024-11-22 | Stop reason: HOSPADM

## 2024-11-21 RX ORDER — ACETAMINOPHEN 650 MG/1
650 SUPPOSITORY RECTAL EVERY 4 HOURS PRN
Status: DISCONTINUED | OUTPATIENT
Start: 2024-11-21 | End: 2024-11-22 | Stop reason: HOSPADM

## 2024-11-21 RX ORDER — DIPHENOXYLATE HYDROCHLORIDE AND ATROPINE SULFATE 2.5; .025 MG/1; MG/1
1 TABLET ORAL
Status: DISCONTINUED | OUTPATIENT
Start: 2024-11-21 | End: 2024-11-22 | Stop reason: HOSPADM

## 2024-11-21 RX ORDER — MORPHINE SULFATE 2 MG/ML
4 INJECTION, SOLUTION INTRAMUSCULAR; INTRAVENOUS
Status: DISCONTINUED | OUTPATIENT
Start: 2024-11-21 | End: 2024-11-22 | Stop reason: HOSPADM

## 2024-11-21 RX ORDER — LORAZEPAM 2 MG/ML
1 INJECTION INTRAMUSCULAR
Status: DISCONTINUED | OUTPATIENT
Start: 2024-11-21 | End: 2024-11-22 | Stop reason: HOSPADM

## 2024-11-21 RX ORDER — MORPHINE SULFATE 20 MG/ML
5 SOLUTION ORAL
Status: DISCONTINUED | OUTPATIENT
Start: 2024-11-21 | End: 2024-11-22 | Stop reason: HOSPADM

## 2024-11-21 RX ORDER — MORPHINE SULFATE 20 MG/ML
20 SOLUTION ORAL
Status: DISCONTINUED | OUTPATIENT
Start: 2024-11-21 | End: 2024-11-22 | Stop reason: HOSPADM

## 2024-11-21 RX ADMIN — MORPHINE SULFATE 4 MG: 2 INJECTION, SOLUTION INTRAMUSCULAR; INTRAVENOUS at 20:37

## 2024-11-21 RX ADMIN — ACETAMINOPHEN 650 MG: 650 SUPPOSITORY RECTAL at 06:02

## 2024-11-21 RX ADMIN — MORPHINE SULFATE 2 MG: 2 INJECTION, SOLUTION INTRAMUSCULAR; INTRAVENOUS at 15:52

## 2024-11-21 RX ADMIN — HEPARIN SODIUM 5400 UNITS: 5000 INJECTION INTRAVENOUS; SUBCUTANEOUS at 08:10

## 2024-11-21 RX ADMIN — LORAZEPAM 1 MG: 2 INJECTION INTRAMUSCULAR; INTRAVENOUS at 14:07

## 2024-11-21 RX ADMIN — MIDODRINE HYDROCHLORIDE 5 MG: 5 TABLET ORAL at 11:26

## 2024-11-21 RX ADMIN — BISACODYL 10 MG: 10 SUPPOSITORY RECTAL at 11:25

## 2024-11-21 NOTE — PROGRESS NOTES
ID note for leukcoytosis  S: pt awake, denies any complaints. AF.    Physical Exam:   Vital Signs   Temp:  [97.5 °F (36.4 °C)-99.3 °F (37.4 °C)] 98 °F (36.7 °C)  Heart Rate:  [106-119] 108  Resp:  [16-18] 16  BP: (105-125)/(59-73) 105/72    GENERAL: Awake and alert, in no acute distress.   HEENT: Oropharynx is clear. Hearing is grossly normal.   EYES: . No conjunctival injection. No lid lag.   LUNGS:normal respiratory effort.   SKIN:ischemic r foot without drainage    Results Review:  White count 20.69, hemoglobin 6.8, platelet 320  Creatinine 1.58     11/7 blood cultures negative  11/7 urine culture 50,000 Enterococcus VRE and Streptococcus bovis  11/17 urine culture greater than 100,000 VRE  11/17 blood cultures no growth to date     A/p  1.  Severe right foot ischemia/dry gangrene  2.  Acute kidney injury on chronic kidney disease stage II  3.  Perforation of the rectum  4.  Severe hypernatremia on admission, resolved  5.  Dementia with behavioral disturbance  6.  Acute left lower extremity DVT in the common femoral down to the popliteal  7.  Ileus  8.  Chronic Kim catheter with bacteriuria and chronic colonization  9. Leukocytosis secondary to above    Patient with leukocytosis, likely multifactorial from multiple issues including ileus, DVT, right foot ischemia.  UCx results noted and likely colonizer.  WBC stable and AF with bcx ngtd. I currently do not see a role for antibiotics.  Prognosis seems poor and will physicians recommending palliative measures.  Family deciding best course at this time.        ADDENDUM: pt now transitioned to comfort measures. We are available if needed

## 2024-11-21 NOTE — NURSING NOTE
CWCN: We see patient for follow-up of existing injuries, but the floor nurse informs us that he is on palliative care and comfort measures.  Please re-consult for any additional needs.

## 2024-11-21 NOTE — CASE MANAGEMENT/SOCIAL WORK
Continued Stay Note  [facility]     Patient Name: Sabas Jefferson  MRN: [id number]  Today's Date: 11/21/2024    Admit Date: 11/7/2024    Plan: Tx to 4P   Discharge Plan       Row Name 11/21/24 1256       Plan    Plan Tx to 4P    Patient/Family in Agreement with Plan yes    Plan Comments Tx to 4P for Comfort/Palliative Care.                   Discharge Codes    No documentation.                 Expected Discharge Date and Time       Expected Discharge Date Expected Discharge Time    Nov 25, 2024               Lynette Feliciano RN

## 2024-11-21 NOTE — TREATMENT PLAN
"I spoke with patient's spouse Alka this morning regarding goals of care. Apparently she had consented to patient receiving blood this morning but when I spoke to her she said she did not want patient to receive blood products and only wanted to focus on comfort. I clarified that this would mean no further escalation of care, discontinuation of heparin drip and blood draws as well as restraints, and calling in Hospice and giving patient pain medication so he will remain calm and comfortable in his final days. She agreed to all of this, saying \"this is his final chapter, and I want him to be comfortable\". She agreed that me calling her children to update them would be fine.     I spoke with Janay, after we spoke she seemed to have good understanding of situation. Although recently she said she had been wanting to pursue all measures to get patient well again, she does understand now that if patient is not going to have amputation he will not recover, and in that case agrees with comfort measures. However she stated she wanted me to talk with her brother Jus first before making any final decisions. She stated her mother Alka can be forgetful at times. I agree with this assessment having spoken with her myself several times. She does have good understanding of big picture and ultimately should have the final say for any decisions regarding end of life care for her , however because of her memory issues I believe we do need to have her children involved in this conversation, contrary to documentation from yesterday from Bailee Monteiro which states Alka did not want her children involved. Today on the phone Alka gave me permission to involve them and had no hesitation in doing so.     At this time I am waiting on return call from son Jus. I left a voicemail with him at 9:09 this morning. I have communicated with Dr. Latif and DANIELA Jain. At this time I recommend holding blood transfusion while I " "wait for clarification from son Jus that all family is in agreement with transition to comfort measures only.     ADDENDUM: Janay called back at 11:20, letting me know her brother Jus works night shift and will probably be sleeping until about 3pm today, so we should wait until then to make sure he is on board with plan of care. She asked why patient was not receiving blood products, and stated if we would just feed patient he would get better and get stronger. We again talked through goals of care and treatment options, explained NPO order for aspiration concern, and that in order to receive adequate nutrition patient would need feeding tube, which as previously discussed with patient's wife and son on 11/11 were not in alignment with goals of care. We discussed dysphagia in light of dementia disease progression as well as other chronic and acute illnesses. Also discussed the fact that blood transfusion won't fix problems with right limb ischemia or dysphagia - two issues that on their own are life threatening. At the end of conversation Janay expressed understanding of situation, just reiterated that I needed to explain this all to patient's son Jus to make sure he is in agreement.     ADDENDUM 11:57 Spoke with patient's son Jus, discussed goals of care and treatment options, he is in agreement with transition back to palliative care and comfort measures as well as hospice consult. Understands all treatment options will be geared towards comfort. Also spoke with \"Torrie\" by phone. Updated her on care plan and she understands plan to transition back to palliative care for comfort measures. Answered all questions and provided support. Updated Dr. Latif and DANIELA Jain.   "

## 2024-11-21 NOTE — PROGRESS NOTES
Name: Sabas Jefferson ADMIT: 2024   : 1940  PCP: Koby Holbrook MD    MRN: [id number] LOS: 14 days   AGE/SEX: 84 y.o. male  ROOM: [facility]/1     Subjective   Subjective   Patient resting in bed, patient lost IV access this morning and it has been replaced.  His hemoglobin was found to be 6.8 and blood was ordered however transfusion is pending final palliative discussion between family and palliative care.       Objective   Objective   Vital Signs  Temp:  [97.5 °F (36.4 °C)-99.3 °F (37.4 °C)] 98 °F (36.7 °C)  Heart Rate:  [104-119] 104  Resp:  [16-18] 16  BP: (105-125)/(59-73) 105/72  SpO2:  [91 %-98 %] 97 %  on   ;   Device (Oxygen Therapy): room air  Body mass index is 19.07 kg/m².  Physical Exam  Constitutional:       General: He is not in acute distress.     Appearance: He is ill-appearing. He is not toxic-appearing.   Cardiovascular:      Rate and Rhythm: Regular rhythm. Tachycardia present.      Heart sounds: Normal heart sounds.   Pulmonary:      Effort: Pulmonary effort is normal.      Breath sounds: Normal breath sounds.   Abdominal:      General: Bowel sounds are normal.      Palpations: Abdomen is soft.      Comments: Abdomen soft, non-tender   Musculoskeletal:         General: No tenderness.      Right lower leg: Edema present.      Left lower leg: Edema present.   Skin:     Comments: Right foot with large blister on dorsum; coolness to the touch of toes, foot/up to ankle   Neurological:      General: No focal deficit present.      Mental Status: He is alert. Mental status is at baseline. He is disoriented.         Results Review     I reviewed the patient's new clinical results.  Results from last 7 days   Lab Units 24  0545 24  0008 24  1606 24  0500 24  0946 24  0235 24  0038 24  0421   WBC 10*3/mm3 20.69*  --   --  20.68*  --  17.51*  --  15.10*   HEMOGLOBIN g/dL 6.8* 7.1* 7.6* 7.9*   < > 8.4*   < > 8.0*   PLATELETS 10*3/mm3 320  --    "--  334  --  343  --  268    < > = values in this interval not displayed.     Results from last 7 days   Lab Units 11/21/24  0545 11/20/24  1606 11/19/24 0235 11/18/24 0421   SODIUM mmol/L 144 141 137 133*   POTASSIUM mmol/L 3.6 4.1 4.0 4.2   CHLORIDE mmol/L 115* 115* 108* 106   CO2 mmol/L 15.1* 11.5* 15.0* 16.8*   BUN mg/dL 32* 37* 34* 35*   CREATININE mg/dL 1.58* 1.80* 1.58* 1.59*   GLUCOSE mg/dL 95 81 91 97   Estimated Creatinine Clearance: 30.7 mL/min (A) (by C-G formula based on SCr of 1.58 mg/dL (H)).  Results from last 7 days   Lab Units 11/21/24  0545 11/20/24  1606 11/19/24  0235 11/18/24 0421 11/17/24  1433   ALBUMIN g/dL 1.7* 1.5* 1.9* 1.7* 1.6*   BILIRUBIN mg/dL  --  0.4 0.5 0.7 0.8   ALK PHOS U/L  --  250* 386* 388* 173*   AST (SGOT) U/L  --  23 125* 407* 126*   ALT (SGPT) U/L  --  70* 151* 188* 43*     Results from last 7 days   Lab Units 11/21/24  0545 11/20/24  1606 11/19/24 0235 11/18/24 0421 11/17/24  1433   CALCIUM mg/dL 7.6* 8.0* 7.9* 7.4* 7.4*   ALBUMIN g/dL 1.7* 1.5* 1.9* 1.7* 1.6*   MAGNESIUM mg/dL 2.1  --   --   --  2.2   PHOSPHORUS mg/dL 4.4  --   --   --  2.4*     Results from last 7 days   Lab Units 11/20/24  1804 11/17/24  1433   PROCALCITONIN ng/mL  --  0.48*   LACTATE mmol/L 1.1  --      No results found for: \"COVID19\"  Glucose   Date/Time Value Ref Range Status   11/21/2024 0552 89 70 - 130 mg/dL Final   11/20/2024 2305 106 70 - 130 mg/dL Final   11/20/2024 1615 81 70 - 130 mg/dL Final   11/20/2024 1109 79 70 - 130 mg/dL Final   11/20/2024 0608 90 70 - 130 mg/dL Final   11/20/2024 0019 97 70 - 130 mg/dL Final   11/19/2024 1842 92 70 - 130 mg/dL Final         US Renal Bilateral  Narrative: RENAL ULTRASOUND     HISTORY: Acute kidney injury     COMPARISON: CT of the abdomen pelvis 11/17/2024     TECHNIQUE: Gray scale and color doppler ultrasound of the kidneys and  bladder was performed.        FINDINGS:  The right kidney measures 7.5 cm in length, and the left kidney measures  8.8 " cm in length. Multiple cysts in the right kidney, largest located in  the upper pole measuring about 3.3 x 3.7 x 4.4 cm. No appreciable  hydronephrosis on the right. There is a 1.4 cm shadowing calculus noted  in the left kidney. There is some left-sided hydronephrosis present.  Kim catheter in the bladder     Impression: 1. No hydronephrosis is appreciated on the right. There is  hydronephrosis present on the left  2. There is a 1.4 cm shadowing calculus in the left kidney  3. Multiple right kidney cysts     This report was finalized on 11/20/2024 8:54 PM by Dr. King Peterson M.D on Workstation: [id number]       Scheduled Medications     Infusions     Diet  NPO Diet NPO Type: Strict NPO       Assessment/Plan     Active Hospital Problems    Diagnosis  POA    **Hypernatremia [E87.0]  Yes    LEVON (acute kidney injury) [N17.9]  Yes    Acute metabolic encephalopathy [G93.41]  Yes    UTI (urinary tract infection) due to urinary indwelling catheter [T83.511A, N39.0]  Yes    Severe sepsis [A41.9, R65.20]  Yes    Elevated troponin [R79.89]  Yes    Anemia [D64.9]  Yes    Thrombocytopenia [D69.6]  Yes    Perforation of rectum [K63.1]  Unknown    Dementia with behavioral disturbance [F03.918]  Yes    Severe protein-calorie malnutrition [E43]  Yes    Chronic kidney disease, 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       84 y.o. male admitted with Hypernatremia.    Mr. Jefferson is a 84 y.o. male with a history of Alzheimer's dementia, CKD, chronic Kim catheter and hypertension that presented to the hospital with altered mental status.  He hadn't eaten much prior to admission and was not acting like himself.  He is mostly bedridden at baseline and was found to be altered with low blood pressure on arrival.  He was reported to also be nonverbal with severe dementia.  He was hypernatremic with a sodium of 172 and given IV fluids for hypotension  and concerns for sepsis.  He was admitted to the ICU and nephrology was consulted.  He was suspected to have severe sepsis secondary to UTI in the setting of chronic indwelling catheter.  He was placed on empiric antibiotics.  CT A/P was obtained showing a rectal probe going through the anterior wall of the rectum and located over the left presacral space.  General surgery was consulted and the rectal probe was removed.  He was weaned off pressors and his sodium levels improved.  He was cleared to move out of the ICU on 11/10 and A was asked to assume care. Ultimately, it was felt that the patient would require a feeding tube, but the family didn't feel this was consistent with the patient's wishes and so he was transitioned to comfort care. All therapies not for comfort except midodrine have been discontinued and the palliative care order set was initiated on 11/11/24. He hasn't been requiring many prn medications for symptom management and so on 11/17/24, the patient's family decided to reverse his goals of care to DNR/DNI/full treatment.       While DNR/DNI/full treatment the patient was found to have gastric distention and small bowel dilation for which an NG tube was placed for bowel decompression, surgery followed along/helped to manage.  Dopplers of his left lower extremity revealed a DVT, for which she was initiated on a heparin drip.  He was also noted to have a cool foot which was initially noted when the patient was on pressors in the ICU, a vascular surgery consult was requested and the foot was found to be dead, per vascular surgery, revascularization is not an option/would be futile/the only option would be for amputation or pursue comfort measures.  Multiple conversations were had with the wife and family and with palliative care, the wife with the support of her children has made the decision to transition the patient back to full comfort measures with as needed pain and anxiety medications and to  consult with hospice.  Comfort care order set placed, transfer order placed.    Hospital issues no longer being actively managed  Right foot ischemia  Dysphagia  Left lower extremity DVT  Gastric distention/small bowel dilation  Elevated liver enzymes-improving  Severe hypernatremia  LEVON on CKD2  Concern for UTI due to chronic indwelling catheter causing septic shock  Acute metabolic encephalopathy  Dementia with behavioral disturbance  Extraperitoneal perforation of the anterior wall of the rectum  Anemia  Thrombocytopenia  HTN with hypotension this admission    Anticipate patient will remain at [facility] for end-of-life care.  Hospice consult is pending    Lillie Latif MD  [facility]  11/21/24  12:16 EST

## 2024-11-21 NOTE — PROGRESS NOTES
Name: Sabas Jefferson ADMIT: 2024   : 1940  PCP: Koby Holbrook MD    MRN: [id number] LOS: 14 days   AGE/SEX: 84 y.o. male  ROOM:  [place] [address]     Vascular Surgery Progress Note    Received message from Bailee with Palliative Care forwarded from Dr. Garcia this morning stating patient's wife, who is POA, has decided against surgical intervention and requests we not confer with patients children regarding this decision.   In light of this, we will follow peripherally but be available to answer any questions for family and see patient if any needs arise that we can help with.   In the meantime, I recommend xeroform gauze and dry gauze to the ischemic right foot.               Talon Horner II, MD  24  09:07 EST  Office Number [phone]    [facility] Vascular Surgery

## 2024-11-21 NOTE — PLAN OF CARE
Goal Outcome Evaluation:  Plan of Care Reviewed With: patient        Progress: no change  Outcome Evaluation: vss. remains on room air. sinus tachy. gonsalez care completed. q2 turns. wound care completed. heparin gtt. ivf. restraints reapplied d/t pt pulling at lines; notified wife, Alka.

## 2024-11-21 NOTE — PROGRESS NOTES
[facility] Progress Note      Patient Name: Sabas Jefferson  : 1940  MRN: [id number]  Primary Care Physician:  Koby Holbrook MD  Date of admission: 2024    Subjective     Interval History:   Follow up LEVON and acidosis.  ABG with  metabolic acidosis last night.   Received ivf for 12 hours, but lost IV access this am.  In restraints.  UOP not recorded.   Review of Systems:   Unable to obtain.    Objective     Vitals:   Temp:  [97.5 °F (36.4 °C)-99.3 °F (37.4 °C)] 98.1 °F (36.7 °C)  Heart Rate:  [106-119] 111  Resp:  [16-18] 16  BP: (109-125)/(59-73) 115/59    Intake/Output Summary (Last 24 hours) at 2024 0735  Last data filed at 2024 0635  Gross per 24 hour   Intake 1328.49 ml   Output 250 ml   Net 1078.49 ml       Physical Exam:    General Appearance: Awake. Cachectic.  Not oriented, not verbal.    Does shake his head yes to some.  questions. Gaunt.   Skin: warm and dry  HEENT: oral mucosa dry. nonicteric sclera  Neck: supple, no JVD  Lungs: Upper airway rhonchi.  No wheezing.  Heart: RRR, tachycardic.  Normal S1 and S2  Abdomen: soft, nontender, slightly distended.  +bowel sounds.  : Kim catheter  Extremities: Right lower extremity ischemia. Right  Foot cold.  Dressing in place.  Left lower extremity without edema. LUE edema.   Neuro: Nonverbal.  Nods his head yes to some questions.  Are you cold?, Do you want blanket pulled up?    Scheduled Meds:     bisacodyl, 10 mg, Rectal, Daily  midodrine, 5 mg, Oral, TID AC  pantoprazole, 40 mg, Intravenous, BID AC      IV Meds:   heparin, 18 Units/kg/hr, Last Rate: Stopped (24 0648)  sodium chloride, 50 mL/hr, Last Rate: 50 mL/hr (24 0634)        Results Reviewed:   I have personally reviewed the results from the time of this admission to 2024 07:35 EST     Results from last 7 days   Lab Units 24  0545 24  1606 24  0235 24  0421   SODIUM mmol/L 144 141 137 133*    POTASSIUM mmol/L 3.6 4.1 4.0 4.2   CHLORIDE mmol/L 115* 115* 108* 106   CO2 mmol/L 15.1* 11.5* 15.0* 16.8*   BUN mg/dL 32* 37* 34* 35*   CREATININE mg/dL 1.58* 1.80* 1.58* 1.59*   CALCIUM mg/dL 7.6* 8.0* 7.9* 7.4*   BILIRUBIN mg/dL  --  0.4 0.5 0.7   ALK PHOS U/L  --  250* 386* 388*   ALT (SGPT) U/L  --  70* 151* 188*   AST (SGOT) U/L  --  23 125* 407*   GLUCOSE mg/dL 95 81 91 97       Estimated Creatinine Clearance: 30.7 mL/min (A) (by C-G formula based on SCr of 1.58 mg/dL (H)).    Results from last 7 days   Lab Units 11/21/24  0545 11/17/24  1433   MAGNESIUM mg/dL 2.1 2.2   PHOSPHORUS mg/dL 4.4 2.4*             Results from last 7 days   Lab Units 11/21/24  0545 11/21/24  0008 11/20/24  1606 11/20/24  0500 11/20/24  0007 11/19/24  0946 11/19/24  0235 11/19/24  0038 11/18/24  0421 11/17/24  1433   WBC 10*3/mm3 20.69*  --   --  20.68*  --   --  17.51*  --  15.10* 15.35*   HEMOGLOBIN g/dL 6.8* 7.1* 7.6* 7.9* 7.8*   < > 8.4*   < > 8.0* 8.0*   PLATELETS 10*3/mm3 320  --   --  334  --   --  343  --  268 244    < > = values in this interval not displayed.       Results from last 7 days   Lab Units 11/18/24  1801   INR  1.12*       Assessment / Plan     ASSESSMENT:  Acute kidney injury.  Peak creatinine was 4.3 on admission.  Darvin here  1.5 on 11/18/2024.  Today improved to 1.58 after IVF.  Nonoliguric, but not all urine recorded.  Does have known bilateral double-J ureteral stents in place.   Left hydronephrosis by US .  May not be clinically significant since creatinine improved with IVF.  Low serum bicarbonate with normal anion gap.  Patient ABG 11/10/24 with respiratory alkalosis, however, last night had metabolic acidemia.  Serum bicarb improved to 15 today with AG 14.   Alzheimer's dementia  Sepsis due to rectal probe in rectum with extraperitoneal perforation anterior rectal wall.  Removed by Dr. Fierro. Persistent leukocytosis . VRE Enterococcus UTI 11/17/24. Dr. Obrien evaluating. Not currently on antibiotic  .  5. Ischemic right foot. Only surgical option is AKA per vascular surgery .  6. Hypotension, BP stable on midodrine.  7. Anemia Hemoglobin 6.8 this am.    Not drawn above IV per RN.  Recommend 2 units PRBC this am .  8. Severe protein calorie malnutrition. Albumin 1.7. NPO because I thought he was aspirating yesterday evening. Speech evaluation ordered for today .  9. Extensive LLE DVT.  On heparin drip.  PLAN:  2 units PRBC today if Dr. Latif agrees.  Hold IVF while PRBC infusing.    Palliative care discussions ongoing .  Thank you for involving us in the care of Sabas Jefferson.  Please feel free to call with any questions.    Carmelita Navarrete MD  11/21/24  07:35 [facility]    [facility]  [phone]    Please note that portions of this note were completed with a voice recognition program.

## 2024-11-21 NOTE — PROGRESS NOTES
"Progress Note    Chief Complaint   Patient presents with    Altered Mental Status       S: No events overnight.  Having bowel function.  No nausea or vomiting    O:/72 (BP Location: Right arm, Patient Position: Lying)   Pulse 104   Temp 98 °F (36.7 °C) (Oral)   Resp 16   Ht 177.8 cm (70\")   Wt 60.3 kg (132 lb 15 oz)   SpO2 97%   BMI 19.07 kg/m²   Body mass index is 19.07 kg/m².    I/O last 3 completed shifts:  In: 1328.5 [I.V.:1328.5]  Out: 535 [Urine:525; Emesis/NG output:10]  No intake/output data recorded.      GENERAL: Alert, no acute distress  HEENT: normochephalic, atraumatic  CHEST: Breathing comfortable  CARDIAC: Tachycardic, regular rhythm  ABDOMEN: soft, nontender, nondistended, no masses or organomegaly, well-healed midline incision    Results from last 7 days   Lab Units 11/21/24  0545   WBC 10*3/mm3 20.69*   HEMOGLOBIN g/dL 6.8*   HEMATOCRIT % 20.3*   PLATELETS 10*3/mm3 320     Results from last 7 days   Lab Units 11/21/24  0545 11/20/24  1606 11/19/24  0235 11/18/24  0421   SODIUM mmol/L 144 141 137 133*   POTASSIUM mmol/L 3.6 4.1 4.0 4.2   CHLORIDE mmol/L 115* 115* 108* 106   CO2 mmol/L 15.1* 11.5* 15.0* 16.8*   BUN mg/dL 32* 37* 34* 35*   CREATININE mg/dL 1.58* 1.80* 1.58* 1.59*   CALCIUM mg/dL 7.6* 8.0* 7.9* 7.4*   BILIRUBIN mg/dL  --  0.4 0.5 0.7   ALK PHOS U/L  --  250* 386* 388*   ALT (SGPT) U/L  --  70* 151* 188*   AST (SGOT) U/L  --  23 125* 407*   GLUCOSE mg/dL 95 81 91 97       ROS:   Unable to obtain    DIET: N.p.o.    A/P: 84 y.o. male with ileus versus small bowel obstruction.  He has been having bowel function.  No more nausea or vomiting.  Patient continued to be extremely sick due to advanced dementia, ischemic leg.  Family has decided patient to be moved back to palliative care and I agree.  Diet as tolerated  I will sign off, call with questions.         Ron Fierro MD  General, Minimally Invasive and Endoscopic Surgery  [facility]    [address], [address]  [place], [place], [address]  P: [phone]  F: [phone]

## 2024-11-22 VITALS
HEIGHT: 70 IN | HEART RATE: 82 BPM | TEMPERATURE: 98.9 F | OXYGEN SATURATION: 93 % | WEIGHT: 132.94 LBS | BODY MASS INDEX: 19.03 KG/M2 | SYSTOLIC BLOOD PRESSURE: 83 MMHG | DIASTOLIC BLOOD PRESSURE: 54 MMHG | RESPIRATION RATE: 12 BRPM

## 2024-11-22 PROBLEM — G31.1 SENILE DEGENERATION OF BRAIN: Status: ACTIVE | Noted: 2024-11-22

## 2024-11-22 LAB
BACTERIA SPEC AEROBE CULT: NORMAL
BACTERIA SPEC AEROBE CULT: NORMAL

## 2024-11-22 PROCEDURE — 25010000002 LORAZEPAM PER 2 MG: Performed by: STUDENT IN AN ORGANIZED HEALTH CARE EDUCATION/TRAINING PROGRAM

## 2024-11-22 PROCEDURE — 25010000002 MORPHINE PER 10 MG: Performed by: STUDENT IN AN ORGANIZED HEALTH CARE EDUCATION/TRAINING PROGRAM

## 2024-11-22 RX ORDER — LORAZEPAM 2 MG/ML
1 CONCENTRATE ORAL
Status: CANCELLED | OUTPATIENT
Start: 2024-11-22 | End: 2024-11-26

## 2024-11-22 RX ORDER — ACETAMINOPHEN 325 MG/1
650 TABLET ORAL EVERY 4 HOURS PRN
Status: CANCELLED | OUTPATIENT
Start: 2024-11-22

## 2024-11-22 RX ORDER — ONDANSETRON 4 MG/1
4 TABLET, ORALLY DISINTEGRATING ORAL EVERY 6 HOURS PRN
Status: CANCELLED | OUTPATIENT
Start: 2024-11-22

## 2024-11-22 RX ORDER — MORPHINE SULFATE 20 MG/ML
5 SOLUTION ORAL
Status: CANCELLED | OUTPATIENT
Start: 2024-11-22 | End: 2024-11-28

## 2024-11-22 RX ORDER — LORAZEPAM 2 MG/ML
0.5 INJECTION INTRAMUSCULAR
Status: CANCELLED | OUTPATIENT
Start: 2024-11-22 | End: 2024-11-26

## 2024-11-22 RX ORDER — MORPHINE SULFATE 4 MG/ML
4 INJECTION, SOLUTION INTRAMUSCULAR; INTRAVENOUS
Status: CANCELLED | OUTPATIENT
Start: 2024-11-22 | End: 2024-11-28

## 2024-11-22 RX ORDER — LORAZEPAM 2 MG/ML
2 CONCENTRATE ORAL
Status: CANCELLED | OUTPATIENT
Start: 2024-11-22 | End: 2024-11-26

## 2024-11-22 RX ORDER — LORAZEPAM 2 MG/ML
1 INJECTION INTRAMUSCULAR
Status: CANCELLED | OUTPATIENT
Start: 2024-11-22 | End: 2024-11-26

## 2024-11-22 RX ORDER — BISACODYL 5 MG/1
5 TABLET, DELAYED RELEASE ORAL DAILY PRN
Status: CANCELLED | OUTPATIENT
Start: 2024-11-22

## 2024-11-22 RX ORDER — MORPHINE SULFATE 20 MG/ML
20 SOLUTION ORAL
Status: CANCELLED | OUTPATIENT
Start: 2024-11-22 | End: 2024-11-28

## 2024-11-22 RX ORDER — HYDROMORPHONE HYDROCHLORIDE 1 MG/ML
0.25 INJECTION, SOLUTION INTRAMUSCULAR; INTRAVENOUS; SUBCUTANEOUS
Status: CANCELLED | OUTPATIENT
Start: 2024-11-22 | End: 2024-11-30

## 2024-11-22 RX ORDER — ONDANSETRON 2 MG/ML
4 INJECTION INTRAMUSCULAR; INTRAVENOUS EVERY 6 HOURS PRN
Status: CANCELLED | OUTPATIENT
Start: 2024-11-22

## 2024-11-22 RX ORDER — ACETAMINOPHEN 160 MG/5ML
650 SOLUTION ORAL EVERY 4 HOURS PRN
Status: CANCELLED | OUTPATIENT
Start: 2024-11-22

## 2024-11-22 RX ORDER — POLYETHYLENE GLYCOL 3350 17 G/17G
17 POWDER, FOR SOLUTION ORAL DAILY PRN
Status: CANCELLED | OUTPATIENT
Start: 2024-11-22

## 2024-11-22 RX ORDER — HYDROMORPHONE HYDROCHLORIDE 2 MG/ML
1.5 INJECTION, SOLUTION INTRAMUSCULAR; INTRAVENOUS; SUBCUTANEOUS
Status: CANCELLED | OUTPATIENT
Start: 2024-11-22 | End: 2024-11-28

## 2024-11-22 RX ORDER — DIPHENOXYLATE HYDROCHLORIDE AND ATROPINE SULFATE 2.5; .025 MG/1; MG/1
1 TABLET ORAL
Status: CANCELLED | OUTPATIENT
Start: 2024-11-22

## 2024-11-22 RX ORDER — BISACODYL 10 MG
10 SUPPOSITORY, RECTAL RECTAL DAILY PRN
Status: CANCELLED | OUTPATIENT
Start: 2024-11-22

## 2024-11-22 RX ORDER — LORAZEPAM 2 MG/ML
2 INJECTION INTRAMUSCULAR
Status: CANCELLED | OUTPATIENT
Start: 2024-11-22 | End: 2024-11-26

## 2024-11-22 RX ORDER — MORPHINE SULFATE 10 MG/ML
6 INJECTION INTRAMUSCULAR; INTRAVENOUS; SUBCUTANEOUS
Status: CANCELLED | OUTPATIENT
Start: 2024-11-22 | End: 2024-11-28

## 2024-11-22 RX ORDER — HYDROMORPHONE HYDROCHLORIDE 1 MG/ML
0.5 INJECTION, SOLUTION INTRAMUSCULAR; INTRAVENOUS; SUBCUTANEOUS
Status: CANCELLED | OUTPATIENT
Start: 2024-11-22 | End: 2024-11-28

## 2024-11-22 RX ORDER — MORPHINE SULFATE 20 MG/ML
10 SOLUTION ORAL
Status: CANCELLED | OUTPATIENT
Start: 2024-11-22 | End: 2024-11-28

## 2024-11-22 RX ORDER — ACETAMINOPHEN 650 MG/1
650 SUPPOSITORY RECTAL EVERY 4 HOURS PRN
Status: CANCELLED | OUTPATIENT
Start: 2024-11-22

## 2024-11-22 RX ORDER — MORPHINE SULFATE 2 MG/ML
2 INJECTION, SOLUTION INTRAMUSCULAR; INTRAVENOUS
Status: CANCELLED | OUTPATIENT
Start: 2024-11-22 | End: 2024-11-28

## 2024-11-22 RX ORDER — AMOXICILLIN 250 MG
2 CAPSULE ORAL 2 TIMES DAILY PRN
Status: CANCELLED | OUTPATIENT
Start: 2024-11-22

## 2024-11-22 RX ORDER — LORAZEPAM 2 MG/ML
0.5 CONCENTRATE ORAL
Status: CANCELLED | OUTPATIENT
Start: 2024-11-22 | End: 2024-11-26

## 2024-11-22 RX ADMIN — MORPHINE SULFATE 4 MG: 2 INJECTION, SOLUTION INTRAMUSCULAR; INTRAVENOUS at 00:17

## 2024-11-22 RX ADMIN — LORAZEPAM 0.5 MG: 2 INJECTION INTRAMUSCULAR; INTRAVENOUS at 11:55

## 2024-11-22 RX ADMIN — LORAZEPAM 0.5 MG: 2 INJECTION INTRAMUSCULAR; INTRAVENOUS at 00:17

## 2024-11-22 RX ADMIN — MORPHINE SULFATE 4 MG: 2 INJECTION, SOLUTION INTRAMUSCULAR; INTRAVENOUS at 11:55

## 2024-11-22 NOTE — PROGRESS NOTES
Nutrition Services    Patient Name:  Sabas Jefferson  YOB: 1940  MRN: [id number]  Admit Date:  11/7/2024    Pt palliative care and may eat what he wants and enjoys. RD will sign off. Please re-consult if needed.     Electronically signed by:  Mary Lou Qureshi RD  11/22/24 14:14 EST

## 2024-11-22 NOTE — PROGRESS NOTES
Case Management Discharge Note      Final Note: admitted to a [facility] scattered bed on 11/22/24. BRIA Bethea Rn CCP.         Selected Continued Care - Admitted Since 11/7/2024       Destination Coordination complete.      Service Provider Services Address Phone Fax Patient Preferred    [facility] [address], [facility] [address] [phone] --              Durable Medical Equipment    No services have been selected for the patient.                Dialysis/Infusion    No services have been selected for the patient.                Home Medical Care    No services have been selected for the patient.                Therapy    No services have been selected for the patient.                Community Resources    No services have been selected for the patient.                Community & DME    No services have been selected for the patient.                         Final Discharge Disposition Code: 51 - hospice medical facility

## 2024-11-22 NOTE — DISCHARGE SUMMARY
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]  Yes    LEVON (acute kidney injury) [N17.9]  Yes    Acute metabolic encephalopathy [G93.41]  Yes    UTI (urinary tract infection) due to urinary indwelling catheter [T83.511A, N39.0]  Yes    Severe sepsis [A41.9, R65.20]  Yes    Elevated troponin [R79.89]  Yes    Anemia [D64.9]  Yes    Thrombocytopenia [D69.6]  Yes    Perforation of rectum [K63.1]  Unknown    Dementia with behavioral disturbance [F03.918]  Yes    Severe protein-calorie malnutrition [E43]  Yes    Hypernatremia [E87.0]  Yes    Chronic kidney disease, 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        Hospital Course     Mr. Jefferson is a 84 y.o. male with a history of advanced Alzheimer's dementia, chronic disease, chronic Kim, hypertension who presented to [facility] initially complaining of altered mental status.  Please see the admitting history and physical for further details.  He was admitted to the hospital for further evaluation and treatment.  The patient had decreased oral intake prior to admission and was found to be altered with low blood pressure on arrival.  Lab work on admission significant for hyponatremia of 172.  He was given IV fluids and empiric antibiotics for hypotension and concerns for sepsis.  He was admitted to the ICU and nephrology was consulted.  He was suspected to have severe sepsis secondary to UTI in the setting of chronic indwelling catheter.  A CT of his abdomen and pelvis was obtained showing a rectal probe going through the anterior wall of the rectum and located over the left presacral space.   General surgery was consulted and the rectal probe was removed and there were no noted complications from this.  He was weaned off pressors and his sodium levels improved.  He was cleared to move out of the ICU on 11/10 24. No infection was ever clearly identified, it was suspected that his initial presentation was hypovolemic shock from malnutrition. Ultimately, it was felt that the patient would require a feeding tube, but the family didn't feel this was consistent with the patient's wishes and so he was transitioned to comfort care.  While on palliative care, the patient was eating/awake and family decided to reverse goals of care back to full treatment/DNR/DNI.    After reversal back to full treatment the patient underwent further workup which included repeat abdominal imaging which revealed gastric distention and small bowel dilation.  An NGT was placed for bowel decompression, surgery reconsulted and help to manage NG tube.  Dopplers of his left lower extremity revealed a DVT, for which she was initiated on a heparin drip.  He was also noted to have a cool foot which was initially noted when the patient was on pressors in the ICU, a vascular surgery consult was requested and the foot was found to be dead, per vascular surgery, revascularization is not an option/would be futile/the only option would be for amputation or pursue comfort measures.  Multiple conversations were had with the wife and family and with palliative care, the wife with the support of her children has made the decision to transition the patient back to full comfort measures with as needed pain and anxiety medications and to consult with hospice.  Comfort care order set placed, transfer order placed.  Family met with hospice and he has transition to hospice scatter bed    Day of Discharge     Subjective:  Resting in bed, has required IV meds; appears comfortable.     Physical Exam:  Temp:  [98.9 °F (37.2 °C)] 98.9 °F (37.2 °C)  Heart Rate:   [] 82  Resp:  [12] 12  BP: (78-83)/(43-54) 83/54  Body mass index is 19.07 kg/m².  Physical Exam  Constitutional:       General: He is sleeping. He is not in acute distress.     Appearance: He is ill-appearing. He is not toxic-appearing.   Cardiovascular:      Rate and Rhythm: Normal rate and regular rhythm.   Pulmonary:      Effort: Pulmonary effort is normal. No respiratory distress.   Abdominal:      Comments: Abdomen soft, non-tender   Musculoskeletal:         General: No tenderness.      Right lower leg: Edema present.      Left lower leg: Edema present.   Skin:     Comments: Right foot with large blister on dorsum; coolness to the touch of toes, foot/up to ankle         Consultants     Consult Orders (all) (From admission, onward)       Start     Ordered    11/21/24 1208  Inpatient Hospice / [facility] Consult  Once        Specialty:  Hospice and Palliative Medicine  Provider:  (Not yet assigned)    11/21/24 1207    11/20/24 1646  Inpatient Nephrology Consult  Once        Specialty:  Nephrology  Provider:  Trevon Tee MD    11/20/24 1646    11/19/24 2011  Inpatient Palliative Care Team Consult  Once        Provider:  (Not yet assigned)    11/19/24 2010 11/19/24 1543  Inpatient Infectious Diseases Consult  Once        Specialty:  Infectious Diseases  Provider:  Micky Valentine DO    11/19/24 1544    11/19/24 1039  Inpatient Vascular Surgery Consult  Once        Specialty:  Vascular Surgery  Provider:  Kaylah Israel MD    11/19/24 1039    11/18/24 0807  Inpatient General Surgery Consult  Once        Specialty:  General Surgery  Provider:  Ron Fierro MD    11/18/24 0807    11/11/24 1122  Inpatient Hospice Consult  Once        Specialty:  Hospice and Palliative Medicine  Provider:  (Not yet assigned)    11/11/24 1122    11/10/24 1747  Inpatient Hospitalist Consult  Once        Specialty:  Hospitalist  Provider:  Yandel Workman MD    11/10/24 1746    11/10/24 1537   Inpatient Hospitalist Consult  Once        Specialty:  Hospitalist  Provider:  Yandel Workman MD    11/10/24 1536    11/09/24 1313  Inpatient Palliative Care Team Consult  Once        Provider:  (Not yet assigned)    11/09/24 1312    11/08/24 1521  Inpatient Nutrition Consult  Once        Provider:  (Not yet assigned)    11/08/24 1521    11/08/24 1349  Inpatient General Surgery Consult  Once        Specialty:  General Surgery  Provider:  Ron Fierro MD    11/08/24 1348    11/08/24 1348  Inpatient General Surgery Consult  Once,   Status:  Canceled        Specialty:  General Surgery  Provider:  Ron Fierro MD    11/08/24 1348 11/07/24 2148  Nephrology (on -call MD unless specified)  STAT        Specialty:  Nephrology  Provider:  Trevon Tee MD    11/07/24 2147 11/07/24 2044  Nephrology (on -call MD unless specified)  Once,   Status:  Canceled        Specialty:  Nephrology  Provider:  Koby Swain MD    11/07/24 2043 11/07/24 2030  Pulmonology (on-call MD unless specified)  Once        Specialty:  Pulmonary Disease  Provider:  Darren Chin MD    11/07/24 2030                  Procedures     Imaging Results (All)       Procedure Component Value Units Date/Time    US Renal Bilateral [[id number]] Collected: 11/20/24 2051     Updated: 11/20/24 2057    Narrative:      RENAL ULTRASOUND     HISTORY: Acute kidney injury     COMPARISON: CT of the abdomen pelvis 11/17/2024     TECHNIQUE: Gray scale and color doppler ultrasound of the kidneys and  bladder was performed.        FINDINGS:  The right kidney measures 7.5 cm in length, and the left kidney measures  8.8 cm in length. Multiple cysts in the right kidney, largest located in  the upper pole measuring about 3.3 x 3.7 x 4.4 cm. No appreciable  hydronephrosis on the right. There is a 1.4 cm shadowing calculus noted  in the left kidney. There is some left-sided hydronephrosis present.  Kim catheter in the  bladder       Impression:      1. No hydronephrosis is appreciated on the right. There is  hydronephrosis present on the left  2. There is a 1.4 cm shadowing calculus in the left kidney  3. Multiple right kidney cysts     This report was finalized on 11/20/2024 8:54 PM by Dr. King Peterson M.D on Workstation: [id number]       CT Abdomen Pelvis Without Contrast [[id number]] Collected: 11/17/24 2006     Updated: 11/19/24 1723    Narrative:      CT OF THE ABDOMEN AND PELVIS WITHOUT CONTRAST 11/17/2024     HISTORY: Air beneath left hemidiaphragm on chest radiograph from today.     Spiral images were obtained from the lung bases to the symphysis pubis.  No intravenous or oral contrast was given.     There is a trace left pleural effusion with some minimal left lower lobe  atelectasis. Stomach is moderately severely distended with fluid and  air, as well as some particulate probable food material. There also is  moderate gaseous distention of the colon measuring up to 6.7 cm in the  transverse colon. Descending colon is not dilated. There is a moderate  amount of stool in the descending and sigmoid colon, as well as in the  right colon. There is a moderately dilated segment of bowel in the  leftward abdomen, more severely dilated than on the 11/08/2024 study and  this could represent short segment of dilated small bowel.     There is no evidence of pneumoperitoneum.     The liver, gallbladder, spleen and adrenals appear unremarkable, except  for hepatic cysts. Bilateral double-J ureteral stents are seen.  Nonobstructing bilateral renal stones are seen but there is a left renal  cyst.     A left hip prosthesis is seen.       Impression:      1. Moderately severely dilated stomach filled with air, fluid and  particulate debris. There also is a moderate amount of air within the  colon.  2. No evidence of pneumoperitoneum.  3. Short segment of apparently dilated small bowel in the left lower  abdomen, more severely  dilated than on the 11/08/2024 study.  4. Minimal left pleural effusion with some minimal left lower lobe  atelectasis.  5. Hepatic cysts.  6. Bilateral double-J ureteral stents with nonobstructing bilateral  renal stones and small left renal cyst.  7. Consider short-term follow-up CT of the abdomen and pelvis to assess  the stomach and dilated bowel.     Radiation dose reduction techniques were utilized, including automated  exposure control and exposure modulation based on body size.        This report was finalized on 11/19/2024 5:20 PM by Dr. Moreno Hood M.D on Workstation: [id number]       XR Abdomen Flat & Upright [[id number]] Collected: 11/19/24 0732     Updated: 11/19/24 0737    Narrative:      XR ABDOMEN FLAT AND UPRIGHT-11/19/2024     HISTORY: Follow-up gastric distention.     There is moderately large amount of bowel gas in the left upper quadrant  of the abdomen which may be within the stomach and splenic flexure of  the colon. The stomach and colon are slightly more distended than on a  previous study from yesterday but appear less severely dilated than on  the chest radiograph of 11/17/2024.     There is elevation of the left hemidiaphragm and NG tube courses into  the stomach. Bilateral double-J ureteral stents are seen. Bilateral  renal stones are seen.       Impression:      1. Moderately large amount of bowel gas in the left upper quadrant and  leftward abdomen probably in the stomach and colon. The amount of gas in  this region appears slightly greater than on yesterday's study but  appears improved from the 11/17/2024 study after placement of the NG  tube.  2. NG tube tip thought to be within the body of the stomach. Additional  follow-up films recommended.        This report was finalized on 11/19/2024 7:34 AM by Dr. Moreno Hood M.D on Workstation: [id number]       XR Abdomen KUB [[id number]] Collected: 11/18/24 1833     Updated: 11/18/24 1855    Narrative:      XR ABDOMEN  KUB-11/18/2024     HISTORY: NG tube placement.     NG tube is seen to the left of the lower thoracic spine in the upper  abdomen likely in the body of the stomach. There is elevation of the  left hemidiaphragm. Bowel gas pattern is unremarkable. Bilateral  ureteral stents are seen. Bilateral renal stones are seen.       Impression:      1. NG tube tip in the body of the stomach.     This report was finalized on 11/18/2024 6:52 PM by Dr. Moreno Hood M.D on Workstation: [id number]       XR Chest 1 View [[id number]] Collected: 11/17/24 1837     Updated: 11/17/24 2238    Narrative:      XR CHEST 1 VW-11/17/2024     HISTORY: Leukocytosis. Tachycardia.     There is elevation of the left hemidiaphragm with moderately large  amount of air beneath the left hemidiaphragm which could be in a  distended stomach and some may be in the colon as well. It is  conceivable that free air underneath the left hemidiaphragm on this  semierect image could have this appearance. It is noted that some of the  bowel wall is better visualized than typically seen and there could be  some air outlining the bowel wall which can be seen with  pneumoperitoneum.     Mild atelectasis of the left lung base is seen. Lungs are underinflated.  Heart size is difficult to evaluate but may be mildly enlarged.       Impression:      1. Moderately large amount of air beneath the left hemidiaphragm. Is  difficult to determine if this is within distended stomach and colon  versus some pneumoperitoneum. If further evaluation is clinically  indicated follow-up upright or decubitus radiographs or CT of the  abdomen and pelvis could be obtained.  2. FINDINGS were called to the [facility] [employer] station     This report was finalized on 11/17/2024 10:35 PM by Dr. Moreno Hood M.D on Workstation: [id number]       XR Abdomen KUB [[id number]] Collected: 11/11/24 1050     Updated: 11/11/24 1056    Narrative:      XR ABDOMEN KUB-     DATE OF EXAM: 11/11/2024  10:14 AM     INDICATION: verify tube location.     COMPARISON: Radiographs 11/10/2024, 11/9/2024, and 11/8/2024. CT [address].     TECHNIQUE: A single portable supine AP view of the abdomen was obtained.     FINDINGS:  The lower abdomen, far left lateral hemiabdomen, and pelvis are not  included in the field-of-view. Patient rotation. Overlying artifacts.  Enteric tube looped in the left upper quadrant in the region of the  gastric fundus with the tip terminating in the right upper quadrant,  likely at the pylorus or duodenal bulb. Mildly air distended small  enlarged bowel loops. No significantly dilated loops of bowel. No  evidence of pneumatosis. Partially imaged bilateral ureteral stents and  bilateral nephrolithiasis. Elevation of the left hemidiaphragm.       Impression:      Limited study demonstrating an enteric tube looped in the left upper  quadrant in the region of the gastric fundus with the tip terminating in  the right upper quadrant in the region of the pylorus/duodenal bulb.  Could confirm position with CT if clinically indicated.     This report was finalized on 11/11/2024 10:53 AM by Hamlet Celaya MD on  Workstation: [id number]       XR Abdomen KUB [[id number]] Collected: 11/10/24 0828     Updated: 11/10/24 0833    Narrative:      XR ABDOMEN KUB-        INDICATION: NG tube placement     COMPARISON: Abdominal radiograph November 9, 2024     TECHNIQUE: 1 view abdomen     FINDINGS:      Large amount of gas and stool seen in the colon. Mildly prominent  gas-filled small bowel loops over the left abdomen. Feeding tube tip is  over the gastric cardia/body. Bilateral ureteral stents. Bilateral  nephrolithiasis. Elevated left hemidiaphragm.       Impression:      Feeding tube tip is over the gastric cardia/body. Otherwise stable  abdomen.     This report was finalized on 11/10/2024 8:30 AM by Dr. Koby Harrison M.D on Workstation: [id number]       XR Abdomen KUB [[id number]] Collected: 11/09/24 1126      Updated: 11/09/24 1131    Narrative:      XR ABDOMEN KUB-        INDICATION: Feeding tube placement     COMPARISON: Abdominal radiograph November 9, 2024     TECHNIQUE: 1 view abdomen     FINDINGS:      Gas and stool in the colon. Prominent gas-filled suspected colon in the  left upper quadrant. Feeding tube tip, suspected in the gastric cardia  or fundus. Bilateral ureteral stents. Left hip arthroplasty.  Calcifications over the kidneys consistent with nephrolithiasis.       Impression:      Interval advancement of feeding tube with the tip over the gastric  fundus/cardia.     This report was finalized on 11/9/2024 11:28 AM by Dr. Koby Harrison M.D on Workstation: [id number]       XR Abdomen KUB [[id number]] Collected: 11/09/24 0918     Updated: 11/09/24 0924    Narrative:      XR ABDOMEN KUB-        INDICATION: Feeding tube placement     COMPARISON: Abdominal radiograph November 8, 2024     TECHNIQUE: 1 view abdomen     FINDINGS:      Gas and stool in the colon and rectum. Prominent gas filled suspected  colon in the left upper quadrant. Mildly gas distended small bowel loop  in the left upper quadrant measuring 3.8 cm. Bilateral ureteral stents.  Feeding tube tip is in the distal esophagus. Calcifications over the  kidneys, consistent with nephrolithiasis. Elevated left hemidiaphragm       Impression:      Feeding tube tip is in the distal esophagus. Recommend advancement.     This report was finalized on 11/9/2024 9:21 AM by Dr. Koby Harirson M.D on Workstation: [id number]       XR Abdomen KUB [[id number]] Collected: 11/08/24 1630     Updated: 11/08/24 1638    Narrative:      XR ABDOMEN KUB-     INDICATIONS: NG tube placement     TECHNIQUE: FRONTAL VIEW OF THE ABDOMEN     COMPARISON: CT from 11/8/2024     FINDINGS:     In the partly included lower thorax, NG tube projects over the spine,  tip at the expected location of the distal esophagus, recommend  repositioning the tube. The bowel gas pattern is  nonspecific, with  borderline dilated loops of small bowel in the left aspect of the  abdomen, follow-up as indications persist. Bilateral ureteral stents are  partly included. Bilateral nephrolithiasis is suggested.          Impression:         As described.        This report was finalized on 11/8/2024 4:35 PM by Dr. Liam Mittal M.D on Workstation: [id number]       CT Abdomen Pelvis Without Contrast [[id number]] Collected: 11/08/24 1228     Updated: 11/08/24 1323    Addenda:        Call Report: Dr. Swain was notified by telephone of the above  findings on November 8, 2024 at 1:20 p.m.     This report was finalized on 11/8/2024 1:20 PM by Dr. Koby Harrison M.D on Workstation: [id number]     Signed: 11/08/24 1320 by Koby Harrison MD    Narrative:      CT ABDOMEN PELVIS WO CONTRAST-     INDICATION: Acute kidney insufficiency. Ureteral stents.     COMPARISON: None     TECHNIQUE:  Routine CT abdomen/pelvis without IV contrast. Coronal and sagittal  reformats. Radiation dose reduction techniques were utilized, including  automated exposure control and exposure modulation based on body size.     FINDINGS:      Lung bases: Air trapping seen in the basilar left lower lobe base.  Coronary artery atherosclerotic calcifications.     ABDOMEN: Elevated right hemidiaphragm. Fluid attenuating cyst seen in  segment 5 of the right hepatic lobe measures 4.2 cm. Suspect small cysts  at the hepatic dome. Atrophy of the left hepatic lobe. Possible  gallbladder sludge. No gallbladder wall thickening or surrounding  inflammation. No biliary ductal dilatation. Spleen is normal in size.  Moderate pancreatic atrophy. No pancreatic ductal dilatation or mass  identified. No adrenal nodules.     Right kidney: Isoattenuating mass in the superior pole, series 2, axial  image 61, measures 2.5 cm, incompletely characterized without contrast.  Small calcifications seen in the posterior mid kidney, series 2, axial  mage 66, with  some small adjacent calcifications in the posterior  pararenal space. Large urolithiasis centered in an inferior pole calyx,  series 2, axial mage 72, measures 16 mm. Moderate to severe  hydronephrosis. Ureteral stent present. Ureterolithiasis seen in the  distal ureter, series 2, axial image 130, measures 3 x 3 x 3 mm, located  approximately 2.5 cm above the UVJ.     Left kidney: Fluid attenuating cyst in the mid kidney measures 2.8 cm.  Nonobstructing nephrolithiasis. For example, nonobstructing  nephrolithiasis in the inferior pole, series 2, axial mage 68, measures  7 mm. Large urolithiasis centered in the inferior pole calyx, series 2,  axial image 73, measures 11 mm. Moderate to severe hydronephrosis.  Ureteral stent seen. No ureterolithiasis.     Pelvis: Streak artifact from total left hip arthroplasty limits  evaluation of the pelvis. Prostate is not well seen, may be obscured by  artifact versus prostatectomy. Kim in the bladder. Gas in the bladder  lumen is most likely iatrogenic. Bladder wall thickening, with under  distention. No bladder calculus identified.     Bowel: Stomach is under distended. Small bowel anastomosis seen in the  left upper quadrant. Small catheter seen in the rectum, with the  catheter extending across the anteroinferior wall of the rectum with the  tip of the catheter in the left presacral space on series 2, axial mage  137. Sigmoid colon suture line seen. Possible polypectomy clip at the  splenic flexure of the colon. Moderate gas and stool in the ascending  and transverse colon. Appendix not identified though no secondary  findings of appendicitis. Partial small bowel resection.     Abdominal wall: Rectus diastases. Abdominal and pelvic wall scarring.  Left groin scarring.     Retroperitoneum: No lymphadenopathy.     Vasculature: No abdominal aortic aneurysm. Mild aortoiliac  atherosclerotic calcification.     Osseous structures: Total left hip arthroplasty. No destructive  osseous  lesions. Lower lumbar facet degenerative arthropathy.       Impression:         1. Rectal catheter penetrates the anterior inferior rectal wall with the  tip of the catheter seen in the left presacral space. Clinical  correlation for the catheter is needed.  2. Isoattenuating mass in the superior pole right kidney is incompletely  characterized without contrast. Recommend dedicated renal CT or renal  MRI to help distinguish between a hemorrhagic/proteinaceous cyst and a  solid mass.  3. Bilateral nonobstructing nephrolithiasis with large urolithiasis seen  in the inferior pole calyces.  4. Moderate to severe bilateral hydronephrosis with ureteral stents in  place. Ureterolithiasis seen in the distal right ureter.  5. Bladder wall thickening. May be related underdistention or cystitis  in the appropriate clinical and laboratory setting.     This report was finalized on 11/8/2024 12:54 PM by Dr. Koby Harrison M.D on Workstation: [id number]       CT Head Without Contrast [[id number]] Collected: 11/07/24 2043     Updated: 11/07/24 2119    Narrative:      CT HEAD WITHOUT CONTRAST     HISTORY: Altered mental status     TECHNIQUE:  Head CT includes axial imaging from the base of skull to the  vertex without intravenous contrast. Radiation dose reduction techniques  were utilized, including automated exposure control and exposure  modulation based on body size.     COMPARISON: None     FINDINGS: There is diffuse cerebral and cerebellar atrophy associated  with enlargement of the sulci, prominent extra-axial spaces,  ventriculomegaly, enlargement of the sylvian fissures. There is a  chronic infarction involving the anterior superior right frontal lobe  where there is cortical thinning and encephalomalacia. There are no  abnormal areas of increased attenuation intra-axially to suggest  hemorrhage. No extra-axial fluid collection is observed. Prominent  anterior bifrontal extra-axial spaces are present due to  atrophy and  potential small chronic subdural hygromas. No mass effect or shift of  the midline structures. Intracranial atherosclerotic calcifications are  present.       Impression:      Diffuse cerebral and cerebellar atrophy associated with  ventricular enlargement. Prominent anterior bifrontal extra-axial spaces  due to atrophy or potential small chronic subdural hygromas. 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 No evidence for acute intracranial abnormality. There are no  previous studies for comparison.     This report was finalized on 11/7/2024 9:16 PM by Jhonny Crowder M.D  on Workstation: [id number]       XR Chest 1 View [[id number]] Collected: 11/1940     Updated: 11/07/24 2009    Narrative:      CHEST SINGLE VIEW     HISTORY: 84 years of age, Male.  AMS     COMPARISON: Two-view chest 06/16/2016     FINDINGS: Chronic elevation of left diaphragm. There is widening of the  mediastinum that appears to be related to tortuosity and suspected  enlargement of the thoracic aorta. Patient is also rotated to the right.  Lungs appear clear of focal airspace disease and there is no evidence  for pulmonary edema or pleural effusion. Cardiac monitoring leads are  present.     Mild to moderate gas distention of bowel loops in the upper abdomen.  Bilateral ureteral stents are present. There is a 12 x 10 mm right renal  stone.       Impression:      1. Widening of the superior mediastinum appears to be related to  tortuosity and suspected enlargement of the thoracic aorta. This is  without definite change allowing for patient rotation to the right  though evaluation for thoracic aortic enlargement could be performed  with CT. Lungs appear clear. There is chronic elevation of the left  hemidiaphragm.   2. Bowel loops in the upper abdomen.  3. Bilateral ureteral stents. 12 mm right renal stone.     This report was finalized on 11/7/2024 8:06 PM by Jhonny Crowder  "M.D  on Workstation: [id number]               Pertinent Labs     Results from last 7 days   Lab Units 11/21/24  0545 11/21/24  0008 11/20/24  1606 11/20/24  0500 11/19/24  0946 11/19/24  0235 11/19/24  0038 11/18/24  0421   WBC 10*3/mm3 20.69*  --   --  20.68*  --  17.51*  --  15.10*   HEMOGLOBIN g/dL 6.8* 7.1* 7.6* 7.9*   < > 8.4*   < > 8.0*   PLATELETS 10*3/mm3 320  --   --  334  --  343  --  268    < > = values in this interval not displayed.     Results from last 7 days   Lab Units 11/21/24  0545 11/20/24  1606 11/19/24 0235 11/18/24  0421   SODIUM mmol/L 144 141 137 133*   POTASSIUM mmol/L 3.6 4.1 4.0 4.2   CHLORIDE mmol/L 115* 115* 108* 106   CO2 mmol/L 15.1* 11.5* 15.0* 16.8*   BUN mg/dL 32* 37* 34* 35*   CREATININE mg/dL 1.58* 1.80* 1.58* 1.59*   GLUCOSE mg/dL 95 81 91 97   Estimated Creatinine Clearance: 30.7 mL/min (A) (by C-G formula based on SCr of 1.58 mg/dL (H)).  Results from last 7 days   Lab Units 11/21/24  0545 11/20/24  1606 11/19/24  0235 11/18/24  0421 11/17/24  1433   ALBUMIN g/dL 1.7* 1.5* 1.9* 1.7* 1.6*   BILIRUBIN mg/dL  --  0.4 0.5 0.7 0.8   ALK PHOS U/L  --  250* 386* 388* 173*   AST (SGOT) U/L  --  23 125* 407* 126*   ALT (SGPT) U/L  --  70* 151* 188* 43*     Results from last 7 days   Lab Units 11/21/24  0545 11/20/24  1606 11/19/24 0235 11/18/24  0421 11/17/24  1433   CALCIUM mg/dL 7.6* 8.0* 7.9* 7.4* 7.4*   ALBUMIN g/dL 1.7* 1.5* 1.9* 1.7* 1.6*   MAGNESIUM mg/dL 2.1  --   --   --  2.2   PHOSPHORUS mg/dL 4.4  --   --   --  2.4*               Invalid input(s): \"LDLCALC\"  Results from last 7 days   Lab Units 11/17/24  1705 11/17/24  1625 11/17/24  1618   BLOODCX   --  No growth at 5 days No growth at 5 days   URINECX  >100,000 CFU/mL Enterococcus faecium, VRE*  --   --        Test Results Pending at Discharge       Discharge Details        Discharge Medications        ASK your doctor about these medications        Instructions Start Date   leuprolide 45 MG kit injection  Commonly " known as: LUPRON   Intramuscular      multivitamin with minerals tablet tablet   1 tablet, Oral, Daily      vitamin B-12 100 MCG tablet  Commonly known as: CYANOCOBALAMIN   50 mcg, Oral               No Known Allergies      Discharge Disposition:  Hospice/Medical Facility ([facility])    Discharge Diet:  Diet Order   Procedures    Diet: Regular/House; Texture: Pureed (NDD 1); Fluid Consistency: Thin (IDDSI 0)       Discharge Activity:       CODE STATUS:    Code Status and Medical Interventions: No CPR (Do Not Attempt to Resuscitate); Comfort Measures   Ordered at: 11/21/24 1207     Code Status (Patient has no pulse and is not breathing):    No CPR (Do Not Attempt to Resuscitate)     Medical Interventions (Patient has pulse or is breathing):    Comfort Measures       No future appointments.   Contact information for follow-up providers       Koby Holbrook MD .    Specialty: Internal Medicine  Contact information:  [address]  [address] [address] [phone]                       Contact information for after-discharge care       Destination       [facility] .    Service: Inpatient Hospice  Contact information:  [address]  [facility] [address] [phone]                                   Time Spent on Discharge:  I spent greater than 30 minutes on this discharge activity which included: face-to-face encounter with the patient, reviewing the data in the system, coordination of the care with the nursing staff as well as consultants, documentation, and entering orders.       Lillie Latif MD  [facility]  11/22/24  16:48 EST

## 2024-11-22 NOTE — PLAN OF CARE
Goal Outcome Evaluation:  Plan of Care Reviewed With: spouse, child        Progress: declining  Outcome Evaluation: Pt transferred to  for palliative care. Pt was lethargic but responsive upon arrival. Pt had s/s of pain when repositioned and cleaned up. Pt's sheets were wet d/t weeping edema. Attempted to give Pt IV morphine but IV site was no longer working. IV nurse placed new IV and morphine was given with good relief. Pt's wife and dtr at bedside this evening. Discussed palliative medications and goals of care. Discussed Pt's diet and family encouraged to let Pt eat what he enjoys if he wants to eat. Family states he enjoys sweets. Pt's wife and dtr seems understanding and accepting at this time.

## 2024-11-22 NOTE — PLAN OF CARE
Goal Outcome Evaluation:  Plan of Care Reviewed With: family        Progress: declining  Outcome Evaluation: PPS 10%. Non-verbal. Grimacing and restless at times. Morphine 4mg and Ativan 0.5mg IV given x2. F/C. Rt lower leg black. Family at bedside at beginning of shift.

## 2024-11-22 NOTE — PLAN OF CARE
Goal Outcome Evaluation:  Plan of Care Reviewed With: spouse, child        Progress: declining  Outcome Evaluation: Pt unable to intake food safely today. Family arrived this evening--attempted to feed patient but held mouth shut. Education provided not to force food if pt is not awake enough. Morphine and ativan x1 for bath d/t signs of discomfort. F/C intact. wound care provided to coccyx. FLipped to [facility]

## 2024-11-22 NOTE — PROGRESS NOTES
Discharge Planning Assessment  [facility]     Patient Name: Sabas Jefferson  MRN: [id number]  Today's Date: 11/22/2024    Admit Date: 11/7/2024    Plan: palliative   Discharge Needs Assessment    No documentation.                  Discharge Plan       Row Name 11/22/24 1509       Plan    Plan palliative    Plan Comments The patient transferred to [facility] from [address] [place] [address] on 11/21/24 @ 15:22 for palliative care. [facility] to meet with the patient and family today at 16:30 today. CCP will continue to follow for any needs. BRIA Bethea Rn CCP.                  Continued Care and Services - Admitted Since 11/7/2024       Destination       Service Provider Request Status Services Address Phone Fax Patient Preferred    [facility] Accepted -- [address] [place] [address] [phone] --                  Expected Discharge Date and Time       Expected Discharge Date Expected Discharge Time    Nov 25, 2024            Demographic Summary    No documentation.                  Functional Status    No documentation.                  Psychosocial    No documentation.                  Abuse/Neglect    No documentation.                  Legal    No documentation.                  Substance Abuse    No documentation.                  Patient Forms    No documentation.                     Beata Bethea, RN

## 2024-11-22 NOTE — CONSULTS
[facility] admit 11/22/24  [facility] ID : [id number]    Primary diagnosis: ICD 10: G31.1    Pt is needing symptom management for pain and restlessness.     Met with pt spouse and dgtr and provided explanation of services. Consents signed and written material provided.    Thank you for the referral.    Amanda Lopez RN  [facility]  [phone]

## 2024-11-23 NOTE — PLAN OF CARE
Goal Outcome Evaluation:              Outcome Evaluation: Sleeping between care. Appears comfortable at rest. No prn medication needed this shift. Will continue palliative care.

## 2024-11-23 NOTE — H&P
Patient Name:  Sabas Jefferson  YOB: 1940  MRN:  [id number]  Admit Date:  11/22/2024  Patient Care Team:  Koby Holbrook MD as PCP - General (Internal Medicine)  Sylvester Sparks MD as Consulting Physician (Urology)      Subjective   History Present Illness     No chief complaint on file.    History of Present Illness  Mr. Jefferson is a 84 y.o. with a history of advanced Alzheimer's dementia, chronic disease, chronic Kim, hypertension that presents to [facility] complaining of altered mental status. The patient had decreased oral intake prior to admission and was found to be altered with low blood pressure on arrival.  Lab work on admission significant for hyponatremia of 172.  He was given IV fluids and empiric antibiotics for hypotension and concerns for sepsis.  He was admitted to the ICU and nephrology was consulted.  He was suspected to have severe sepsis secondary to UTI in the setting of chronic indwelling catheter.  A CT of his abdomen and pelvis was obtained showing a rectal probe going through the anterior wall of the rectum and located over the left presacral space.  General surgery was consulted and the rectal probe was removed and there were no noted complications from this.  He was weaned off pressors and his sodium levels improved.  He was cleared to move out of the ICU on 11/10 24. No infection was ever clearly identified, it was suspected that his initial presentation was hypovolemic shock from malnutrition. Ultimately, it was felt that the patient would require a feeding tube, but the family didn't feel this was consistent with the patient's wishes and so he was transitioned to comfort care.  While on palliative care, the patient was eating/awake and family decided to reverse goals of care back to full treatment/DNR/DNI.               After reversal back to full treatment the patient underwent further workup which included repeat abdominal imaging which  "revealed gastric distention and small bowel dilation.  An NGT was placed for bowel decompression, surgery reconsulted and help to manage NG tube.  Dopplers of his left lower extremity revealed a DVT, for which she was initiated on a heparin drip.  He was also noted to have a cool foot which was initially noted when the patient was on pressors in the ICU, a vascular surgery consult was requested and the foot was found to be dead, per vascular surgery, revascularization is not an option/would be futile/the only option would be for amputation or pursue comfort measures.  Multiple conversations were had with the wife and family and with palliative care, the wife with the support of her children has made the decision to transition the patient back to full comfort measures with as needed pain and anxiety medications and to consult with hospice.  Comfort care order set placed, transfer order placed.  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  Social History     Tobacco Use    Smoking status: Former     Current packs/day: 1.00     Average packs/day: 1 pack/day for 10.0 years (10.0 ttl pk-yrs)     Types: Cigarettes    Smokeless tobacco: Never   Substance Use Topics    Alcohol use: Yes     Alcohol/week: 7.0 standard drinks of alcohol     Types: 7 Glasses of wine per " week    Drug use: No     Current Facility-Administered Medications on File Prior to Encounter   Medication Dose Route Frequency Provider Last Rate Last Admin    [DISCONTINUED] acetaminophen (TYLENOL) 160 MG/5ML oral solution 650 mg  650 mg Oral Q4H PRLillie Raymundo MD        [DISCONTINUED] acetaminophen (TYLENOL) suppository 650 mg  650 mg Rectal Q4H PRLillie Raymundo MD        [DISCONTINUED] acetaminophen (TYLENOL) tablet 650 mg  650 mg Oral Q4H PRN Lillie Latif MD        [DISCONTINUED] bisacodyl (DULCOLAX) EC tablet 5 mg  5 mg Oral Daily PRN Marko Tam MD        [DISCONTINUED] bisacodyl (DULCOLAX) suppository 10 mg  10 mg Rectal Daily PRN Marko Tam MD        [DISCONTINUED] diphenoxylate-atropine (LOMOTIL) 2.5-0.025 MG per tablet 1 tablet  1 tablet Oral Q2H PRN Lillie Latif MD        [DISCONTINUED] HYDROmorphone (DILAUDID) injection 0.25 mg  0.25 mg Intravenous Q2H PRLillie Raymundo MD        [DISCONTINUED] HYDROmorphone (DILAUDID) injection 0.5 mg  0.5 mg Intravenous Q1H PRLillie Raymundo MD        [DISCONTINUED] HYDROmorphone (DILAUDID) injection 1 mg  1 mg Intravenous Q1H Lillie Hollis MD        [DISCONTINUED] HYDROmorphone (DILAUDID) injection 1.5 mg  1.5 mg Intravenous Q1H PRLillie Raymundo MD        [DISCONTINUED] LORazepam (ATIVAN) 2 MG/ML concentrated solution 0.5 mg  0.5 mg Sublingual Q1H Lillie Hollis MD        [DISCONTINUED] LORazepam (ATIVAN) 2 MG/ML concentrated solution 1 mg  1 mg Sublingual Q1H PRLillie Raymundo MD        [DISCONTINUED] LORazepam (ATIVAN) 2 MG/ML concentrated solution 2 mg  2 mg Sublingual Q1H PRLillie Raymundo MD        [DISCONTINUED] LORazepam (ATIVAN) injection 0.5 mg  0.5 mg Intravenous Q1H PRLillie Raymunod MD   0.5 mg at 11/22/24 1155    [DISCONTINUED] LORazepam (ATIVAN) injection 0.5 mg  0.5 mg Subcutaneous Q1H PRN Lillie Latif MD        [DISCONTINUED] LORazepam (ATIVAN)  injection 1 mg  1 mg Intravenous Q1H PRN Lillie Latif MD   1 mg at 11/21/24 1407    [DISCONTINUED] LORazepam (ATIVAN) injection 1 mg  1 mg Subcutaneous Q1H PRLillie Raymundo MD        [DISCONTINUED] LORazepam (ATIVAN) injection 2 mg  2 mg Intravenous Q1H Lillie Hollis MD        [DISCONTINUED] LORazepam (ATIVAN) injection 2 mg  2 mg Subcutaneous Q1H PRLillie Raymundo MD        [DISCONTINUED] morphine concentrated solution 10 mg  10 mg Sublingual Q1H Lillie Hollis MD        [DISCONTINUED] morphine concentrated solution 20 mg  20 mg Sublingual Q1H Lillie Hollis MD        [DISCONTINUED] morphine concentrated solution 5 mg  5 mg Sublingual Q1H Lillie Hollis MD        [DISCONTINUED] morphine injection 2 mg  2 mg Intravenous Q1H Lillie Hollis MD   2 mg at 11/21/24 1552    [DISCONTINUED] morphine injection 4 mg  4 mg Intravenous Q1H PRLillie Raymundo MD   4 mg at 11/22/24 1155    [DISCONTINUED] Morphine injection 6 mg  6 mg Intravenous Q1H PRLillie Raymundo MD        [DISCONTINUED] ondansetron (ZOFRAN) injection 4 mg  4 mg Intravenous Q6H PRN Marko Tam MD        [DISCONTINUED] ondansetron ODT (ZOFRAN-ODT) disintegrating tablet 4 mg  4 mg Oral Q6H PRN Marko Tam MD        [DISCONTINUED] polyethylene glycol (MIRALAX) packet 17 g  17 g Oral Daily PRN Marko Tam MD        [DISCONTINUED] Polyvinyl Alcohol-Povidone PF (ARTIFICIAL TEARS) 1.4-0.6 % ophthalmic solution 1 drop  1 drop Both Eyes Q30 Min PRN Leticia Fuentes APRN        [DISCONTINUED] Polyvinyl Alcohol-Povidone PF (ARTIFICIAL TEARS) 1.4-0.6 % ophthalmic solution 1 drop  1 drop Both Eyes Q30 Min PRN Lillie Latif MD        [DISCONTINUED] sennosides-docusate (PERICOLACE) 8.6-50 MG per tablet 2 tablet  2 tablet Oral BID PRN Marko Tam MD         Current Outpatient Medications on File Prior to Encounter   Medication Sig Dispense Refill    leuprolide acetate, 6 months,  (LUPRON) 45 MG kit kit Inject  into the shoulder, thigh, or buttocks.      Multiple Vitamins-Minerals (MULTIVITAMIN ADULT PO) Take 1 tablet by mouth daily.      vitamin B-12 (CYANOCOBALAMIN) 100 MCG tablet Take 50 mcg by mouth.       No Known Allergies    Objective    Objective     Vital Signs  Temp:  [98.9 °F (37.2 °C)] 98.9 °F (37.2 °C)  Heart Rate:  [] 82  Resp:  [12] 12  BP: (78-83)/(43-54) 83/54  SpO2:  [93 %] 93 %  on   ;   Device (Oxygen Therapy): room air  There is no height or weight on file to calculate BMI.    Physical Exam  Constitutional:       General: He is not in acute distress.     Appearance: He is ill-appearing. He is not toxic-appearing.   Cardiovascular:      Rate and Rhythm: Regular rhythm. Tachycardia present.      Heart sounds: Normal heart sounds.   Pulmonary:      Effort: Pulmonary effort is normal.      Breath sounds: Normal breath sounds.   Abdominal:      General: Bowel sounds are normal.      Palpations: Abdomen is soft.      Comments: Abdomen soft, non-tender   Musculoskeletal:         General: No tenderness.      Right lower leg: Edema present.      Left lower leg: Edema present.   Skin:     Comments: Right foot with large blister on dorsum; coolness to the touch of toes, foot/up to ankle   Neurological:      General: No focal deficit present.      Mental Status: Mental status is at baseline. He is disoriented.         Results Review:  I reviewed the patient's new clinical results.  I reviewed the patient's new imaging results and agree with the interpretation.  I reviewed the patient's other test results and agree with the interpretation  I personally viewed and interpreted the patient's EKG/Telemetry data  Discussed with ED provider.    Lab Results (last 24 hours)       ** No results found for the last 24 hours. **            Imaging Results (Last 24 Hours)       ** No results found for the last 24 hours. 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       Mr. Jefferson is a 84 y.o.  with a history of advanced Alzheimer's dementia, chronic disease, chronic Kim, hypertension who presents with altered mental status. He had a prolonged and complicated hospital course (see above) but ultimately clinically declined. His family met with palliative care and hospice and he has transitioned to full comfort measures and has qualified for a hospice scatter bed.      Continue IV pain and anxiety medications. He also has antisecretory agents available.    I discussed the patient's findings and my recommendations with nursing staff.    Anticipate the patient to remain at [facility] for EOL care.        Lillie Latif MD  [facility]  11/22/24  19:45 EST

## 2024-11-23 NOTE — PROGRESS NOTES
Name: Sabas Jefferson ADMIT: 2024   : 1940  PCP: Koby Holbrook MD    MRN: [id number] LOS: 1 days   AGE/SEX: 84 y.o. male  ROOM: [facility]/     Subjective   Subjective   Resting in bed, discussed with RN.  No concerns noted per nursing staff.  Family was able to visit at bedside last night.       Objective   Objective   Vital Signs  Temp:  [95.2 °F (35.1 °C)] 95.2 °F (35.1 °C)  Heart Rate:  [75-82] 75  Resp:  [12] 12  BP: (83-94)/(54-59) 94/59  SpO2:  [99 %] 99 %  on   ;   Device (Oxygen Therapy): room air  There is no height or weight on file to calculate BMI.  Physical Exam  Constitutional:       General: He is not in acute distress.     Appearance: He is ill-appearing. He is not toxic-appearing.   Cardiovascular:      Rate and Rhythm: Normal rate and regular rhythm.   Pulmonary:      Effort: Pulmonary effort is normal.      Breath sounds: Normal breath sounds.   Abdominal:      General: Bowel sounds are normal.      Palpations: Abdomen is soft.      Comments: Abdomen soft, non-tender   Skin:     Comments: Right foot with large blister on dorsum; coolness to the touch of toes, foot/up to ankle         Results Review     I reviewed the patient's new clinical results.  Results from last 7 days   Lab Units 24  0545 24  0008 24  1606 24  0500 24  0946 24  0235 24  0038 24  0421   WBC 10*3/mm3 20.69*  --   --  20.68*  --  17.51*  --  15.10*   HEMOGLOBIN g/dL 6.8* 7.1* 7.6* 7.9*   < > 8.4*   < > 8.0*   PLATELETS 10*3/mm3 320  --   --  334  --  343  --  268    < > = values in this interval not displayed.     Results from last 7 days   Lab Units 24  0545 24  1606 24  0235 24  0421   SODIUM mmol/L 144 141 137 133*   POTASSIUM mmol/L 3.6 4.1 4.0 4.2   CHLORIDE mmol/L 115* 115* 108* 106   CO2 mmol/L 15.1* 11.5* 15.0* 16.8*   BUN mg/dL 32* 37* 34* 35*   CREATININE mg/dL 1.58* 1.80* 1.58* 1.59*   GLUCOSE mg/dL 95 81 91 97   Estimated  "Creatinine Clearance: 30.7 mL/min (A) (by C-G formula based on SCr of 1.58 mg/dL (H)).  Results from last 7 days   Lab Units 11/21/24  0545 11/20/24  1606 11/19/24  0235 11/18/24  0421 11/17/24  1433   ALBUMIN g/dL 1.7* 1.5* 1.9* 1.7* 1.6*   BILIRUBIN mg/dL  --  0.4 0.5 0.7 0.8   ALK PHOS U/L  --  250* 386* 388* 173*   AST (SGOT) U/L  --  23 125* 407* 126*   ALT (SGPT) U/L  --  70* 151* 188* 43*     Results from last 7 days   Lab Units 11/21/24  0545 11/20/24  1606 11/19/24 0235 11/18/24  0421 11/17/24  1433   CALCIUM mg/dL 7.6* 8.0* 7.9* 7.4* 7.4*   ALBUMIN g/dL 1.7* 1.5* 1.9* 1.7* 1.6*   MAGNESIUM mg/dL 2.1  --   --   --  2.2   PHOSPHORUS mg/dL 4.4  --   --   --  2.4*     Results from last 7 days   Lab Units 11/20/24  1804 11/17/24  1433   PROCALCITONIN ng/mL  --  0.48*   LACTATE mmol/L 1.1  --    No results found for: \"COVID19\"  Glucose   Date/Time Value Ref Range Status   11/21/2024 0552 89 70 - 130 mg/dL Final   11/20/2024 2305 106 70 - 130 mg/dL Final   11/20/2024 1615 81 70 - 130 mg/dL Final   11/20/2024 1109 79 70 - 130 mg/dL Final       US Renal Bilateral  Narrative: RENAL ULTRASOUND     HISTORY: Acute kidney injury     COMPARISON: CT of the abdomen pelvis 11/17/2024     TECHNIQUE: Gray scale and color doppler ultrasound of the kidneys and  bladder was performed.        FINDINGS:  The right kidney measures 7.5 cm in length, and the left kidney measures  8.8 cm in length. Multiple cysts in the right kidney, largest located in  the upper pole measuring about 3.3 x 3.7 x 4.4 cm. No appreciable  hydronephrosis on the right. There is a 1.4 cm shadowing calculus noted  in the left kidney. There is some left-sided hydronephrosis present.  Kim catheter in the bladder     Impression: 1. No hydronephrosis is appreciated on the right. There is  hydronephrosis present on the left  2. There is a 1.4 cm shadowing calculus in the left kidney  3. Multiple right kidney cysts     This report was finalized on 11/20/2024 " 8:54 PM by Dr. 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       Mr. Jefferson is a 84 y.o.  with a history of advanced Alzheimer's dementia, chronic disease, chronic Kim, hypertension who presents with altered mental status. He had a prolonged and complicated hospital course but ultimately continued to clinically declined. His family met with palliative care and hospice and he has transitioned to full comfort measures and has qualified for a hospice scatter bed.       Continue IV pain and anxiety medications. He also has antisecretory agents available.     I discussed the patient's findings and my recommendations with nursing staff.     Anticipate the patient to remain at [facility] for EOL care.         Lillie Latif MD  [facility]  11/23/24  10:34 EST    I wore protective equipment throughout this patient encounter including gloves.  Hand hygiene was performed before donning protective equipment and after removal when leaving the room.         Copied text in this note has been reviewed and is accurate as of 11/23/24.

## 2024-11-23 NOTE — PLAN OF CARE
Goal Outcome Evaluation:  Plan of Care Reviewed With: family        Progress: declining  Outcome Evaluation: PPS 30%. Pt has been very alert today but nonverbal. Pt's family feeding patient and he is eating very well. Some coughing with eating. Pt given morphine x1 for s/s of discomfort with good relief. Pt is resting comfortably at this time.

## 2024-11-24 NOTE — NURSING NOTE
Review of visit: patient is an 83 YO M with a primary diagnosis of SDOB. Admitted to [facility] for GIP for mgmt. of pain, SOA & EOL & symptom mgmt. care.   Isolation: Contact VRE  PPS: 30%  Medications in last 24 hours: IV Morphine 4mg x2  Recommendations:   Continue to monitor for signs of decline & provide comfort measures. Contact [facility] with any questions/concerns & at TOD.   Assessment:   Patient is sitting up in bed, sleeping at time of visit but awakens to name being called but dozes back off easily, patient is non-verbal, slightly grimacing, on RA RR even, mildly labored, lungs CL, BS +, L pedal palpable but weak, R pedal ARNULFO due to dressing in place/foot & ankle area wrapped in kerlix. Patient has a mepilex on L shin & per Epic has a blister on R fight & small stage 2 on L hip area. FC in place with sree urine noted in bag. Updated SRN who confirms medicating patient now.   Collaboration:   No family at bedside, will reach out via phone to provide  condition update. Updated staff RN Taran, recommendations provided as appropriate.   Disposition:  Patient meets GIP criteria d/t frequent administration & continued titration of IV meds to achieve/maintain symptom mgmt.. Patient appears unstable for transport, requiring increasing symptom mgmt. & frequent nursing interventions. If patient stabilizes & needs to transition to a lower level of care, placement may be needed due to increased daily care needs/will likely return home with family & [facility] support. Will continue [facility] RN visits to monitor for changes, assess needs & provide support.   Please reach out with any questions/concerns. Thank you for allowing us the opportunity to participate in patient's care.     Kristin Frost RN, BSN  Scatter Bed Team  [facility]

## 2024-11-24 NOTE — PLAN OF CARE
Goal Outcome Evaluation:  Plan of Care Reviewed With: family        Progress: declining  Outcome Evaluation: PPS 30%. Pt awake, alert but nonverbal. Pt eating well today but requires full feed assistance. Pt given morphine for s/s of pain. Pt is current resting comfortably.

## 2024-11-24 NOTE — NURSING NOTE
Review of visit: patient is an 85 YO M with a primary diagnosis of SDOB. Admitted to [facility] for GIP for mgmt. of pain, SOA & EOL & symptom mgmt. care.   Isolation: Contact VRE  PPS: 30%  Medications in last 24 hours: IV Morphine 4mg x2  Recommendations:   Continue to monitor for signs of decline & provide comfort measures. Contact [facility] with any questions/concerns & at TOD.   Assessment:   Patient is lying in bed, sleeping at time of visit, did not awaken during assessment. SRN & NA report patient ate approx. 85-90% of breakfast & lunch. Patient is on RA, RR even, mildly labored, lungs CD, BS +, L pedal palpable but weak, R pedal ARNULFO due to dressing in place/foot & ankle area wrapped in kerlix. Patient has a mepilex on L shin & per Epic has a blister on R fight & small stage 2 on L hip area. SRN reports patient has an unstageable wound on coccyx, ARNULFO at time of visit. FC in place with sree urine noted in bag. Patient appears comfortable at time of visit.  Collaboration:   Spoke with family via phone, condition update provided including disease progression, symptom mgmt. & patient's condition, training provided. Family V/U of patient's condition and all questions answered. Updated staff RN Mahamed, recommendations provided as appropriate.   Disposition:  Patient meets GIP criteria d/t frequent administration & continued titration of IV meds to achieve/maintain symptom mgmt.. Patient appears unstable for transport, requiring increasing symptom mgmt. & frequent nursing interventions. If patient stabilizes & needs to transition to a lower level of care, placement may be needed due to increased daily care needs. Will continue [facility] RN visits to monitor for changes, assess needs & provide support.   Please reach out with any questions/concerns. Thank you for allowing us the opportunity to participate in patient's care.     Kristin Frost RN, BSN  Scatter Bed Team  [facility]

## 2024-11-24 NOTE — PROGRESS NOTES
Palliative Care/Hospice Follow Up Note       LOS: 2 days   Patient Care Team:  Koby Holbrook MD as PCP - General (Internal Medicine)  Sylvester Sparks MD as Consulting Physician (Urology)    Chief Complaint: Restlessness confusion poor intake    Interval History:     He is awake this morning and nonverbal.  No issues per nursing staff.  Medicated periodically for pain but resting comfortably between care.  Eating at times  Palliative Performance Scale  Palliative Performance Scale Score: 30%  [place] Symptom Assessment System Revised  Pain Score: no pain   ESAS Tiredness Score: 8  ESAS Nausea Score: No nausea  ESAS Depression Score: unable to assess  ESAS Anxiety Score: No anxiety  ESAS Drowsiness Score: 6  ESAS Lack of Appetite Score: 5  ESAS Wellbeing Score: 4  ESAS Dyspnea Score: No shortness of breath  ESAS Other Problem Score: Best possible response  ESAS Source of Information: healthcare professional caregiver  ESAS Intervention: other (see comment) (no intervention needed)  ESAS Intervention Response: tolerated      Review of Systems: Review of systems could not be obtained due to patient confusion.       Vital Signs  Temp:  [97.6 °F (36.4 °C)-98 °F (36.7 °C)] 98 °F (36.7 °C)  Heart Rate:  [107-123] 123  Resp:  [16] 16  BP: (86-96)/(59-66) 96/66  Device (Oxygen Therapy): room air SpO2:  [96 %] 96 %    Physical Exam:  General Appearance:    awake and in no acute distress, chronic ill-appearing     Throat:   No oral lesions, no thrush, oral mucosa moist     Neck:   No adenopathy, supple, trachea midline   Lungs:     Clear to auscultation, respirations regular, even and not labored      Heart:    Regular rhythm and normal rate   Abdomen:     Occasional bowel sounds, no masses, no organomegaly, soft and non-tender, non-distended     Extremities:   No edema, no cyanosis, right foot bandaged     Pulses:   Radial pulses palpable and equal bilaterally          Results Review:     I reviewed the  patient's new clinical results.    Medication Reviewed.          acetaminophen **OR** acetaminophen **OR** acetaminophen    senna-docusate sodium **AND** polyethylene glycol **AND** bisacodyl **AND** bisacodyl    diphenoxylate-atropine    HYDROmorphone    Morphine **OR** morphine **OR** HYDROmorphone    Morphine **OR** morphine **OR** HYDROmorphone    Morphine **OR** morphine **OR** HYDROmorphone    LORazepam **OR** LORazepam **OR** LORazepam    LORazepam **OR** LORazepam **OR** LORazepam    LORazepam **OR** LORazepam **OR** LORazepam    ondansetron ODT **OR** ondansetron    Polyvinyl Alcohol-Povidone PF      Assessment & Plan       Senile degeneration of brain    Hypertension    Chronic kidney disease, stage II (mild)    Hypernatremia    Severe protein-calorie malnutrition    LEVON (acute kidney injury)    Dementia with behavioral disturbance    UTI (urinary tract infection) due to urinary indwelling catheter    Perforation of rectum  Mr. Jefferson is a 84 y.o.  with a history of advanced Alzheimer's dementia, chronic disease, chronic Kim, hypertension who presents with altered mental status. He had a prolonged and complicated hospital course but ultimately continued to clinically declined. His family met with palliative care and hospice and he has transitioned to full comfort measures and has qualified for a hospice scatter bed.    -Continue medications for pain and restlessness.  -His current palliative performance scale of around 30%.  However we did you take into account he currently has a limb that is dead and likely will continue to have issues his overall prognosis is extremely poor.  Likely days to weeks  --> 2 doses of IV morphine in the last 24 hours for a total of 8.0 mg  --> 0 doses of Ativan in the last 24 hours for total dose of 0.0 mg  --> 0 doses of Robinul in the last 24 hours for total dose of 0.0 mg    Plan for disposition: HEATHER Boyce MD  11/24/24  14:40 EST

## 2024-11-24 NOTE — PLAN OF CARE
Goal Outcome Evaluation:  Plan of Care Reviewed With: patient        Progress: declining  Outcome Evaluation: PPS 20%. Responds to voice/touch but is nonverbal. Medicated x1 with morphine for symptoms of pain (furrowed brow, muscle tension, restless) with improvement noted in symptoms. Pt has since appeared comfortable and resting between care. Will continue to monitor for comfort.

## 2024-11-25 LAB
BH BB BLOOD EXPIRATION DATE: NORMAL
BH BB BLOOD EXPIRATION DATE: NORMAL
BH BB BLOOD TYPE BARCODE: 7300
BH BB BLOOD TYPE BARCODE: 7300
BH BB DISPENSE STATUS: NORMAL
BH BB DISPENSE STATUS: NORMAL
BH BB PRODUCT CODE: NORMAL
BH BB PRODUCT CODE: NORMAL
BH BB UNIT NUMBER: NORMAL
BH BB UNIT NUMBER: NORMAL
CROSSMATCH INTERPRETATION: NORMAL
CROSSMATCH INTERPRETATION: NORMAL
UNIT  ABO: NORMAL
UNIT  ABO: NORMAL
UNIT  RH: NORMAL
UNIT  RH: NORMAL

## 2024-11-25 NOTE — PROGRESS NOTES
Palliative Care/Hospice Follow Up Note       LOS: 3 days   Patient Care Team:  Koby Holbrook MD as PCP - General (Internal Medicine)  Sylvester Sparks MD as Consulting Physician (Urology)    Chief Complaint: Restlessness confusion poor intake    Interval History:     He is awake this morning and nonverbal.  No issues per nursing staff.  Medicated periodically for pain but resting comfortably between care.  Eating at times  Palliative Performance Scale  Palliative Performance Scale Score: 30%  [place] Symptom Assessment System Revised  Pain Score: no pain   ESAS Tiredness Score: 6  ESAS Nausea Score: No nausea  ESAS Depression Score: No depression  ESAS Anxiety Score: No anxiety  ESAS Drowsiness Score: 7  ESAS Lack of Appetite Score: 5  ESAS Wellbeing Score: 2  ESAS Dyspnea Score: No shortness of breath  ESAS Other Problem Score: Best possible response  ESAS Source of Information: healthcare professional caregiver  ESAS Intervention: other (see comment) (no intervention needed)  ESAS Intervention Response: tolerated      Review of Systems: Review of systems could not be obtained due to patient confusion.       Vital Signs  Temp:  [98.8 °F (37.1 °C)-100.3 °F (37.9 °C)] 100.3 °F (37.9 °C)  Heart Rate:  [127-128] 127  Resp:  [16] 16  BP: ()/(63-69) 95/63  Device (Oxygen Therapy): room air SpO2:  [90 %-98 %] 90 %    Physical Exam:  General Appearance:    awake and in no acute distress, chronic ill-appearing     Throat:   No oral lesions, no thrush, oral mucosa moist     Neck:   No adenopathy, supple, trachea midline   Lungs:     Clear to auscultation, respirations regular, even and not labored      Heart:    Regular rhythm and normal rate   Abdomen:     Occasional bowel sounds, no masses, no organomegaly, soft and non-tender, non-distended     Extremities:   No edema, no cyanosis, right foot bandaged     Pulses:   Radial pulses palpable and equal bilaterally          Results Review:     I  reviewed the patient's new clinical results.    Medication Reviewed.          acetaminophen **OR** acetaminophen **OR** acetaminophen    senna-docusate sodium **AND** polyethylene glycol **AND** bisacodyl **AND** bisacodyl    diphenoxylate-atropine    HYDROmorphone    Morphine **OR** morphine **OR** HYDROmorphone    Morphine **OR** morphine **OR** HYDROmorphone    Morphine **OR** morphine **OR** HYDROmorphone    LORazepam **OR** LORazepam **OR** LORazepam    LORazepam **OR** LORazepam **OR** LORazepam    LORazepam **OR** LORazepam **OR** LORazepam    ondansetron ODT **OR** ondansetron    Polyvinyl Alcohol-Povidone PF      Assessment & Plan       Senile degeneration of brain    Hypertension    Chronic kidney disease, stage II (mild)    Hypernatremia    Severe protein-calorie malnutrition    LEVON (acute kidney injury)    Dementia with behavioral disturbance    UTI (urinary tract infection) due to urinary indwelling catheter    Perforation of rectum  Mr. Jefferson is a 84 y.o.  with a history of advanced Alzheimer's dementia, chronic disease, chronic Kim, hypertension who presents with altered mental status. He had a prolonged and complicated hospital course but ultimately continued to clinically declined. His family met with palliative care and hospice and he has transitioned to full comfort measures and has qualified for a hospice scatter bed.    -Continue medications for pain and restlessness.  -His current palliative performance scale of around 30%.  However we did you take into account he currently has a limb that is dead and likely will continue to have issues his overall prognosis is extremely poor.  Likely days to weeks  --> 2 doses of IV morphine in the last 24 hours for a total of 8.0 mg  --> 0 doses of Ativan in the last 24 hours for total dose of 0.0 mg  --> 0 doses of Robinul in the last 24 hours for total dose of 0.0 mg    Plan for disposition: HEATHER Boyce MD  11/25/24  13:24 EST

## 2024-11-25 NOTE — PROGRESS NOTES
SB team SW met with patient to explain role of SB team SW and assess for needs. Patient was lying in his hospital bed, unresponsive with no signs of pain or discomfort. No family members were at the bedside. Patient is currently GIP at [facility]. SW sat with patient to provide supportive presence.   Patient is currently a 10% PPS and is unresponsive, therefore SW unable to complete PHQ9. SW called wife Alka to offer support. Patient has three children that live in the area and both are involved in patient's care. Patient was living at home with his wife prior to this hospital stay. Family would like for patient to remain in a SB for EOL care due to his imminent prognosis. SW educated on bereavement services provided by [facility] and family voiced understanding . SW will visit on a weekly and PRN basis to offer EOL support and assist with needs.

## 2024-11-25 NOTE — PLAN OF CARE
Goal Outcome Evaluation:  Plan of Care Reviewed With: patient        Progress: no change  Outcome Evaluation: PPS 30%. Pt responsive to voice/touch, nonverbal. Medicated x1 with morphine for signs of pain (furrowed brow, muscle tension). Dressings to RLE and coccyx changed. Wound to coccyx is malodorous. Pt otherwise rested between care and appeared comfortable. Will monitor for comfort.

## 2024-11-25 NOTE — PLAN OF CARE
Problem: Adult Inpatient Plan of Care  Goal: Plan of Care Review  Outcome: Met  Flowsheets (Taken 11/25/2024 0618 by Alana Matta, RN)  Outcome Evaluation: PPS 30%. Pt responsive to voice/touch, nonverbal. Medicated x1 with morphine for signs of pain (furrowed brow, muscle tension). Dressings to RLE and coccyx changed. Wound to coccyx is malodorous. Pt otherwise rested between care and appeared comfortable. Will monitor for comfort.  Goal: Patient-Specific Goal (Individualized)  Outcome: Met  Goal: Absence of Hospital-Acquired Illness or Injury  Outcome: Met  Goal: Optimal Comfort and Wellbeing  Outcome: Met  Goal: Readiness for Transition of Care  Outcome: Met   Goal Outcome Evaluation:

## 2024-11-25 NOTE — PROGRESS NOTES
[facility] Visit Report    Sabas Jefferson  [id number]  11/25/2024    Admission R/T [facility] Dx: yes    Reason for [facility] Admission: senile degeneration of the brain    Review of Visit: Patient is a 84 y.o. male with a primary [facility] diagnosis of senile degeneration of the brain. Admitted to [facility] for GIP for symptom management of pain. Contact isolation for VRE.    PPS: 30%    VS:   Temp: 100.3 °F  Heart Rate: 127  Resp: 16  BP: 95/63  O2: 90% on room air    Medications in 24 hours:  -IV Morphine 4mg PRN x2    Recommendations:  Continue to monitor for signs of decline and provide comfort measures. Contact [facility] at [phone] with any questions or concerns and at TOD.     Assessment:  Patient is bed bound, eating 25-50% of meals today, responsive to voice/touch, nonverbal at baseline per staff, PPS 30%. Respirations are even and unlabored, lung sounds diminished and clear throughout. Abdomen is soft with active bowel sounds, PO intake varies, requires full feed assistance. Upper extremities warm to touch, radial pulses palpable. RLE cold, wrapped with gauze, unable to palpate pulses due to dressing. Per documentation in Epic, stage 2 pressure injuries to the left posterior greater trochanter and coccyx. Unable to assess during visit, covered with dressings and positioned on his left side. Kim catheter to bedside drainage with dark yellow urine, 950mL documented output in 24 hours. No signs of pain or restlessness noted throughout visit.     Collaboration:  Spoke with pts daughterJanay and son, Sabas Dash via phone with condition update and support provided. Training provided regarding assessment findings, disease progression, symptom management and expected length of stay. Family v/u of pts condition and all questions were answered. Collaborated with [facility] RNKamilla and Beata NEGRON about pts condition.    Disposition:  Per Dr. Lana Benjamin, patient  continues to meet GIP criteria, requiring administration of parenteral medications to achieve and maintain symptom management. Patient is at risk for sudden death, decline or rapid increase in symptom burden given necrotic limb. If patient were to stabilize he would either require LTC placement due to increased daily care needs or return home with family and [facility] support depending on level of care needs at that time. Patient was living at home with his wife prior to this hospitalization. Will continue [facility] RN visits to monitor for changes, assess needs and provide support.       Gladys Garcia RN  [facility]  Scattered Bed Team

## 2024-11-26 NOTE — PROGRESS NOTES
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 Admitted to [facility] for GIP for symptom management of pain, dyspnea, restlessness. Contact     PPS: 10%    VS:   Temp: 100.2 °F  Heart Rate: 116  Resp: 16  BP: 93/65  O2: 99% on room air    Medications in 24 hours:  -Tylenol 650mg tablet PRN x1  -IV Dilaudid 1mg PRN x4  -IV Ativan 1mg PRN x1    Recommendations:  Continue to monitor for signs of decline and provide comfort measures. Contact [facility] at [phone] with any questions or concerns and at TOD.     Assessment:  Patient is bed bound, oral care only, unresponsive, PPS 10%. Patient appears frail with bitemporal wasting and sunken cheeks. Respirations are shallow, tachypneic and unlabored with open mouth breathing. Lung sounds diminished throughout, on room air. Abdomen is soft with absent bowel sounds, no PO intake today. Hands and feet cold to touch, radial pulses palpable, unable to palpate pedal pulses. RLE cold, necrotic, wrapped with gauze, unable to assess due to dressing. Per documentation in Epic, stage 2 pressure injuries to the left posterior greater trochanter and coccyx. Kim catheter to bedside drainage with dark yellow urine, 900mL documented output in 24 hours.     Collaboration:  Spoke with pts daughter, Janay briefly via phone. Janay reports that she is on her way to the hospital and would rather speak when she is at the bedside as she is currently driving. [facility] RN will follow up with Janay when she arrives. Collaborated with [facility] RNShelby and Beata NEGRON about pts condition.    Disposition:  Patient meets GIP criteria, requiring frequent administration and continued titration of parenteral medications to  achieve and maintain symptom management. Patient appears to be unsafe for transport at this time as evidenced by his rapid decline from PPS 30% to PPS 10%, alterations in vital signs, increased symptom management needs and frequent RN assessment. If patient were to stabilize he would either require LTC placement due to increased daily care needs or return home with family and [facility] support depending on his level of care needs at that time. Patient was living at home with his wife prior to this hospitalization. Will continue [facility] RN visits to monitor for changes, assess needs and provide support.     Addendum: Spoke with pts spouse, Alka and daughter, Janay, at the bedside with condition update and support provided. Training provided regarding assessment findings, disease progression, symptom management and medication regimen. Joint visit with [facility] RN, Shelby. Discussed pts decline in the last 24 hours, disease progression focused on oral intake and changes in level of consciousness, medication indications, actions and side effects. Family v/u of pts condition, request that patient be positioned with HOB elevated to prepare for dinner. Discussed pts change in level of consciousness, safety concerns with risk for aspiration, family v/u. [facility] staff will continue to provide support and training reinforcement.      Gladys Garcia, RN  [facility] Visit Nurse  Scattered Bed Team

## 2024-11-26 NOTE — PLAN OF CARE
Goal Outcome Evaluation:  Plan of Care Reviewed With: patient        Progress: declining  Outcome Evaluation: PPS 20%. Dilaudid 1mg IV x1. F/C. Dsng intact. Otherwise appears comfortable. No family at bedside.

## 2024-11-26 NOTE — PLAN OF CARE
Goal Outcome Evaluation:           Progress: declining  Outcome Evaluation: PPS 20%. Pt much more lethargic today. Opened eyes spontaneously a few times throughout the day, but was mostly unresponsive. Pt appeared restless a few times and was grimacing. Dilaudid and ativan given per MAR. Pt family at bedside in the evening. RN and Hospice RN both explained that pt is showing signs of decline and that he may be less responsive and not wanting to eat. Pt family needs reinforcement of education.

## 2024-11-27 NOTE — PROGRESS NOTES
"    Name: Sabas Jefferson ADMIT: 2024   : 1940  PCP: Koby Holbrook MD    MRN: [id number] LOS: 5 days   AGE/SEX: 84 y.o. male  ROOM: [facility]     Subjective   Subjective   Resting comfortably in bed.  Appears comfortable.       Objective   Objective   Vital Signs  Temp:  [98.8 °F (37.1 °C)-100.2 °F (37.9 °C)] 98.8 °F (37.1 °C)  Heart Rate:  [176] 176  Resp:  [16-20] 16  SpO2:  [95 %] 95 %  on   ;   Device (Oxygen Therapy): room air  There is no height or weight on file to calculate BMI.  Physical Exam  Constitutional:       Appearance: He is ill-appearing.      Comments: Lethargic   Pulmonary:      Effort: No respiratory distress.   Abdominal:      General: Abdomen is flat.   Skin:     Comments: Right foot bandage   Neurological:      Mental Status: He is disoriented.         Results Review     I reviewed the patient's new clinical results.  Results from last 7 days   Lab Units 24  0545 24  0008 24  1606   WBC 10*3/mm3 20.69*  --   --    HEMOGLOBIN g/dL 6.8* 7.1* 7.6*   PLATELETS 10*3/mm3 320  --   --      Results from last 7 days   Lab Units 24  0545 24  1606   SODIUM mmol/L 144 141   POTASSIUM mmol/L 3.6 4.1   CHLORIDE mmol/L 115* 115*   CO2 mmol/L 15.1* 11.5*   BUN mg/dL 32* 37*   CREATININE mg/dL 1.58* 1.80*   GLUCOSE mg/dL 95 81   Estimated Creatinine Clearance: 30.7 mL/min (A) (by C-G formula based on SCr of 1.58 mg/dL (H)).  Results from last 7 days   Lab Units 24  0545 24  1606   ALBUMIN g/dL 1.7* 1.5*   BILIRUBIN mg/dL  --  0.4   ALK PHOS U/L  --  250*   AST (SGOT) U/L  --  23   ALT (SGPT) U/L  --  70*     Results from last 7 days   Lab Units 24  0545 24  1606   CALCIUM mg/dL 7.6* 8.0*   ALBUMIN g/dL 1.7* 1.5*   MAGNESIUM mg/dL 2.1  --    PHOSPHORUS mg/dL 4.4  --      Results from last 7 days   Lab Units 24  1804   LACTATE mmol/L 1.1   No results found for: \"COVID19\"  No results found for: \"HGBA1C\", \"POCGLU\"    US Renal " Bilateral  Narrative: RENAL ULTRASOUND     HISTORY: Acute kidney injury     COMPARISON: CT of the abdomen pelvis 11/17/2024     TECHNIQUE: Gray scale and color doppler ultrasound of the kidneys and  bladder was performed.        FINDINGS:  The right kidney measures 7.5 cm in length, and the left kidney measures  8.8 cm in length. Multiple cysts in the right kidney, largest located in  the upper pole measuring about 3.3 x 3.7 x 4.4 cm. No appreciable  hydronephrosis on the right. There is a 1.4 cm shadowing calculus noted  in the left kidney. There is some left-sided hydronephrosis present.  Kim catheter in the bladder     Impression: 1. No hydronephrosis is appreciated on the right. There is  hydronephrosis present on the left  2. There is a 1.4 cm shadowing calculus in the left kidney  3. Multiple right kidney cysts     This report was finalized on 11/20/2024 8:54 PM by Dr. King Peterson M.D on Workstation: [id number]       I reviewed the patient's daily medications.  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       84 y.o. male admitted with Senile degeneration of brain.    Mr. Jefferson is a 84 y.o.  with a history  of advanced Alzheimer's dementia, chronic disease, chronic Kim, hypertension who presents with altered mental status. He had a prolonged and complicated hospital course but ultimately continued to clinically declined. His family met with palliative care and hospice and he has transitioned to full comfort measures and has qualified for a hospice scatter bed.    -Continue medications for pain and restlessness.  Continue foot comfort focused care.  --> 4 doses of IV Dilaudid in the last 24 hours for a total of 4.0 mg  --> 2 doses of Ativan in the last 24 hours for total dose of 2.0 mg          Expected Discharge Date: 12/2/2024; Expected Discharge Time:       Maximiliano Murguia MD  [facility]  11/27/24  09:25 EST

## 2024-11-27 NOTE — PROGRESS NOTES
Palliative Care/Hospice Follow Up Note       LOS: 4 days   Patient Care Team:  Koby Holbrook MD as PCP - General (Internal Medicine)  Sylvester Sparks MD as Consulting Physician (Urology)    Chief Complaint: Restlessness confusion poor intake    Interval History:     He is awake this morning and nonverbal.  No issues per nursing staff.  Medicated periodically for pain but resting comfortably between care.  Eating at times  Palliative Performance Scale  Palliative Performance Scale Score: 30%  [place] Symptom Assessment System Revised  Pain Score: 5   ESAS Tiredness Score: 7  ESAS Nausea Score: No nausea  ESAS Depression Score: No depression  ESAS Anxiety Score: 4  ESAS Drowsiness Score: 7  ESAS Lack of Appetite Score: 8  ESAS Wellbeing Score: 5  ESAS Dyspnea Score: No shortness of breath  ESAS Other Problem Score: 2 (skin integrity)  ESAS Source of Information: healthcare professional caregiver, patient  ESAS Intervention: medicated/see MAR  ESAS Intervention Response: tolerated      Review of Systems: Review of systems could not be obtained due to patient confusion.       Vital Signs  Temp:  [100.2 °F (37.9 °C)] 100.2 °F (37.9 °C)  Heart Rate:  [116-176] 176  Resp:  [16-20] 20  BP: (93)/(65) 93/65  Device (Oxygen Therapy): room air SpO2:  [95 %-99 %] 95 %    Physical Exam:  General Appearance:    awake and in no acute distress, chronic ill-appearing more lethargic and appear uncomfortable this AM     Throat:   No oral lesions, no thrush, oral mucosa moist     Neck:   No adenopathy, supple, trachea midline   Lungs:     Clear to auscultation, respirations regular, even and not labored      Heart:    Regular rhythm and normal rate   Abdomen:     Occasional bowel sounds, no masses, no organomegaly, soft and non-tender, non-distended     Extremities:   No edema, no cyanosis, right foot bandaged but shriveled and dead beneath bandage     Pulses:   Radial pulses palpable and equal bilaterally           Results Review:     I reviewed the patient's new clinical results.    Medication Reviewed.          acetaminophen **OR** acetaminophen **OR** acetaminophen    senna-docusate sodium **AND** polyethylene glycol **AND** bisacodyl **AND** bisacodyl    diphenoxylate-atropine    HYDROmorphone    Morphine **OR** morphine **OR** HYDROmorphone    Morphine **OR** morphine **OR** HYDROmorphone    Morphine **OR** morphine **OR** HYDROmorphone    LORazepam **OR** LORazepam **OR** LORazepam    LORazepam **OR** LORazepam **OR** LORazepam    LORazepam **OR** LORazepam **OR** LORazepam    ondansetron ODT **OR** ondansetron    Polyvinyl Alcohol-Povidone PF      Assessment & Plan       Senile degeneration of brain    Hypertension    Chronic kidney disease, stage II (mild)    Hypernatremia    Severe protein-calorie malnutrition    LEVON (acute kidney injury)    Dementia with behavioral disturbance    UTI (urinary tract infection) due to urinary indwelling catheter    Perforation of rectum  Mr. Jefferson is a 84 y.o.  with a history of advanced Alzheimer's dementia, chronic disease, chronic Kim, hypertension who presents with altered mental status. He had a prolonged and complicated hospital course but ultimately continued to clinically declined. His family met with palliative care and hospice and he has transitioned to full comfort measures and has qualified for a hospice scatter bed.    -Continue medications for pain and restlessness.  -His current palliative performance scale of around 20-30%.  However we did you take into account he currently has a limb that is dead and likely will continue to have issues his overall prognosis is extremely poor.  Likely days to weeks  --> 2 doses of IV Dilaudid in the last 24 hours for a total of 2.0 mg  --> 0 doses of Ativan in the last 24 hours for total dose of 0.0 mg  --> 0 doses of Robinul in the last 24 hours for total dose of 0.0 mg    Plan for disposition: [facility]    Fareed Boyce,  MD  11/26/24  20:09 EST

## 2024-11-27 NOTE — PLAN OF CARE
Goal Outcome Evaluation:  Plan of Care Reviewed With: patient        Progress: declining  Outcome Evaluation: Sleeping between care. Appers comfortable at rest. Turns every 4 hours and prn. WIll continue palliative care.

## 2024-11-27 NOTE — PLAN OF CARE
Goal Outcome Evaluation:  Plan of Care Reviewed With: family        Progress: declining  Outcome Evaluation: PPS 10%. Pt more somnolent today, opening eyes some but has been asleep most of the day. Pt has some s/s of discomfort with turns. Family updated by phone, informed wife and dtr that Pt has not woken up much today. They asked if patient had eaten today. This nurse explained that patient cannot be fed if he is not awake, that it is not safe. Explained that patient is congested and was having difficulty breathing this morning. Despite education, family concerned that Pt has not had anything to eat today. Pt appears comfortable at this time.

## 2024-11-27 NOTE — PROGRESS NOTES
[facility] Visit Report    Sabas Jefferson  [id number]  11/27/2024    Admission R/T [facility] Dx: yes     Reason for [facility] Admission: senile degeneration of the brain     Review of Visit: Patient is an 84 y.o. 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 Admitted to [facility] for GIP for symptom management of pain, dyspnea, restlessness, congestion. Contact isolation for VRE of urine.      PPS: 10%     VS:   Temp: 98.8 °F  Resp: 30     Medications in 24 hours:  -IV Dilaudid 1mg PRN x3  -IV Ativan 1mg PRN x2     Recommendations:  Patient with shallow, tachypneic respirations, 30 bpm. [facility] RNMahamed, notified of assessment findings. Continue to monitor for signs of decline and provide comfort measures. Contact [facility] at [phone] with any questions or concerns and at TOD.      Assessment:  Patient is bed bound, oral care only, unresponsive, PPS 10%. Patient appears frail with bitemporal wasting and sunken cheeks. Respirations are shallow and tachypneic with open mouth breathing and mandibular movement. Lung sounds diminished throughout, on room air. Abdomen is soft with absent bowel sounds, no PO intake today. Hands and feet cold to touch, radial pulses palpable but weak, unable to palpate pedal pulses. RLE cold, necrotic, wrapped with gauze, unable to assess due to dressing. Per documentation in Epic, stage 2 pressure injuries to the left posterior greater trochanter and coccyx. Unable to assess due to pts position. Kim catheter to bedside drainage with dark yellow urine, 500mL documented output in 24 hours.      Collaboration:  Two calls placed to pts spouse, Alka, with no answer. Voicemail left with [facility] Customer Support phone number. Collaborated with [facility] Mahamed SUAREZ and Beata NEGRON about pts condition.     Disposition:  Patient meets GIP criteria, requiring frequent administration and continued titration of parenteral medications  to achieve and maintain symptom management. Patient appears to be unsafe for transport at this time as evidenced by his respiratory status, alterations in vital signs, increased symptom management needs and frequent RN assessment. If patient were to stabilize he would either require LTC placement due to increased daily care needs or return home with family and [facility] support depending on his level of care needs at that time. Patient was living at home with his wife prior to this hospitalization. Will continue [facility] RN visits to monitor for changes, assess needs and provide support.      Gladys Garcia RN  [facility] Visit Nurse  Scattered Bed Team

## 2024-11-28 NOTE — PLAN OF CARE
Goal Outcome Evaluation:              Outcome Evaluation: Pt  at 1120. No family at bedside. Family notified via telephone. [facility] chosen for  services.

## 2024-11-28 NOTE — PROGRESS NOTES
"    Name: Sabas Jefferson ADMIT: 2024   : 1940  PCP: Koby Holbrook MD    MRN: [id number] LOS: 6 days   AGE/SEX: 84 y.o. male  ROOM: [facility]     Subjective   Subjective   Resting comfortably in bed.  No family at bedside.     Objective   Objective   Vital Signs  Temp:  [97.1 °F (36.2 °C)-98 °F (36.7 °C)] 97.1 °F (36.2 °C)  Heart Rate:  [] 116  Resp:  [18] 18  BP: (90)/(60) 90/60  SpO2:  [96 %-97 %] 96 %  on   ;   Device (Oxygen Therapy): room air  There is no height or weight on file to calculate BMI.  Physical Exam  Constitutional:       Appearance: He is ill-appearing.      Comments: Lethargic   Pulmonary:      Effort: No respiratory distress.   Abdominal:      General: Abdomen is flat.   Skin:     Comments: Right foot bandage   Neurological:      Mental Status: He is disoriented.         Results Review     I reviewed the patient's new clinical results.            Estimated Creatinine Clearance: 30.7 mL/min (A) (by C-G formula based on SCr of 1.58 mg/dL (H)).                  No results found for: \"COVID19\"  No results found for: \"HGBA1C\", \"POCGLU\"    US Renal Bilateral  Narrative: RENAL ULTRASOUND     HISTORY: Acute kidney injury     COMPARISON: CT of the abdomen pelvis 2024     TECHNIQUE: Gray scale and color doppler ultrasound of the kidneys and  bladder was performed.        FINDINGS:  The right kidney measures 7.5 cm in length, and the left kidney measures  8.8 cm in length. Multiple cysts in the right kidney, largest located in  the upper pole measuring about 3.3 x 3.7 x 4.4 cm. No appreciable  hydronephrosis on the right. There is a 1.4 cm shadowing calculus noted  in the left kidney. There is some left-sided hydronephrosis present.  Kim catheter in the bladder     Impression: 1. No hydronephrosis is appreciated on the right. There is  hydronephrosis present on the left  2. There is a 1.4 cm shadowing calculus in the left kidney  3. Multiple right kidney cysts     This " report was finalized on 11/20/2024 8:54 PM by Dr. King Peterson M.D on Workstation: [id number]       I reviewed the patient's daily medications.  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       84 y.o. male admitted with Senile degeneration of brain.    Mr. Jefferson is a 84 y.o.  with a history of advanced Alzheimer's dementia, chronic disease, chronic Kim, hypertension who presents with altered mental status. He had a prolonged and complicated hospital course but ultimately continued to clinically declined. His family met with palliative care and hospice and he has transitioned to full comfort measures and has qualified for a hospice scatter bed.    -Continue medications for pain and restlessness.  Continue foot comfort focused care.  --> 3 doses of IV Dilaudid in the last 24 hours for a total of 3.0 mg  --> 0 doses of Ativan in the last 24 hours for total dose of 0.0 mg          Expected Discharge Date: 12/2/2024; Expected Discharge Time:       Maximiliano Murguia MD  [facility]  11/28/24  09:53 EST

## 2024-11-28 NOTE — PLAN OF CARE
Goal Outcome Evaluation:  Plan of Care Reviewed With: patient           Outcome Evaluation: PPS 10%. Pt is lethargic and unresponsive. PRN dilaudid 1mg given x1 for discomfort. Q4 turns.

## 2024-11-29 NOTE — PROGRESS NOTES
Case Management Discharge Note      Final Note: The patient  on 24 @ 11:20. BSulema Bethea RN, CCP         Selected Continued Care - Discharged on 2024 Admission date: 2024 - Discharge disposition:       Destination Coordination complete.      Service Provider Services Address Phone Fax Patient Preferred    [facility] [address], [facility] [id number] [phone] --              Durable Medical Equipment    No services have been selected for the patient.                Dialysis/Infusion    No services have been selected for the patient.                Home Medical Care    No services have been selected for the patient.                Therapy    No services have been selected for the patient.                Community Resources    No services have been selected for the patient.                Community & DME    No services have been selected for the patient.                    Selected Continued Care - Prior Encounters Includes continued care and service providers with selected services from prior encounters from 2024 to 2024      Discharged on 2024 Admission date: 2024 - Discharge disposition: Hospice/Medical Facility ([facility])      Destination       Service Provider Services Address Phone Fax Patient Preferred    [facility] [address], [place] [id number] [phone] --                               Final Discharge Disposition Code: 20 -

## 2024-12-02 NOTE — DISCHARGE SUMMARY
Name: Sabas Jefferson  :  1940  MRN: [id number]         Primary Care Physician: Koby Holbrook MD      Date of Admission:  2024  Date and Time of Death:  2024 at 11:20 AM    Principle Cause of Death:     Senile degeneration of brain    Secondary Diagnoses:       Hypertension    Chronic kidney disease, stage II (mild)    Hypernatremia    Severe protein-calorie malnutrition    LEVON (acute kidney injury)    Dementia with behavioral disturbance    UTI (urinary tract infection) due to urinary indwelling catheter    Perforation of rectum        Hospital Course  Patient was a 84 y.o. male who presented to [facility] with altered mental status, had a prolonged and complicated hospital course but ultimately he continued to clinically decline in his family met with palliative care and hospice and he was transition to hospice scatter bed.  He passed away peacefully.      Maximiliano Murguia MD  24  16:06 EST